# Patient Record
Sex: MALE | Race: WHITE | NOT HISPANIC OR LATINO | Employment: FULL TIME | URBAN - METROPOLITAN AREA
[De-identification: names, ages, dates, MRNs, and addresses within clinical notes are randomized per-mention and may not be internally consistent; named-entity substitution may affect disease eponyms.]

---

## 2018-07-19 ENCOUNTER — TRANSCRIBE ORDERS (OUTPATIENT)
Dept: PHYSICAL THERAPY | Facility: HOSPITAL | Age: 68
End: 2018-07-19

## 2018-07-19 DIAGNOSIS — M54.12 CERVICAL RADICULOPATHY: Primary | ICD-10-CM

## 2018-07-27 ENCOUNTER — HOSPITAL ENCOUNTER (OUTPATIENT)
Dept: PHYSICAL THERAPY | Facility: HOSPITAL | Age: 68
Discharge: HOME | End: 2018-07-27
Admitting: INTERNAL MEDICINE

## 2018-07-27 DIAGNOSIS — M54.2 CERVICALGIA: Primary | ICD-10-CM

## 2018-07-27 DIAGNOSIS — M54.12 CERVICAL RADICULOPATHY: ICD-10-CM

## 2018-07-27 PROCEDURE — 97110 THERAPEUTIC EXERCISES: CPT | Performed by: PHYSICAL THERAPIST

## 2018-07-27 PROCEDURE — G8985 CARRY GOAL STATUS: HCPCS | Performed by: PHYSICAL THERAPIST

## 2018-07-27 PROCEDURE — 97161 PT EVAL LOW COMPLEX 20 MIN: CPT | Performed by: PHYSICAL THERAPIST

## 2018-07-27 PROCEDURE — G8984 CARRY CURRENT STATUS: HCPCS | Performed by: PHYSICAL THERAPIST

## 2018-07-27 NOTE — THERAPY EVALUATION
Outpatient Physical Therapy Ortho Initial Evaluation  [facility]     Patient Name: Uriah Perez  : 1950  MRN: [id number]  Today's Date: 2018      Visit Date: 2018    There is no problem list on file for this patient.       No past medical history on file.     No past surgical history on file.    Visit Dx:     ICD-10-CM ICD-9-CM   1. Cervicalgia M54.2 723.1   2. Cervical radiculopathy M54.12 723.4             Patient History     Row Name 18 1100             History    Chief Complaint Difficulty with daily activities;Pain;Tinglings  -      Date Current Problem(s) Began 18  -      Brief Description of Current Complaint Pt has had some neck and R upper arm pain off and on for years. He came back from [place] and was moving boxes at the end of . He was having more constant arm pain. Went to [facility] and got some meds, that didn't help. Saw him again a couple of weeks ago. Given a steroid dose pack. It did help to relieve the pain.   -      Previous treatment for THIS PROBLEM Medication  -      Patient/Caregiver Goals Relieve pain;Return to prior level of function;Improve mobility;Improve strength  -      Patient's Rating of General Health Very good  -      Hand Dominance right-handed  -      Occupation/sports/leisure activities   -      How has patient tried to help current problem? heat, pred pack  -         Pain     Pain Location Arm;Neck  -LH      Pain at Present 4;5  -LH      Pain at Best 3;4  -LH      Pain at Worst 7  -      Pain Frequency Constant/continuous  -      What Performance Factors Make the Current Problem(s) WORSE? holding arm up to shave, sleeping, sometimes driving,   -      What Performance Factors Make the Current Problem(s) BETTER? Tylenol or advil, heating pain  -LH      Is your sleep disturbed? Yes  -      Difficulties at work? raising arms repetitvely  -         Fall Risk Assessment    Any falls in the past year: No  -LH          Services    Prior Rehab/Home Health Experiences Yes  -      When was the prior experience with Rehab/Home Health year ago  -         Daily Activities    Primary Language English  -      How does patient learn best? Reading  -      Teaching needs identified Home Exercise Program;Management of Condition  -      Patient is concerned about/has problems with Difficulty with self care (i.e. bathing, dressing, toileting:;Performing home management (household chores, shopping, care of dependents);Performing job responsibilities/community activities (work, school,;Reaching over head;Repetitive movements of the hand, arm, shoulder  -      Does patient have problems with the following? None  -      Barriers to learning None  -      Pt Participated in POC and Goals Yes  -         Safety    Are you being hurt, hit, or frightened by anyone at home or in your life? No  -LH      Are you being neglected by a caregiver No  -        User Key  (r) = Recorded By, (t) = Taken By, (c) = Cosigned By    Initials Name Provider Type     Felecia Lopez, PT Physical Therapist                PT Ortho     Row Name 07/27/18 1200       Posture/Observations    Forward Head Moderate  -LH    Thoracic Kyphosis Moderate;Increased  -LH    Rounded Shoulders Moderate  -LH       Myotomal Screen- Upper Quarter Clearing    Shoulder flexion (C5) 4+ (Good +)  -LH    Elbow flexion/wrist extension (C6) 4+ (Good +)  -LH    Elbow extension/wrist flexion (C7) 4+ (Good +)  -LH    Finger flexion/ (C8) 4+ (Good +)  -LH    Finger abduction (T1) 4+ (Good +)  -       Cervical Palpation    Suboccipital Right:;Tender;Guarded/taut  -    Levator Scapula Right:;Tender;Guarded/taut  -    Upper Traps Right:;Tender;Guarded/taut  -LH       Cervical Accessory Motions    Cervical Accessory Motions Tested? --   decreased mobility with R rot and lat flexion  -       Cervical/Thoracic Special Tests    Spurlings (Foraminal Compression) Right:;Positive   -    Cervical Compression (Forarminal Compression vs. Facet Pain) Right:;Positive  -    Cervical Distraction (Foraminal Compression vs. Facet Pain) Right:;Positive  -       Head/Neck/Trunk    Head/Neck/Trunk Comments limited about 25-50% with pain on R with R rotation and lateral flexion, mild with ext (but increased with overpressure)  -      User Key  (r) = Recorded By, (t) = Taken By, (c) = Cosigned By    Initials Name Provider Type     Felecia Lopez, PT Physical Therapist                      Therapy Education  Education Details: given info on dry needling, discussed cervical traction  Given: HEP, Symptoms/condition management, Pain management, Posture/body mechanics  Program: New  How Provided: Verbal, Written, Demonstration  Provided to: Patient  Level of Understanding: Teach back education performed, Verbalized, Demonstrated           PT OP Goals     Row Name 07/27/18 1200          PT Short Term Goals    STG 1 Patient will be independent with education for posture, symptom management, joint protection and strategies to minimize stress on affected tissues  -     STG 1 [facility]     STG 2 Pt to improve pain complaints to no more than 5/10 with ADLs  -     STG 2 [facility]        Long Term Goals    LTG 1 Pt to improve pain complaints to no more than 2-3/10 with ADLs  -     LTG 1 [facility]     LTG 2 Pt to reduce radicular pain to occasional reports  -     LTG 2 [facility]     LTG 3 Pt able to sleep for 5 hours without waking from pain  -     LTG 3 [facility]     LTG 4 Pt to improve NDI score from 28% to 12% for overall functional improvement  -     LTG 4 [facility]     LTG 5 Pt to be independent with HEP for symptom management and strengthening  -     LTG 5 [facility]  -LH        Time Calculation    PT Goal Re-Cert Due Date 10/27/18  -       User Key  (r) = Recorded By, (t) = Taken By, (c) = Cosigned By    Initials Name Provider Type      Felecia Lopez, PT Physical Therapist                PT Assessment/Plan     Row Name 07/27/18 2513          PT Assessment    Functional Limitations Limitations in community activities;Limitation in home management;Performance in leisure activities;Limitations in functional capacity and performance;Performance in self-care ADL  -     Impairments Muscle strength;Pain;Joint mobility;Poor body mechanics;Posture;Range of motion  -     Assessment Comments Pt is a 67 y.o male with complaints of neck pain with radiating pain into the R UE that began in June after moving boxes into his new residence. His pain is stable and has improved since he took a steroid dose pain. He has radiating pain into C6/7 dermatome of the RUE that is pretty constant. He has decreased cervical ROM and pain with R rotation and lateral flexion. He is (+) for R arm pain with cervical compression and Spurlings test. He scored a 28% on the NDI, with 0% being no disability. Pt would benefit from therapy to improve his posture and help improve function with ADLs such as shaving, sleeping, and lifting.   -     Please refer to paper survey for additional self-reported information Yes  -     Rehab Potential Good  -     Patient/caregiver participated in establishment of treatment plan and goals Yes  -     Patient would benefit from skilled therapy intervention Yes  -        PT Plan    PT Frequency 2x/week  -     Predicted Duration of Therapy Intervention (Therapy Eval) 4-6 wks  -     Planned CPT's? PT EVAL LOW COMPLEXITY: 92518;PT THER PROC EA 15 MIN: 13198;PT TRACTION CERVICAL: 59207;PT MANUAL THERAPY EA 15 MIN: 84876;PT ELECTRICAL STIM ATTD EA 15 MIN: 53591;PT HOT OR COLD PACK TREAT MCARE  -     Physical Therapy Interventions (Optional Details) joint mobilization;patient/family education;ROM (Range of Motion);modalities;manual therapy techniques;strengthening;stretching;dry needling;home exercise program;postural re-education  -      PT Plan Comments plan on see 2x week for improving posture and reduction of pain  -       User Key  (r) = Recorded By, (t) = Taken By, (c) = Cosigned By    Initials Name Provider Type     Felecia Lpoez PT Physical Therapist                  Exercises     Row Name 07/27/18 1200             Total Minutes    03732 - PT Therapeutic Exercise Minutes 15  -LH         Exercise 1    Exercise Name 1 supine cervical nodding  -LH      Sets 1 1  -LH      Reps 1 10  -LH      Time 1 5 sec  -LH         Exercise 2    Exercise Name 2 shoulder rolls backward  -LH      Sets 2 1  -LH      Reps 2 10  -LH         Exercise 3    Exercise Name 3 scap ret and shoulder ext  -LH      Sets 3 1  -LH      Reps 3 20  -LH      Additional Comments red  -LH         Exercise 4    Exercise Name 4 pect stretch in doorway  -LH      Sets 4 1  -LH      Reps 4 3  -LH      Time 4 20 sec  -LH        User Key  (r) = Recorded By, (t) = Taken By, (c) = Cosigned By    Initials Name Provider Type     Felecia Lopez PT Physical Therapist                        Outcome Measure Options: Neck Disability Index (NDI) (28%)         Time Calculation:     Therapy Suggested Charges     Code   Minutes Charges    60683 (CPT®)  Pt Neuromusc Re Education Ea 15 Min      66876 (CPT®)  Pt Ther Proc Ea 15 Min 15 1    09048 (CPT®) Hc Gait Training Ea 15 Min      40230 (CPT®) Hc Pt Therapeutic Act Ea 15 Min      67288 (CPT®) Hc Pt Manual Therapy Ea 15 Min      72400 (CPT®)  Pt Ther Massage- Per 15 Min      98033 (CPT®)  Pt Iontophoresis Ea 15 Min      38550 (CPT®)  Pt Elec Stim Ea-Per 15 Min      44397 (CPT®)  Pt Ultrasound Ea 15 Min      30625 (CPT®)  Pt Self Care/Mgmt/Train Ea 15 Min      Total  15 1          Start Time: 1100  Stop Time: 1145  Time Calculation (min): 45 min  Total Timed Code Minutes- PT: 43 minute(s)     Therapy Charges for Today     Code Description Service Date Service Provider Modifiers Qty    06753548451 HC PT CARRY MOV HAND OBJ  CURRENT 7/27/2018 Felecia Lopez, PT GP, CJ 1    94274823717 HC PT CARRY MOV HAND OBJ PROJECTED 7/27/2018 Felecia Lopez, PT GP, CI 1    78205835434 HC PT THER PROC EA 15 MIN 7/27/2018 Felecia Lopez, PT GP 1    28360032825 HC PT EVAL LOW COMPLEXITY 2 7/27/2018 Felecia Lopez, PT GP 1          PT G-Codes  Outcome Measure Options: Neck Disability Index (NDI) (28%)  Functional Limitation: Carrying, moving and handling objects  Carrying, Moving and Handling Objects Current Status (): At least 20 percent but less than 40 percent impaired, limited or restricted  Carrying, Moving and Handling Objects Goal Status (): At least 1 percent but less than 20 percent impaired, limited or restricted         Felecia Lopez, PT  7/27/2018

## 2018-08-02 ENCOUNTER — HOSPITAL ENCOUNTER (OUTPATIENT)
Dept: PHYSICAL THERAPY | Facility: HOSPITAL | Age: 68
Setting detail: THERAPIES SERIES
Discharge: HOME | End: 2018-08-02

## 2018-08-02 DIAGNOSIS — M54.12 CERVICAL RADICULOPATHY: ICD-10-CM

## 2018-08-02 DIAGNOSIS — M54.2 CERVICALGIA: Primary | ICD-10-CM

## 2018-08-02 PROCEDURE — 97110 THERAPEUTIC EXERCISES: CPT | Performed by: PHYSICAL THERAPIST

## 2018-08-02 PROCEDURE — 97140 MANUAL THERAPY 1/> REGIONS: CPT | Performed by: PHYSICAL THERAPIST

## 2018-08-02 NOTE — THERAPY TREATMENT NOTE
Outpatient Physical Therapy Ortho Treatment Note  [facility]     Patient Name: Uriah Perez  : 1950  MRN: [id number]  Today's Date: 2018      Visit Date: 2018    Visit Dx:    ICD-10-CM ICD-9-CM   1. Cervicalgia M54.2 723.1   2. Cervical radiculopathy M54.12 723.4       There is no problem list on file for this patient.       No past medical history on file.     No past surgical history on file.                          PT Assessment/Plan     Row Name 18 1017          PT Assessment    Assessment Comments Pt is still having tingling into the arm,but reports that his overall pain is better. he is working on improving his posture with work. He has trigger points in the R UT and levator muscles and consented to IMT after discussion of benefits and risks. Pt tolerated it well and had decreased pain post tx  -LH        PT Plan    PT Plan Comments assess pain/.symptoms following IMT  -       User Key  (r) = Recorded By, (t) = Taken By, (c) = Cosigned By    Initials Name Provider Type     Felecia Lopez, PT Physical Therapist                    Exercises     Row Name 18 0900             Subjective Comments    Subjective Comments Have some moments where the tingling  and pain is lower.   -         Subjective Pain    Able to rate subjective pain? yes  -      Pre-Treatment Pain Level 5  -LH         Total Minutes    18732 - PT Therapeutic Exercise Minutes 15  -LH      25105 - PT Manual Therapy Minutes 25  -LH         Exercise 1    Exercise Name 1 supine cervical nodding  -      Sets 1 1  -      Reps 1 10  -LH      Time 1 5 sec  -LH         Exercise 2    Exercise Name 2 shoulder rolls backward  -      Sets 2 1  -      Reps 2 10  -LH         Exercise 3    Exercise Name 3 scap ret and shoulder ext  -      Sets 3 1  -      Reps 3 20  -LH      Additional Comments red  -         Exercise 4    Exercise Name 4 pect stretch in doorway  -      Sets 4 1  -      Reps 4 3   -LH      Time 4 20 sec  -LH         Exercise 5    Exercise Name 5 supine horiz abd and D2 flex  -LH      Sets 5 1  -LH      Reps 5 20  -LH      Additional Comments red  -LH        User Key  (r) = Recorded By, (t) = Taken By, (c) = Cosigned By    Initials Name Provider Type    Felecia Garza PT Physical Therapist                        Manual Rx (last 36 hours)      Manual Treatments     Row Name 08/02/18 0900             Total Minutes    70275 - PT Manual Therapy Minutes 25  -LH         Manual Rx 1    Manual Rx 1 Location B UT, R levator  -LH      Manual Rx 1 Type intramuscular manual therapy using threading techniques. LTRs were achieved. Palpation was done before, during, and after tx. Obtained written and verbal consent to treat  -      Manual Rx 1 Duration 8  -LH         Manual Rx 2    Manual Rx 2 Location B UT, levator and cervical PVMs  -      Manual Rx 2 Type STM to above with manual traction and stretching to pects and R UT/levator  -LH      Manual Rx 2 Duration 17  -LH        User Key  (r) = Recorded By, (t) = Taken By, (c) = Cosigned By    Initials Name Provider Type     Felecia Lopez PT Physical Therapist              Therapy Education  Given: HEP, Symptoms/condition management, Pain management, Posture/body mechanics  Program: New  How Provided: Verbal, Written  Provided to: Patient  Level of Understanding: Teach back education performed              Time Calculation:   Start Time: 0930  Stop Time: 1013  Time Calculation (min): 43 min  Total Timed Code Minutes- PT: 40 minute(s)  Therapy Suggested Charges     Code   Minutes Charges    88325 (CPT®) Hc Pt Neuromusc Re Education Ea 15 Min      54320 (CPT®) Hc Pt Ther Proc Ea 15 Min 15 1    65071 (CPT®) Hc Gait Training Ea 15 Min      94240 (CPT®) Hc Pt Therapeutic Act Ea 15 Min      46859 (CPT®) Hc Pt Manual Therapy Ea 15 Min 25 2    63069 (CPT®) Hc Pt Ther Massage- Per 15 Min      34670 (CPT®) Hc Pt Iontophoresis Ea 15 Min      96812  (CPT®) Hc Pt Elec Stim Ea-Per 15 Min      08182 (CPT®) Hc Pt Ultrasound Ea 15 Min      17639 (CPT®) Hc Pt Self Care/Mgmt/Train Ea 15 Min      Total  40 3        Therapy Charges for Today     Code Description Service Date Service Provider Modifiers Qty    31341317078 HC PT THER PROC EA 15 MIN 8/2/2018 Felecia Lopez, PT GP 1    45270417229 HC PT MANUAL THERAPY EA 15 MIN 8/2/2018 Felecia Lopez, PT GP 2                    Felecia Lopez, PT  8/2/2018

## 2018-08-06 ENCOUNTER — HOSPITAL ENCOUNTER (OUTPATIENT)
Dept: PHYSICAL THERAPY | Facility: HOSPITAL | Age: 68
Setting detail: THERAPIES SERIES
Discharge: HOME | End: 2018-08-06

## 2018-08-06 DIAGNOSIS — M54.2 CERVICALGIA: Primary | ICD-10-CM

## 2018-08-06 DIAGNOSIS — M54.12 CERVICAL RADICULOPATHY: ICD-10-CM

## 2018-08-06 PROCEDURE — 97140 MANUAL THERAPY 1/> REGIONS: CPT | Performed by: PHYSICAL THERAPIST

## 2018-08-06 PROCEDURE — 97110 THERAPEUTIC EXERCISES: CPT | Performed by: PHYSICAL THERAPIST

## 2018-08-06 NOTE — THERAPY TREATMENT NOTE
Outpatient Physical Therapy Ortho Treatment Note  [facility]     Patient Name: Uriah Perez  : 1950  MRN: [id number]  Today's Date: 2018      Visit Date: 2018    Visit Dx:    ICD-10-CM ICD-9-CM   1. Cervicalgia M54.2 723.1   2. Cervical radiculopathy M54.12 723.4       There is no problem list on file for this patient.       No past medical history on file.     No past surgical history on file.                          PT Assessment/Plan     Row Name 18 1338          PT Assessment    Assessment Comments Pt pain levels are more in the am. He is still having tingling in the arm down to the hand that is constant. computer work or reading tend to increase the pain  -        PT Plan    PT Plan Comments cont  -       User Key  (r) = Recorded By, (t) = Taken By, (c) = Cosigned By    Initials Name Provider Type     Felecia Lopez, PT Physical Therapist                    Exercises     Row Name 18 1300 18 1200          Subjective Comments    Subjective Comments  -- The tingling is constant. Pain is worse in the am  -        Subjective Pain    Able to rate subjective pain?  -- yes  -     Pre-Treatment Pain Level  -- 5  -LH        Total Minutes    97639 - PT Therapeutic Exercise Minutes  -- 15  -LH     41400 - PT Manual Therapy Minutes 25  -LH  --        Exercise 1    Exercise Name 1  -- supine cervical nodding  -     Sets 1  -- 1  -LH     Reps 1  -- 10  -LH     Time 1  -- 5 sec  -LH        Exercise 2    Exercise Name 2  -- shoulder rolls backward  -     Sets 2  -- 1  -LH     Reps 2  -- 10  -LH        Exercise 3    Exercise Name 3  -- scap ret and shoulder ext  -     Sets 3  -- 1  -LH     Reps 3  -- 20  -LH     Additional Comments  -- green  -        Exercise 4    Exercise Name 4  -- pect stretch in doorway  -     Sets 4  -- 1  -LH     Reps 4  -- 3  -LH     Time 4  -- 20 sec  -LH        Exercise 5    Exercise Name 5  -- supine horiz abd and D2 flex  -LH      Sets 5  -- 1  -     Reps 5  -- 20  -     Additional Comments  -- green  -       User Key  (r) = Recorded By, (t) = Taken By, (c) = Cosigned By    Initials Name Provider Type     Felecia Lopez PT Physical Therapist                        Manual Rx (last 36 hours)      Manual Treatments     Row Name 08/06/18 1300             Total Minutes    85246 - PT Manual Therapy Minutes 25  -         Manual Rx 2    Manual Rx 2 Location B UT, levator and cervical PVMs  -      Manual Rx 2 Type STM to above with manual traction and stretching to pects and R UT/levator  -      Manual Rx 2 Duration 25  -        User Key  (r) = Recorded By, (t) = Taken By, (c) = Cosigned By    Initials Name Provider Type     Felecia Lopez PT Physical Therapist              Therapy Education  Given: HEP, Symptoms/condition management, Pain management, Posture/body mechanics  Program: Progressed  How Provided: Verbal  Provided to: Patient  Level of Understanding: Teach back education performed              Time Calculation:   Start Time: 1245  Stop Time: 1330  Time Calculation (min): 45 min  Total Timed Code Minutes- PT: 40 minute(s)  Therapy Suggested Charges     Code   Minutes Charges    00876 (CPT®) Hc Pt Neuromusc Re Education Ea 15 Min      06544 (CPT®) Hc Pt Ther Proc Ea 15 Min 15 1    17326 (CPT®) Hc Gait Training Ea 15 Min      18501 (CPT®) Hc Pt Therapeutic Act Ea 15 Min      78725 (CPT®) Hc Pt Manual Therapy Ea 15 Min 25 2    00887 (CPT®) Hc Pt Ther Massage- Per 15 Min      21605 (CPT®) Hc Pt Iontophoresis Ea 15 Min      70055 (CPT®) Hc Pt Elec Stim Ea-Per 15 Min      26122 (CPT®) Hc Pt Ultrasound Ea 15 Min      58460 (CPT®) Hc Pt Self Care/Mgmt/Train Ea 15 Min      Total  40 3        Therapy Charges for Today     Code Description Service Date Service Provider Modifiers Qty    52445669427 HC PT THER PROC EA 15 MIN 8/6/2018 Felecia Lopez, PT GP 1    14924443020 HC PT MANUAL THERAPY EA 15 MIN 8/6/2018 John  Felecia EUBANKS, PT GP 2                    Felecia Lopez, PT  8/6/2018

## 2018-08-10 ENCOUNTER — HOSPITAL ENCOUNTER (OUTPATIENT)
Dept: PHYSICAL THERAPY | Facility: HOSPITAL | Age: 68
Setting detail: THERAPIES SERIES
Discharge: HOME | End: 2018-08-10

## 2018-08-10 DIAGNOSIS — M54.12 CERVICAL RADICULOPATHY: ICD-10-CM

## 2018-08-10 DIAGNOSIS — M54.2 CERVICALGIA: Primary | ICD-10-CM

## 2018-08-10 PROCEDURE — 97140 MANUAL THERAPY 1/> REGIONS: CPT | Performed by: PHYSICAL THERAPIST

## 2018-08-10 PROCEDURE — 97110 THERAPEUTIC EXERCISES: CPT | Performed by: PHYSICAL THERAPIST

## 2018-08-10 NOTE — THERAPY TREATMENT NOTE
Outpatient Physical Therapy Ortho Treatment Note  [facility]     Patient Name: Uriah Perez  : 1950  MRN: [id number]  Today's Date: 8/10/2018      Visit Date: 08/10/2018    Visit Dx:    ICD-10-CM ICD-9-CM   1. Cervicalgia M54.2 723.1   2. Cervical radiculopathy M54.12 723.4       There is no problem list on file for this patient.       No past medical history on file.     No past surgical history on file.                          PT Assessment/Plan     Row Name 08/10/18 1021          PT Assessment    Assessment Comments pt pain levels have been a little improved this week. He still has tingling down to the R hand that is pretty constant, but reduced with manual distraction  -        PT Plan    PT Plan Comments cont to work on posture and pain reduction  -       User Key  (r) = Recorded By, (t) = Taken By, (c) = Cosigned By    Initials Name Provider Type     Felecia Lpoez, PT Physical Therapist                    Exercises     Row Name 08/10/18 0900             Subjective Comments    Subjective Comments Went to [place] 2 days ago, didn't drive, but felt great for most of the day.  -         Subjective Pain    Able to rate subjective pain? yes  -      Pre-Treatment Pain Level 3  -      Post-Treatment Pain Level 2  -         Total Minutes    53240 - PT Therapeutic Exercise Minutes 15  -LH      09161 - PT Manual Therapy Minutes 25  -LH         Exercise 1    Exercise Name 1 supine cervical nodding  -      Sets 1 1  -      Reps 1 10  -LH      Time 1 5 sec  -LH         Exercise 2    Exercise Name 2 shoulder rolls backward  -      Sets 2 1  -      Reps 2 10  -LH         Exercise 3    Exercise Name 3 scap ret and shoulder ext  -      Sets 3 1  -      Reps 3 20  -LH      Additional Comments green  -         Exercise 4    Exercise Name 4 pect stretch in doorway  -      Sets 4 1  -      Reps 4 3  -LH      Time 4 20 sec  -LH         Exercise 5    Exercise Name 5 supine horiz  abd and D2 flex  -      Sets 5 1  -LH      Reps 5 20  -LH      Additional Comments green  -        User Key  (r) = Recorded By, (t) = Taken By, (c) = Cosigned By    Initials Name Provider Type     Felecia Lopez, PT Physical Therapist                        Manual Rx (last 36 hours)      Manual Treatments     Row Name 08/10/18 0900             Total Minutes    21266 - PT Manual Therapy Minutes 25  -LH         Manual Rx 1    Manual Rx 1 Location B UT, R levator  -      Manual Rx 1 Type intramuscular manual therapy using threading techniques. LTRs were achieved. Palpation was done before, during, and after tx. Obtained verbal consent to treat  -      Manual Rx 1 Duration 8  -LH         Manual Rx 2    Manual Rx 2 Location B UT, levator and cervical PVMs  -      Manual Rx 2 Type STM to above with manual traction and stretching to pects and R UT/levator  -      Manual Rx 2 Duration 17  -LH        User Key  (r) = Recorded By, (t) = Taken By, (c) = Cosigned By    Initials Name Provider Type     Felecia Lopez PT Physical Therapist                PT OP Goals     Row Name 08/10/18 1000          PT Short Term Goals    STG 1 Patient will be independent with education for posture, symptom management, joint protection and strategies to minimize stress on affected tissues  -     STG 1 [facility]ing  -     STG 2 Pt to improve pain complaints to no more than 5/10 with ADLs  -     STG 2 Progress Partially Met  -        Long Term Goals    LTG 1 Pt to improve pain complaints to no more than 2-3/10 with ADLs  -     LTG 1 [facility] Ongoing  -     LTG 2 Pt to reduce radicular pain to occasional reports  -     LTG 2 [facility] Ongoing  -     LTG 3 Pt able to sleep for 5 hours without waking from pain  -     LTG 3 [facility] Ongoing;Progressing  -     LTG 4 Pt to improve NDI score from 28% to 12% for overall functional improvement  -     LTG 4 [facility] Ongoing  -     LTG 5 Pt to be independent  with HEP for symptom management and strengthening  -     LTG 5 Progress Ongoing  -       User Key  (r) = Recorded By, (t) = Taken By, (c) = Cosigned By    Initials Name Provider Type     Felecia Lopez, PT Physical Therapist          Therapy Education  Education Details: discussed his MRI              Time Calculation:   Start Time: 0930  Stop Time: 1015  Time Calculation (min): 45 min  Total Timed Code Minutes- PT: 40 minute(s)  Therapy Suggested Charges     Code   Minutes Charges    24556 (CPT®) Hc Pt Neuromusc Re Education Ea 15 Min      12208 (CPT®) Hc Pt Ther Proc Ea 15 Min 15 1    49960 (CPT®) Hc Gait Training Ea 15 Min      01495 (CPT®) Hc Pt Therapeutic Act Ea 15 Min      13441 (CPT®) Hc Pt Manual Therapy Ea 15 Min 25 2    29415 (CPT®) Hc Pt Ther Massage- Per 15 Min      01142 (CPT®) Hc Pt Iontophoresis Ea 15 Min      32251 (CPT®) Hc Pt Elec Stim Ea-Per 15 Min      18176 (CPT®) Hc Pt Ultrasound Ea 15 Min      83057 (CPT®) Hc Pt Self Care/Mgmt/Train Ea 15 Min      Total  40 3        Therapy Charges for Today     Code Description Service Date Service Provider Modifiers Qty    22022739188 HC PT THER PROC EA 15 MIN 8/10/2018 Felecia Lopez, PT GP 1    18974909362 HC PT MANUAL THERAPY EA 15 MIN 8/10/2018 Felecia Lopez, PT GP 2                    Felecia Lopez, PT  8/10/2018

## 2018-08-13 ENCOUNTER — HOSPITAL ENCOUNTER (OUTPATIENT)
Dept: PHYSICAL THERAPY | Facility: HOSPITAL | Age: 68
Setting detail: THERAPIES SERIES
Discharge: HOME | End: 2018-08-13

## 2018-08-13 DIAGNOSIS — M54.2 CERVICALGIA: Primary | ICD-10-CM

## 2018-08-13 DIAGNOSIS — M54.12 CERVICAL RADICULOPATHY: ICD-10-CM

## 2018-08-13 PROCEDURE — 97110 THERAPEUTIC EXERCISES: CPT | Performed by: PHYSICAL THERAPIST

## 2018-08-13 PROCEDURE — 97140 MANUAL THERAPY 1/> REGIONS: CPT | Performed by: PHYSICAL THERAPIST

## 2018-08-13 NOTE — THERAPY TREATMENT NOTE
Outpatient Physical Therapy Ortho Treatment Note  [facility]     Patient Name: Uriah Perez  : 1950  MRN: [id number]  Today's Date: 2018      Visit Date: 2018    Visit Dx:    ICD-10-CM ICD-9-CM   1. Cervicalgia M54.2 723.1   2. Cervical radiculopathy M54.12 723.4       There is no problem list on file for this patient.       No past medical history on file.     No past surgical history on file.                          PT Assessment/Plan     Row Name 18 1640          PT Assessment    Assessment Comments Pt with improved pain levels and decreased tingling in the arm after his last therapy session. he reports no more than 1/10 over the past few days. He was sitting more in meetings today and has some increase  -        PT Plan    PT Plan Comments cont  -       User Key  (r) = Recorded By, (t) = Taken By, (c) = Cosigned By    Initials Name Provider Type     Felecia Lopez, PT Physical Therapist                    Exercises     Row Name 18 1500             Subjective Comments    Subjective Comments Pt had little to no pain after the last session for several days.   -         Subjective Pain    Able to rate subjective pain? yes  -      Pre-Treatment Pain Level 3  -         Total Minutes    54040 - PT Therapeutic Exercise Minutes 15  -LH      55564 - PT Manual Therapy Minutes 25  -LH         Exercise 1    Exercise Name 1 supine cervical nodding  -      Sets 1 1  -      Reps 1 10  -LH      Time 1 5 sec  -LH         Exercise 2    Exercise Name 2 shoulder rolls backward  -      Sets 2 1  -      Reps 2 10  -LH         Exercise 3    Exercise Name 3 scap ret and shoulder ext  -      Sets 3 1  -      Reps 3 20  -LH      Additional Comments green  -         Exercise 4    Exercise Name 4 pect stretch in doorway  -      Sets 4 1  -      Reps 4 3  -LH      Time 4 20 sec  -LH         Exercise 5    Exercise Name 5 supine horiz abd and D2 flex  -      Sets 5 1   -      Reps 5 20  -LH      Additional Comments green  -         Exercise 6    Exercise Name 6 supine B ER  -      Sets 6 1  -LH      Reps 6 20  -LH      Additional Comments green  -        User Key  (r) = Recorded By, (t) = Taken By, (c) = Cosigned By    Initials Name Provider Type     Felecia Lopez PT Physical Therapist                        Manual Rx (last 36 hours)      Manual Treatments     Row Name 08/13/18 1500             Total Minutes    50265 - PT Manual Therapy Minutes 25  -LH         Manual Rx 2    Manual Rx 2 Location B UT, levator and cervical PVMs  -      Manual Rx 2 Type STM to above with manual traction and stretching to pects and R UT/levator, PA mobs to upper thoracic spine  -      Manual Rx 2 Grade 2-3  -      Manual Rx 2 Duration 25  -LH        User Key  (r) = Recorded By, (t) = Taken By, (c) = Cosigned By    Initials Name Provider Type     Felecia Lopez PT Physical Therapist              Therapy Education  Given: HEP, Symptoms/condition management, Pain management, Posture/body mechanics  Program: New  How Provided: Verbal  Provided to: Patient  Level of Understanding: Teach back education performed              Time Calculation:   Start Time: 1545  Stop Time: 1628  Time Calculation (min): 43 min  Total Timed Code Minutes- PT: 40 minute(s)  Therapy Suggested Charges     Code   Minutes Charges    32024 (CPT®) Hc Pt Neuromusc Re Education Ea 15 Min      67327 (CPT®) Hc Pt Ther Proc Ea 15 Min 15 1    92374 (CPT®) Hc Gait Training Ea 15 Min      52051 (CPT®) Hc Pt Therapeutic Act Ea 15 Min      53823 (CPT®) Hc Pt Manual Therapy Ea 15 Min 25 2    26582 (CPT®) Hc Pt Ther Massage- Per 15 Min      67596 (CPT®) Hc Pt Iontophoresis Ea 15 Min      44905 (CPT®) Hc Pt Elec Stim Ea-Per 15 Min      24470 (CPT®) Hc Pt Ultrasound Ea 15 Min      06851 (CPT®) Hc Pt Self Care/Mgmt/Train Ea 15 Min      Total  40 3        Therapy Charges for Today     Code Description Service Date Service  Provider Modifiers Qty    92673791503  PT THER PROC EA 15 MIN 8/13/2018 Felecia Lopez, PT GP 1    [id number]  PT MANUAL THERAPY EA 15 MIN 8/13/2018 Felecia oLpez, PT GP 2                    Felecia Lopez, PT  8/13/2018

## 2018-08-17 ENCOUNTER — HOSPITAL ENCOUNTER (OUTPATIENT)
Dept: PHYSICAL THERAPY | Facility: HOSPITAL | Age: 68
Setting detail: THERAPIES SERIES
Discharge: HOME | End: 2018-08-17

## 2018-08-17 DIAGNOSIS — M54.12 CERVICAL RADICULOPATHY: ICD-10-CM

## 2018-08-17 DIAGNOSIS — M54.2 CERVICALGIA: Primary | ICD-10-CM

## 2018-08-17 PROCEDURE — 97110 THERAPEUTIC EXERCISES: CPT | Performed by: PHYSICAL THERAPIST

## 2018-08-17 PROCEDURE — 97140 MANUAL THERAPY 1/> REGIONS: CPT | Performed by: PHYSICAL THERAPIST

## 2018-08-17 NOTE — THERAPY TREATMENT NOTE
Outpatient Physical Therapy Ortho Treatment Note  [facility]     Patient Name: Uriah Perez  : 1950  MRN: [id number]  Today's Date: 2018      Visit Date: 2018    Visit Dx:    ICD-10-CM ICD-9-CM   1. Cervicalgia M54.2 723.1   2. Cervical radiculopathy M54.12 723.4       There is no problem list on file for this patient.       No past medical history on file.     No past surgical history on file.                          PT Assessment/Plan     Row Name 18 1238          PT Assessment    Assessment Comments Pt is getting a few days of relief from the manual therapy and IMT. We discussed having him get an OTD traction unit for home, and how to use it  -        PT Plan    PT Plan Comments cont  -       User Key  (r) = Recorded By, (t) = Taken By, (c) = Cosigned By    Initials Name Provider Type     Felecia Lopez, PT Physical Therapist                    Exercises     Row Name 18 1100 18 0900          Subjective Comments    Subjective Comments  -- Feeling some pain in the middle of the upper back today, tingling constant down into the hand  -        Subjective Pain    Able to rate subjective pain?  -- yes  -     Pre-Treatment Pain Level  -- 3  -LH     Post-Treatment Pain Level  -- 2  -        Total Minutes    63884 - PT Therapeutic Exercise Minutes  -- 15  -LH     54914 - PT Manual Therapy Minutes 25  -LH  --        Exercise 1    Exercise Name 1  -- supine cervical nodding  -     Sets 1  -- 1  -     Reps 1  -- 10  -LH     Time 1  -- 5 sec  -LH        Exercise 2    Exercise Name 2  -- shoulder rolls backward  -     Sets 2  -- 1  -LH     Reps 2  -- 10  -LH        Exercise 3    Exercise Name 3  -- scap ret and shoulder ext  -     Sets 3  -- 1  -LH     Reps 3  -- 20  -LH     Additional Comments  -- blue  -        Exercise 4    Exercise Name 4  -- pect stretch in doorway  -     Sets 4  -- 1  -LH     Reps 4  -- 3  -LH     Time 4  -- 20 sec  -LH         Wife Peggy Shaferjaquelinevesta on hippa notified and verbalized understanding. Labs mailed as requested. Exercise 5    Exercise Name 5  -- supine horiz abd and D2 flex  -LH     Sets 5  -- 1  -LH     Reps 5  -- 20  -LH     Additional Comments  -- green  -LH        Exercise 6    Exercise Name 6  -- supine B ER  -LH     Sets 6  -- 1  -LH     Reps 6  -- 20  -LH     Additional Comments  -- green  -LH       User Key  (r) = Recorded By, (t) = Taken By, (c) = Cosigned By    Initials Name Provider Type     Felecia Lopez, PT Physical Therapist                        Manual Rx (last 36 hours)      Manual Treatments     Row Name 08/17/18 1100             Total Minutes    68613 - PT Manual Therapy Minutes 25  -LH         Manual Rx 1    Manual Rx 1 Location B UT, R levator, R rhomboid  -      Manual Rx 1 Type intramuscular manual therapy using threading techniques. LTRs were achieved. Palpation was done before, during, and after tx. Obtained verbal consent to treat  -      Manual Rx 1 Duration 8  -LH         Manual Rx 2    Manual Rx 2 Location B UT, levator and cervical PVMs  -      Manual Rx 2 Type STM to above with manual traction and stretching to pects and R UT/levator  -      Manual Rx 2 Duration 17  -LH        User Key  (r) = Recorded By, (t) = Taken By, (c) = Cosigned By    Initials Name Provider Type     Felecia Lopez, PT Physical Therapist                             Time Calculation:   Start Time: 0930  Stop Time: 1015  Time Calculation (min): 45 min  Total Timed Code Minutes- PT: 40 minute(s)  Therapy Suggested Charges     Code   Minutes Charges    88826 (CPT®) Hc Pt Neuromusc Re Education Ea 15 Min      00538 (CPT®) Hc Pt Ther Proc Ea 15 Min 15 1    52867 (CPT®) Hc Gait Training Ea 15 Min      08151 (CPT®) Hc Pt Therapeutic Act Ea 15 Min      37660 (CPT®) Hc Pt Manual Therapy Ea 15 Min 25 2    58819 (CPT®) Hc Pt Ther Massage- Per 15 Min      31973 (CPT®) Hc Pt Iontophoresis Ea 15 Min      26997 (CPT®) Hc Pt Elec Stim Ea-Per 15 Min      75669 (CPT®) Hc Pt Ultrasound Ea 15 Min      40486 (CPT®) Hc Pt  Self Care/Mgmt/Train Ea 15 Min      44620 (CPT®) Hc Pt Prosthetic (S) Train Initial Encounter, Each 15 Min      98662 (CPT®) Hc Orthotic(S) Mgmt/Train Initial Encounter, Each 15min      99669 (CPT®) Hc Pt Aquatic Therapy Ea 15 Min      65831 (CPT®) Hc Pt Orthotic(S)/Prosthetic(S) Encounter, Each 15 Min      Total  40 3        Therapy Charges for Today     Code Description Service Date Service Provider Modifiers Qty    42153516897 HC PT THER PROC EA 15 MIN 8/17/2018 Felecia Lopez, PT GP 1    64173544708 HC PT MANUAL THERAPY EA 15 MIN 8/17/2018 Felecia Lopez, PT GP 2                    Felecia Lopez, PT  8/17/2018

## 2018-08-20 ENCOUNTER — HOSPITAL ENCOUNTER (OUTPATIENT)
Dept: PHYSICAL THERAPY | Facility: HOSPITAL | Age: 68
Setting detail: THERAPIES SERIES
Discharge: HOME | End: 2018-08-20

## 2018-08-20 DIAGNOSIS — M54.12 CERVICAL RADICULOPATHY: ICD-10-CM

## 2018-08-20 DIAGNOSIS — M54.2 CERVICALGIA: Primary | ICD-10-CM

## 2018-08-20 PROCEDURE — 97110 THERAPEUTIC EXERCISES: CPT | Performed by: PHYSICAL THERAPIST

## 2018-08-20 PROCEDURE — 97140 MANUAL THERAPY 1/> REGIONS: CPT | Performed by: PHYSICAL THERAPIST

## 2018-08-20 NOTE — THERAPY PROGRESS REPORT/RE-CERT
Outpatient Physical Therapy Ortho Progress Note  [facility]     Patient Name: Uriah Perez  : 1950  MRN: [id number]  Today's Date: 2018      Visit Date: 2018    Visit Dx:    ICD-10-CM ICD-9-CM   1. Cervicalgia M54.2 723.1   2. Cervical radiculopathy M54.12 723.4       There is no problem list on file for this patient.       No past medical history on file.     No past surgical history on file.          PT Ortho     Row Name 18 0900       Subjective Comments    Subjective Comments pt got a home OTD traction unit this weekend and it has helped some, especially for a few hours after he uses it. He still has the constant pain into the fingers - electric in sensation  -       Subjective Pain    Able to rate subjective pain? yes  -    Pre-Treatment Pain Level 3  -      User Key  (r) = Recorded By, (t) = Taken By, (c) = Cosigned By    Initials Name Provider Type    Felecia Garza, PT Physical Therapist                            PT Assessment/Plan     Row Name 18 1013          PT Assessment    Assessment Comments Pt's pain levels over the past month are 2-3/10 on average. He is still having constant numbness and tingling into the R UE, but he is now able to sleep without waking from pain. He has been working on a HEP for UE, neck, and core strength to help promote better posture and recently obtained a home OTD traction unit.   -        PT Plan    PT Plan Comments cont  -       User Key  (r) = Recorded By, (t) = Taken By, (c) = Cosigned By    Initials Name Provider Type    Felecia Garza PT Physical Therapist                    Exercises     Row Name 18 0900             Subjective Comments    Subjective Comments pt got a home OTD traction unit this weekend and it has helped some, especially for a few hours after he uses it. He still has the constant pain into the fingers - electric in sensation  -         Subjective Pain    Able to rate subjective pain?  yes  -LH      Pre-Treatment Pain Level 3  -LH      Post-Treatment Pain Level 2  -LH         Total Minutes    75192 - PT Therapeutic Exercise Minutes 15  -LH      90346 - PT Manual Therapy Minutes 25  -LH         Exercise 1    Exercise Name 1 supine cervical nodding  -LH      Sets 1 1  -LH      Reps 1 10  -LH      Time 1 5 sec  -LH         Exercise 2    Exercise Name 2 shoulder rolls backward  -LH      Sets 2 1  -LH      Reps 2 10  -LH         Exercise 3    Exercise Name 3 scap ret and shoulder ext  -LH      Sets 3 1  -LH      Reps 3 20  -LH      Additional Comments blue  -LH         Exercise 4    Exercise Name 4 pect stretch in doorway  -LH      Sets 4 1  -LH      Reps 4 3  -LH      Time 4 20 sec  -LH         Exercise 5    Exercise Name 5 supine horiz abd and D2 flex  -LH      Sets 5 1  -LH      Reps 5 20  -LH         Exercise 6    Exercise Name 6 supine B ER  -LH      Sets 6 1  -LH      Reps 6 20  -LH        User Key  (r) = Recorded By, (t) = Taken By, (c) = Cosigned By    Initials Name Provider Type     Felecia Lopez, PT Physical Therapist                        Manual Rx (last 36 hours)      Manual Treatments     Row Name 08/20/18 0900             Total Minutes    50885 - PT Manual Therapy Minutes 25  -LH         Manual Rx 2    Manual Rx 2 Location B UT, levator and cervical PVMs  -LH      Manual Rx 2 Type STM to above with manual traction and stretching to pects and R UT/levator  -LH      Manual Rx 2 Grade 2-3  -LH      Manual Rx 2 Duration 25  -LH        User Key  (r) = Recorded By, (t) = Taken By, (c) = Cosigned By    Initials Name Provider Type     Felecia Lopez, PT Physical Therapist              Therapy Education  Given: HEP, Symptoms/condition management, Pain management, Posture/body mechanics  Program: Reinforced  How Provided: Verbal  Provided to: Patient  Level of Understanding: Teach back education performed              Time Calculation:   Start Time: 0930  Stop Time: 1010  Time  Calculation (min): 40 min  Total Timed Code Minutes- PT: 40 minute(s)  Therapy Suggested Charges     Code   Minutes Charges    17950 (CPT®) Hc Pt Neuromusc Re Education Ea 15 Min      22448 (CPT®) Hc Pt Ther Proc Ea 15 Min 15 1    78429 (CPT®) Hc Gait Training Ea 15 Min      03426 (CPT®) Hc Pt Therapeutic Act Ea 15 Min      23099 (CPT®) Hc Pt Manual Therapy Ea 15 Min 25 2    13899 (CPT®) Hc Pt Ther Massage- Per 15 Min      71221 (CPT®) Hc Pt Iontophoresis Ea 15 Min      33972 (CPT®) Hc Pt Elec Stim Ea-Per 15 Min      38526 (CPT®) Hc Pt Ultrasound Ea 15 Min      04377 (CPT®) Hc Pt Self Care/Mgmt/Train Ea 15 Min      51060 (CPT®) Hc Pt Prosthetic (S) Train Initial Encounter, Each 15 Min      70128 (CPT®) Hc Orthotic(S) Mgmt/Train Initial Encounter, Each 15min      25183 (CPT®) Hc Pt Aquatic Therapy Ea 15 Min      54321 (CPT®) Hc Pt Orthotic(S)/Prosthetic(S) Encounter, Each 15 Min      Total  40 3        Therapy Charges for Today     Code Description Service Date Service Provider Modifiers Qty    83485659309 HC PT THER PROC EA 15 MIN 8/20/2018 Felecia Lopez, PT GP 1    11515070043 HC PT MANUAL THERAPY EA 15 MIN 8/20/2018 Felecia Lopez, PT GP 2                    Felecia Lopez, PT  8/20/2018

## 2018-08-24 ENCOUNTER — HOSPITAL ENCOUNTER (OUTPATIENT)
Dept: PHYSICAL THERAPY | Facility: HOSPITAL | Age: 68
Setting detail: THERAPIES SERIES
Discharge: HOME | End: 2018-08-24

## 2018-08-24 DIAGNOSIS — M54.12 CERVICAL RADICULOPATHY: ICD-10-CM

## 2018-08-24 DIAGNOSIS — M54.2 CERVICALGIA: Primary | ICD-10-CM

## 2018-08-24 PROCEDURE — 97110 THERAPEUTIC EXERCISES: CPT | Performed by: PHYSICAL THERAPIST

## 2018-08-24 PROCEDURE — 97140 MANUAL THERAPY 1/> REGIONS: CPT | Performed by: PHYSICAL THERAPIST

## 2018-08-24 NOTE — THERAPY TREATMENT NOTE
Outpatient Physical Therapy Ortho Treatment Note  [facility]     Patient Name: Uriah Perez  : 1950  MRN: [id number]  Today's Date: 2018      Visit Date: 2018    Visit Dx:    ICD-10-CM ICD-9-CM   1. Cervicalgia M54.2 723.1   2. Cervical radiculopathy M54.12 723.4       There is no problem list on file for this patient.       No past medical history on file.     No past surgical history on file.                          PT Assessment/Plan     Row Name 18 1119          PT Assessment    Assessment Comments Pt pain levels are doing well and staying low for most of the day. he is sleeping better and was able to do yard work this week without increased pain  -        PT Plan    PT Plan Comments cont but dec to 1x week for progression of pain reduction and tolerance to activity  -       User Key  (r) = Recorded By, (t) = Taken By, (c) = Cosigned By    Initials Name Provider Type     Felecia Lopez, PT Physical Therapist                    Exercises     Row Name 18 0900             Subjective Pain    Able to rate subjective pain? yes  -      Pre-Treatment Pain Level 3  -LH         Total Minutes    76823 - PT Manual Therapy Minutes 25  -LH         Exercise 1    Exercise Name 1 supine cervical nodding  -      Sets 1 1  -LH      Reps 1 10  -LH      Time 1 5 sec  -LH         Exercise 2    Exercise Name 2 shoulder rolls backward  -LH      Sets 2 1  -LH      Reps 2 10  -LH         Exercise 3    Exercise Name 3 scap ret and shoulder ext  -LH      Sets 3 1  -LH      Reps 3 20  -LH      Additional Comments blue  -LH         Exercise 4    Exercise Name 4 pect stretch in doorway  -LH      Sets 4 1  -LH      Reps 4 3  -LH      Time 4 20 sec  -LH         Exercise 5    Exercise Name 5 supine horiz abd and D2 flex  -LH      Sets 5 1  -LH      Reps 5 20  -LH      Additional Comments blue  -LH         Exercise 6    Exercise Name 6 supine B ER  -LH      Sets 6 1  -LH      Reps 6 20  -LH       Additional Comments blue  -         Exercise 7    Exercise Name 7 lat pull down  -      Sets 7 1  -      Reps 7 20  -LH      Additional Comments blue  -         Exercise 8    Exercise Name 8 wall push ups  -LH      Sets 8 1  -LH      Reps 8 20  -LH        User Key  (r) = Recorded By, (t) = Taken By, (c) = Cosigned By    Initials Name Provider Type    Felecia Garza PT Physical Therapist                        Manual Rx (last 36 hours)      Manual Treatments     Row Name 08/24/18 0900             Total Minutes    02081 - PT Manual Therapy Minutes 25  -LH         Manual Rx 1    Manual Rx 1 Location B UT, R levator  -      Manual Rx 1 Type intramuscular manual therapy using threading techniques. LTRs were achieved. Palpation was done before, during, and after tx. Obtained verbal consent to treat  -      Manual Rx 1 Duration 8  -LH         Manual Rx 2    Manual Rx 2 Location B UT, levator and cervical PVMs  -      Manual Rx 2 Type STM to above with manual traction and stretching to pects and R UT/levator  -      Manual Rx 2 Duration 17  -LH        User Key  (r) = Recorded By, (t) = Taken By, (c) = Cosigned By    Initials Name Provider Type     Felecia Lopez, PT Physical Therapist              Therapy Education  Given: HEP, Symptoms/condition management, Pain management, Posture/body mechanics  Program: New  How Provided: Verbal, Written  Provided to: Patient  Level of Understanding: Teach back education performed, Demonstrated              Time Calculation:   Start Time: 0930  Stop Time: 1015  Time Calculation (min): 45 min  Total Timed Code Minutes- PT: 42 minute(s)  Therapy Suggested Charges     Code   Minutes Charges    66760 (CPT®)  Pt Neuromusc Re Education Ea 15 Min      27801 (CPT®) Hc Pt Ther Proc Ea 15 Min      91709 (CPT®) Hc Gait Training Ea 15 Min      45909 (CPT®) Hc Pt Therapeutic Act Ea 15 Min      78213 (CPT®) Hc Pt Manual Therapy Ea 15 Min 25 2    67908 (CPT®) Hc Pt  Ther Massage- Per 15 Min      38392 (CPT®) Hc Pt Iontophoresis Ea 15 Min      32993 (CPT®) Hc Pt Elec Stim Ea-Per 15 Min      61870 (CPT®) Hc Pt Ultrasound Ea 15 Min      40030 (CPT®) Hc Pt Self Care/Mgmt/Train Ea 15 Min      24546 (CPT®) Hc Pt Prosthetic (S) Train Initial Encounter, Each 15 Min      95001 (CPT®) Hc Orthotic(S) Mgmt/Train Initial Encounter, Each 15min      53213 (CPT®) Hc Pt Aquatic Therapy Ea 15 Min      66752 (CPT®) Hc Pt Orthotic(S)/Prosthetic(S) Encounter, Each 15 Min      Total  25 2        Therapy Charges for Today     Code Description Service Date Service Provider Modifiers Qty    17005890687 HC PT MANUAL THERAPY EA 15 MIN 8/24/2018 Felecia Lopez, PT GP 2    44838846429 HC PT THER PROC EA 15 MIN 8/24/2018 Felecia Lopez, PT GP 1                    Felecia Lopez, PT  8/24/2018

## 2018-08-31 ENCOUNTER — HOSPITAL ENCOUNTER (OUTPATIENT)
Dept: PHYSICAL THERAPY | Facility: HOSPITAL | Age: 68
Setting detail: THERAPIES SERIES
Discharge: HOME | End: 2018-08-31

## 2018-08-31 DIAGNOSIS — M54.12 CERVICAL RADICULOPATHY: ICD-10-CM

## 2018-08-31 DIAGNOSIS — M54.2 CERVICALGIA: Primary | ICD-10-CM

## 2018-08-31 PROCEDURE — 97140 MANUAL THERAPY 1/> REGIONS: CPT | Performed by: PHYSICAL THERAPIST

## 2018-08-31 PROCEDURE — 97110 THERAPEUTIC EXERCISES: CPT | Performed by: PHYSICAL THERAPIST

## 2018-09-07 ENCOUNTER — HOSPITAL ENCOUNTER (OUTPATIENT)
Dept: PHYSICAL THERAPY | Facility: HOSPITAL | Age: 68
Setting detail: THERAPIES SERIES
Discharge: HOME | End: 2018-09-07

## 2018-09-07 DIAGNOSIS — M54.12 CERVICAL RADICULOPATHY: ICD-10-CM

## 2018-09-07 DIAGNOSIS — M54.2 CERVICALGIA: Primary | ICD-10-CM

## 2018-09-07 PROCEDURE — 97140 MANUAL THERAPY 1/> REGIONS: CPT | Performed by: PHYSICAL THERAPIST

## 2018-09-07 PROCEDURE — 97110 THERAPEUTIC EXERCISES: CPT | Performed by: PHYSICAL THERAPIST

## 2018-09-07 NOTE — THERAPY TREATMENT NOTE
Outpatient Physical Therapy Ortho Treatment Note  [facility]     Patient Name: Uriah Perez  : 1950  MRN: [id number]  Today's Date: 2018      Visit Date: 2018    Visit Dx:    ICD-10-CM ICD-9-CM   1. Cervicalgia M54.2 723.1   2. Cervical radiculopathy M54.12 723.4       There is no problem list on file for this patient.       No past medical history on file.     No past surgical history on file.                          PT Assessment/Plan     Row Name 18 1104          PT Assessment    Assessment Comments Pt is now having little to no pain. He still has tingling into the R hand, which is pretty constant but not as intense. He has been using an OTD traction unit daily to EOD and it is helping as well. He can sleep without waking from pain.   -        PT Plan    PT Plan Comments cont. Sees neuro next week  -       User Key  (r) = Recorded By, (t) = Taken By, (c) = Cosigned By    Initials Name Provider Type     Felecia Lopez, PT Physical Therapist                    Exercises     Row Name 18 1100 18 1000          Subjective Comments    Subjective Comments  -- tingling still in the hand but no pain  -        Subjective Pain    Able to rate subjective pain?  -- yes  -     Pre-Treatment Pain Level  -- 0  -     Post-Treatment Pain Level  -- 0  -        Total Minutes    92819 - PT Therapeutic Exercise Minutes  -- 18  -LH     28931 - PT Manual Therapy Minutes 25  -LH  --        Exercise 1    Exercise Name 1  -- supine cervical nodding  -     Sets 1  -- 1  -LH     Reps 1  -- 10  -LH     Time 1  -- 5 sec  -        Exercise 2    Exercise Name 2  -- shoulder rolls backward  -     Sets 2  -- 1  -LH     Reps 2  -- 10  -LH        Exercise 3    Exercise Name 3  -- scap ret and shoulder ext  -     Sets 3  -- 1  -LH     Reps 3  -- 20  -LH     Additional Comments  -- blue  -        Exercise 4    Exercise Name 4  -- pect stretch in doorway  -     Sets 4  -- 1  -LH      Reps 4  -- 3  -LH     Time 4  -- 20 sec  -LH        Exercise 5    Exercise Name 5  -- supine horiz abd and D2 flex  -LH     Sets 5  -- 1  -LH     Reps 5  -- 20  -LH     Additional Comments  -- blue  -LH        Exercise 6    Exercise Name 6  -- supine B ER  -LH     Sets 6  -- 1  -LH     Reps 6  -- 20  -LH     Additional Comments  -- blue  -LH        Exercise 7    Exercise Name 7  -- lat pull down  -LH     Sets 7  -- 1  -LH     Reps 7  -- 20  -LH     Additional Comments  -- blue  -LH        Exercise 8    Exercise Name 8  -- wall push ups  -LH     Sets 8  -- 1  -LH     Reps 8  -- 20  -LH       User Key  (r) = Recorded By, (t) = Taken By, (c) = Cosigned By    Initials Name Provider Type    Felecia Garza, PT Physical Therapist                        Manual Rx (last 36 hours)      Manual Treatments     Row Name 09/07/18 1100             Total Minutes    47292 - PT Manual Therapy Minutes 25  -LH         Manual Rx 1    Manual Rx 1 Location B UT, R levator  -      Manual Rx 1 Type intramuscular manual therapy using threading techniques. LTRs were achieved. Palpation was done before, during, and after tx. Obtained verbal consent to treat  -      Manual Rx 1 Duration 8  -LH         Manual Rx 2    Manual Rx 2 Location B UT, levator and cervical PVMs  -      Manual Rx 2 Type STM to above with manual traction and stretching to pects and R UT/levator, PA mobs to upper thoracic  -      Manual Rx 2 Duration 17  -LH        User Key  (r) = Recorded By, (t) = Taken By, (c) = Cosigned By    Initials Name Provider Type     Felecia Lopez, PT Physical Therapist              Therapy Education  Given: HEP, Symptoms/condition management, Pain management, Posture/body mechanics  How Provided: Verbal  Provided to: Patient  Level of Understanding: Teach back education performed, Demonstrated              Time Calculation:   Start Time: 1015  Stop Time: 1100  Time Calculation (min): 45 min  Total Timed Code Minutes- PT:  43 minute(s)  Therapy Suggested Charges     Code   Minutes Charges    38695 (CPT®) Hc Pt Neuromusc Re Education Ea 15 Min      99290 (CPT®) Hc Pt Ther Proc Ea 15 Min 18 1    16718 (CPT®) Hc Gait Training Ea 15 Min      38161 (CPT®) Hc Pt Therapeutic Act Ea 15 Min      77888 (CPT®) Hc Pt Manual Therapy Ea 15 Min 25 2    90172 (CPT®) Hc Pt Ther Massage- Per 15 Min      24047 (CPT®) Hc Pt Iontophoresis Ea 15 Min      66883 (CPT®) Hc Pt Elec Stim Ea-Per 15 Min      46748 (CPT®) Hc Pt Ultrasound Ea 15 Min      49190 (CPT®) Hc Pt Self Care/Mgmt/Train Ea 15 Min      86305 (CPT®) Hc Pt Prosthetic (S) Train Initial Encounter, Each 15 Min      49958 (CPT®) Hc Orthotic(S) Mgmt/Train Initial Encounter, Each 15min      23631 (CPT®) Hc Pt Aquatic Therapy Ea 15 Min      28575 (CPT®) Hc Pt Orthotic(S)/Prosthetic(S) Encounter, Each 15 Min      Total  43 3        Therapy Charges for Today     Code Description Service Date Service Provider Modifiers Qty    26193478475 HC PT THER PROC EA 15 MIN 9/7/2018 Felecia Lopez, PT GP 1    72262552007 HC PT MANUAL THERAPY EA 15 MIN 9/7/2018 Felecia Lopez, PT GP 2                    Felecia Lopez, PT  9/7/2018

## 2018-09-14 ENCOUNTER — HOSPITAL ENCOUNTER (OUTPATIENT)
Dept: PHYSICAL THERAPY | Facility: HOSPITAL | Age: 68
Setting detail: THERAPIES SERIES
Discharge: HOME | End: 2018-09-14

## 2018-09-14 DIAGNOSIS — M54.12 CERVICAL RADICULOPATHY: ICD-10-CM

## 2018-09-14 DIAGNOSIS — M54.2 CERVICALGIA: Primary | ICD-10-CM

## 2018-09-14 PROCEDURE — G8986 CARRY D/C STATUS: HCPCS | Performed by: PHYSICAL THERAPIST

## 2018-09-14 PROCEDURE — G8985 CARRY GOAL STATUS: HCPCS | Performed by: PHYSICAL THERAPIST

## 2018-09-14 PROCEDURE — 97140 MANUAL THERAPY 1/> REGIONS: CPT | Performed by: PHYSICAL THERAPIST

## 2018-09-14 PROCEDURE — 97110 THERAPEUTIC EXERCISES: CPT | Performed by: PHYSICAL THERAPIST

## 2018-09-14 NOTE — THERAPY DISCHARGE NOTE
Outpatient Physical Therapy Ortho Progress Note/Discharge Summary  [facility]     Patient Name: Uriah Perez  : 1950  MRN: [id number]  Today's Date: 2018      Visit Date: 2018    Visit Dx:    ICD-10-CM ICD-9-CM   1. Cervicalgia M54.2 723.1   2. Cervical radiculopathy M54.12 723.4       There is no problem list on file for this patient.       No past medical history on file.     No past surgical history on file.                          PT Assessment/Plan     Row Name 18 1119          PT Assessment    Assessment Comments pt is having little to no pain in the neck or arm. He does still have mild pain with shaving, but no longer with driving. He has tingling still in the R finger tips, but it is not as intense and is starting to become intermittent. he hs a home traction unit that he is using daily to EOD. He improved his score on the Neck Disability index from 28% to 4%, with 0% being no disability.   -        PT Plan    PT Plan Comments Pt sees neuro in 10 days.   -       User Key  (r) = Recorded By, (t) = Taken By, (c) = Cosigned By    Initials Name Provider Type     Felecia Lopez, PT Physical Therapist                    Exercises     Row Name 18 1000 18 0900          Total Minutes    62463 - PT Therapeutic Exercise Minutes 15  -LH  --     85356 - PT Manual Therapy Minutes  -- 25  -LH        Exercise 1    Exercise Name 1 supine cervical nodding  -  --     Sets 1 1  -LH  --     Reps 1 10  -LH  --     Time 1 5 sec  -LH  --        Exercise 2    Exercise Name 2 shoulder rolls backward  -LH  --     Sets 2 1  -LH  --     Reps 2 10  -LH  --        Exercise 3    Exercise Name 3 scap ret and shoulder ext  -LH  --     Sets 3 1  -LH  --     Reps 3 20  -LH  --     Additional Comments blue  -LH  --        Exercise 4    Exercise Name 4 pect stretch in doorway  -LH  --     Sets 4 1  -LH  --     Reps 4 3  -LH  --     Time 4 20 sec  -LH  --        Exercise 5    Exercise  Name 5 supine horiz abd and D2 flex  -LH  --     Sets 5 1  -LH  --     Reps 5 20  -LH  --     Additional Comments blue  -LH  --        Exercise 6    Exercise Name 6 supine B ER  -LH  --     Sets 6 1  -LH  --     Reps 6 20  -LH  --     Additional Comments blue  -LH  --        Exercise 7    Exercise Name 7 lat pull down  -LH  --     Sets 7 1  -LH  --     Reps 7 20  -LH  --     Additional Comments blue  -LH  --        Exercise 8    Exercise Name 8 wall push ups  -LH  --     Sets 8 1  -LH  --     Reps 8 20  -LH  --       User Key  (r) = Recorded By, (t) = Taken By, (c) = Cosigned By    Initials Name Provider Type     Felecia Lopez, PT Physical Therapist                        Manual Rx (last 36 hours)      Manual Treatments     Row Name 09/14/18 0900             Total Minutes    98737 - PT Manual Therapy Minutes 25  -LH         Manual Rx 1    Manual Rx 1 Location B UT, R levator  -      Manual Rx 1 Type intramuscular manual therapy using threading techniques. LTRs were achieved. Palpation was done before, during, and after tx. Obtained verbal consent to treat  -      Manual Rx 1 Duration 8  -LH         Manual Rx 2    Manual Rx 2 Location B UT, levator and cervical PVMs  -      Manual Rx 2 Type STM to above with manual traction and stretching to pects and R UT/levator, PA mobs to upper thoracic  -      Manual Rx 2 Duration 17  -LH        User Key  (r) = Recorded By, (t) = Taken By, (c) = Cosigned By    Initials Name Provider Type     Felecia Lopez, PT Physical Therapist                PT OP Goals     Row Name 09/14/18 1000          PT Short Term Goals    STG 1 Patient will be independent with education for posture, symptom management, joint protection and strategies to minimize stress on affected tissues  -     STG 1 Progress Met  -     STG 2 Pt to improve pain complaints to no more than 5/10 with ADLs  -     STG 2 Progress Met  -        Long Term Goals    LTG 1 Pt to improve pain complaints  to no more than 2-3/10 with ADLs  -     LTG 1 Progress Met  -     LTG 2 Pt to reduce radicular pain to occasional reports  -     LTG 2 Progress Met  -     LTG 3 Pt able to sleep for 5 hours without waking from pain  -     LTG 3 Progress Met  -     LTG 4 Pt to improve NDI score from 28% to 12% for overall functional improvement  -     LTG 4 Progress Met  -     LTG 4 Progress Comments 4%  -     LTG 5 Pt to be independent with HEP for symptom management and strengthening  -     LT 5 Progress Met  -       User Key  (r) = Recorded By, (t) = Taken By, (c) = Cosigned By    Initials Name Provider Type     Felecia Lopez, PT Physical Therapist               Outcome Measure Options: Neck Disability Index (NDI) (4%)         Time Calculation:   Start Time: 1015  Stop Time: 1100  Time Calculation (min): 45 min  Total Timed Code Minutes- PT: 42 minute(s)  Therapy Suggested Charges     Code   Minutes Charges    92071 (CPT®) Hc Pt Neuromusc Re Education Ea 15 Min      43554 (CPT®) Hc Pt Ther Proc Ea 15 Min 15 1    35858 (CPT®) Hc Gait Training Ea 15 Min      21781 (CPT®) Hc Pt Therapeutic Act Ea 15 Min      10766 (CPT®) Hc Pt Manual Therapy Ea 15 Min 25 2    89242 (CPT®) Hc Pt Ther Massage- Per 15 Min      24680 (CPT®) Hc Pt Iontophoresis Ea 15 Min      51612 (CPT®) Hc Pt Elec Stim Ea-Per 15 Min      47411 (CPT®) Hc Pt Ultrasound Ea 15 Min      19833 (CPT®) Hc Pt Self Care/Mgmt/Train Ea 15 Min      59825 (CPT®) Hc Pt Prosthetic (S) Train Initial Encounter, Each 15 Min      15790 (CPT®) Hc Orthotic(S) Mgmt/Train Initial Encounter, Each 15min      02396 (CPT®) Hc Pt Aquatic Therapy Ea 15 Min      22499 (CPT®) Hc Pt Orthotic(S)/Prosthetic(S) Encounter, Each 15 Min      Total  40 3        Therapy Charges for Today     Code Description Service Date Service Provider Modifiers Qty    37170130376 HC PT CARRY MOV HAND OBJ PROJECTED 9/14/2018 Felecia Lopez, PT GP, CI 1    67806728290 HC PT CARRY MOV HAND OBJ  DISCHARGE 9/14/2018 Felecia Lopez, PT GP, CI 1    33967147175 HC PT THER PROC EA 15 MIN 9/14/2018 Felecia Lopez, PT GP 1    20634860530 HC PT MANUAL THERAPY EA 15 MIN 9/14/2018 Felecia Lopez, PT GP 2          PT G-Codes  Outcome Measure Options: Neck Disability Index (NDI) (4%)  Functional Limitation: Carrying, moving and handling objects  Carrying, Moving and Handling Objects Goal Status (): At least 1 percent but less than 20 percent impaired, limited or restricted  Carrying, Moving and Handling Objects Discharge Status (): At least 1 percent but less than 20 percent impaired, limited or restricted     OP PT Discharge Summary  Date of Discharge: 09/14/18  Reason for Discharge: All goals achieved  Outcomes Achieved: Able to achieve all goals within established timeline  Discharge Destination: Home with home program  Discharge Instructions/Additional Comments: HEP EOD, traction PRN      Felecia Lopez, PT  9/14/2018

## 2018-09-25 ENCOUNTER — OFFICE VISIT (OUTPATIENT)
Dept: NEUROSURGERY | Facility: CLINIC | Age: 68
End: 2018-09-25

## 2018-09-25 VITALS
WEIGHT: 210 LBS | DIASTOLIC BLOOD PRESSURE: 92 MMHG | SYSTOLIC BLOOD PRESSURE: 140 MMHG | HEIGHT: 68 IN | HEART RATE: 64 BPM | BODY MASS INDEX: 31.83 KG/M2

## 2018-09-25 DIAGNOSIS — M54.12 CERVICAL RADICULOPATHY AT C6: Primary | ICD-10-CM

## 2018-09-25 PROCEDURE — 99243 OFF/OP CNSLTJ NEW/EST LOW 30: CPT | Performed by: NEUROLOGICAL SURGERY

## 2018-09-25 RX ORDER — QUINAPRIL 10 MG/1
20 TABLET ORAL DAILY
COMMUNITY
End: 2022-05-10

## 2018-09-25 RX ORDER — ROSUVASTATIN CALCIUM 10 MG/1
10 TABLET, COATED ORAL NIGHTLY
COMMUNITY
End: 2021-11-22

## 2018-09-25 RX ORDER — ASPIRIN 81 MG/1
81 TABLET ORAL DAILY
COMMUNITY
End: 2021-01-29 | Stop reason: HOSPADM

## 2018-09-25 RX ORDER — GABAPENTIN 100 MG/1
100 CAPSULE ORAL 3 TIMES DAILY
COMMUNITY
End: 2021-01-25

## 2018-09-25 NOTE — PROGRESS NOTES
Subjective   Patient ID: Uriah Perez is a 68 y.o. male who is being seen for consultation today at the request of Lorenzo Brand, * for cervical radiculopathy. He had a cervical MRI at [facility] on 7/13/18. He presents unaccompanied.    History of Present Illness 69 yo  with history of 3 months of interscapular pain after a pilgrimage in [place].  He performed some lifting tasks around this time related to a move.  He was evaluated by his PCP and started on some analgesics and an MRI.  He began PT and continued this from mid July to mid September. He repoirts the PT has helped him significantly.  He has a littlle tingling in his right hand which is daily and intermittent.  The tingling does not split the ring finger.  No weakness.  No arm pain is residual.  His neck is not sore either at this point.  He is taking gabapentin 100mg bid and he started this end of Aug.  He has a little sleepiness.  No gait disturbance. No fine motor task issues.  He denies truncal symptoms but does has some great toe numbness.  He rates his pain as <1/10 today.  He denies BRIJESH's.  He is a prior smoker (<20 cigs this year).      The following portions of the patient's history were reviewed and updated as appropriate: allergies, current medications, past family history, past medical history, past social history, past surgical history and problem list.    Review of Systems   Constitutional: Negative for chills and fever.   Respiratory: Negative for cough and shortness of breath.    Cardiovascular: Negative for chest pain, palpitations and leg swelling.   Gastrointestinal: Negative for abdominal pain and constipation.   Genitourinary: Negative for difficulty urinating and enuresis.   Musculoskeletal: Negative for back pain, gait problem and neck pain. Arthralgias:  RUE resolved  Neck stiffness:  Resolved    Skin: Negative for rash.   Neurological: Positive for numbness (or tingling RUE (fingers) ). Negative for weakness.    Hematological: Does not bruise/bleed easily.   Psychiatric/Behavioral: Negative for sleep disturbance.       Objective   Physical Exam   Constitutional: He is oriented to person, place, and time.   Neurological: He is oriented to person, place, and time. Gait normal.   Reflex Scores:       Tricep reflexes are 1+ on the right side and 1+ on the left side.       Bicep reflexes are 1+ on the right side and 1+ on the left side.       Brachioradialis reflexes are 1+ on the right side and 1+ on the left side.       Patellar reflexes are 1+ on the right side and 1+ on the left side.       Achilles reflexes are 1+ on the right side and 1+ on the left side.    Neurologic Exam     Mental Status   Oriented to person, place, and time.     Motor Exam   Muscle bulk: normal  Overall muscle tone: normal    Strength   Right deltoid: 5/5  Left deltoid: 5/5  Right biceps: 5/5  Left biceps: 5/5  Right triceps: 5/5  Left triceps: 5/5  Right interossei: 5/5  Left interossei: 5/5  Right iliopsoas: 5/5  Left iliopsoas: 5/5  Right quadriceps: 5/5  Left quadriceps: 5/5  Right hamstrin/5  Left hamstrin/5  Right anterior tibial: 5/5  Left anterior tibial: 5/5  Right gastroc: 5/5  Left gastroc: 5/5    Sensory Exam   Right arm light touch: decreased from fingers  Left arm light touch: normal  Right leg light touch: normal  Left leg light touch: normal  Right arm pinprick: decreased from fingers  Left arm pinprick: normal  Right leg pinprick: normal  Left leg pinprick: normal    Gait, Coordination, and Reflexes     Gait  Gait: normal    Reflexes   Right brachioradialis: 1+  Left brachioradialis: 1+  Right biceps: 1+  Left biceps: 1+  Right triceps: 1+  Left triceps: 1+  Right patellar: 1+  Left patellar: 1+  Right achilles: 1+  Left achilles: 1+  Right Caldwell: absent  Left Caldwell: absent  Right ankle clonus: absent  Left ankle clonus: absent           Assessment/Plan   Independent Review of Radiographic Studies:  Mild overall ddd from   with various mild foraminal encroachments.      Medical Decision Making:  Patient has made good progress with non operative management.  He has minimal residual symptoms which would be difficult to mitigate further with surgery.  At this juncture I suggested he titrate the gabapentin up to tid.  We discussed BRIJESH's as well as another adjunct.  I think we should hold on BRIJESH's.  I will be glad to see him if he suffers another flare up.  I provided my card and he will call for any issues.        Uriah was seen today for neck pain, arm pain and tingling.    Diagnoses and all orders for this visit:    Cervical radiculopathy at C6      No Follow-up on file.

## 2019-02-13 ENCOUNTER — HOSPITAL ENCOUNTER (OUTPATIENT)
Dept: LAB | Facility: HOSPITAL | Age: 69
Discharge: HOME | End: 2019-02-13
Attending: INTERNAL MEDICINE

## 2019-02-13 LAB
ALBUMIN SERPL-MCNC: 4 G/DL (ref 3.5–5)
ALBUMIN/GLOB SERPL: 1.7 {RATIO} (ref 1.4–2.6)
ALP SERPL-CCNC: 56 U/L (ref 56–155)
ALT SERPL-CCNC: 25 U/L (ref 10–40)
ANION GAP SERPL CALC-SCNC: 16 MMOL/L (ref 8–19)
AST SERPL-CCNC: 21 U/L (ref 15–50)
BASOPHILS # BLD AUTO: 0.06 10*3/UL (ref 0–0.2)
BASOPHILS NFR BLD AUTO: 0.67 % (ref 0–3)
BILIRUB SERPL-MCNC: 1.89 MG/DL (ref 0.2–1.3)
BUN SERPL-MCNC: 14 MG/DL (ref 5–25)
BUN/CREAT SERPL: 14 {RATIO} (ref 6–20)
CALCIUM SERPL-MCNC: 9.5 MG/DL (ref 8.7–10.4)
CHLORIDE SERPL-SCNC: 105 MMOL/L (ref 99–111)
CHOLEST SERPL-MCNC: 213 MG/DL (ref 107–200)
CHOLEST/HDLC SERPL: 3.8 {RATIO} (ref 3–6)
CONV CO2: 27 MMOL/L (ref 22–32)
CONV TOTAL PROTEIN: 6.3 G/DL (ref 6.3–8.2)
CREAT UR-MCNC: 0.97 MG/DL (ref 0.7–1.2)
EOSINOPHIL # BLD AUTO: 0.33 10*3/UL (ref 0–0.7)
EOSINOPHIL # BLD AUTO: 3.49 % (ref 0–7)
ERYTHROCYTE [DISTWIDTH] IN BLOOD BY AUTOMATED COUNT: 12 % (ref 11.5–14.5)
EST. AVERAGE GLUCOSE BLD GHB EST-MCNC: 117 MG/DL
GFR SERPLBLD BASED ON 1.73 SQ M-ARVRAT: >60 ML/MIN/{1.73_M2}
GLOBULIN UR ELPH-MCNC: 2.3 G/DL (ref 2–3.5)
GLUCOSE SERPL-MCNC: 107 MG/DL (ref 70–99)
HBA1C MFR BLD: 15 G/DL (ref 14–18)
HBA1C MFR BLD: 5.7 % (ref 3.5–5.7)
HCT VFR BLD AUTO: 43.7 % (ref 42–52)
HDLC SERPL-MCNC: 56 MG/DL (ref 40–60)
LDLC SERPL CALC-MCNC: 122 MG/DL (ref 70–100)
LYMPHOCYTES # BLD AUTO: 3.67 10*3/UL (ref 1–5)
MCH RBC QN AUTO: 31 PG (ref 27–31)
MCHC RBC AUTO-ENTMCNC: 34.3 G/DL (ref 33–37)
MCV RBC AUTO: 90.3 FL (ref 80–96)
MONOCYTES # BLD AUTO: 0.81 10*3/UL (ref 0.2–1.2)
MONOCYTES NFR BLD AUTO: 8.45 % (ref 3–10)
NEUTROPHILS # BLD AUTO: 4.71 10*3/UL (ref 2–8)
NEUTROPHILS NFR BLD AUTO: 49.1 % (ref 30–85)
NRBC BLD AUTO-RTO: 0 % (ref 0–0.01)
OSMOLALITY SERPL CALC.SUM OF ELEC: 299 MOSM/KG (ref 273–304)
PLATELET # BLD AUTO: 243 10*3/UL (ref 130–400)
PMV BLD AUTO: 8.9 FL (ref 7.4–10.4)
POTASSIUM SERPL-SCNC: 4.4 MMOL/L (ref 3.5–5.3)
RBC # BLD AUTO: 4.84 10*6/UL (ref 4.7–6.1)
SODIUM SERPL-SCNC: 144 MMOL/L (ref 135–147)
TRIGL SERPL-MCNC: 173 MG/DL (ref 40–150)
VARIANT LYMPHS NFR BLD MANUAL: 38.2 % (ref 20–45)
VLDLC SERPL-MCNC: 35 MG/DL (ref 5–37)
WBC # BLD AUTO: 9.58 10*3/UL (ref 4.8–10.8)

## 2019-06-26 ENCOUNTER — HOSPITAL ENCOUNTER (OUTPATIENT)
Dept: LAB | Facility: HOSPITAL | Age: 69
Discharge: HOME | End: 2019-06-26
Attending: INTERNAL MEDICINE

## 2019-06-26 LAB
ALBUMIN SERPL-MCNC: 4.2 G/DL (ref 3.5–5)
ALP SERPL-CCNC: 55 U/L (ref 56–155)
ALT SERPL-CCNC: 20 U/L (ref 10–40)
AST SERPL-CCNC: 17 U/L (ref 15–50)
BILIRUB SERPL-MCNC: 1.13 MG/DL (ref 0.2–1.3)
CONV BILI, CONJUGATED: <0.2 MG/DL (ref 0–0.6)
CONV TOTAL PROTEIN: 6.5 G/DL (ref 6.3–8.2)
CONV UNCONJUGATED BILIRUBIN: 0.9 MG/DL (ref 0–1.1)

## 2019-10-23 ENCOUNTER — HOSPITAL ENCOUNTER (OUTPATIENT)
Dept: LAB | Facility: HOSPITAL | Age: 69
Discharge: HOME | End: 2019-10-23
Attending: INTERNAL MEDICINE

## 2019-10-23 LAB
ALBUMIN SERPL-MCNC: 4.2 G/DL (ref 3.5–5)
ALBUMIN/GLOB SERPL: 2.1 {RATIO} (ref 1.4–2.6)
ALP SERPL-CCNC: 58 U/L (ref 56–155)
ALT SERPL-CCNC: 21 U/L (ref 10–40)
ANION GAP SERPL CALC-SCNC: 18 MMOL/L (ref 8–19)
AST SERPL-CCNC: 18 U/L (ref 15–50)
BASOPHILS # BLD AUTO: 0.12 10*3/UL (ref 0–0.2)
BASOPHILS NFR BLD AUTO: 0.7 % (ref 0–3)
BILIRUB SERPL-MCNC: 1.23 MG/DL (ref 0.2–1.3)
BUN SERPL-MCNC: 18 MG/DL (ref 5–25)
BUN/CREAT SERPL: 18 {RATIO} (ref 6–20)
CALCIUM SERPL-MCNC: 9.7 MG/DL (ref 8.7–10.4)
CHLORIDE SERPL-SCNC: 104 MMOL/L (ref 99–111)
CHOLEST SERPL-MCNC: 188 MG/DL (ref 107–200)
CHOLEST/HDLC SERPL: 3.4 {RATIO} (ref 3–6)
CONV ABS IMM GRAN: 0.08 10*3/UL (ref 0–0.2)
CONV CO2: 25 MMOL/L (ref 22–32)
CONV IMMATURE GRAN: 0.5 % (ref 0–1.8)
CONV TOTAL PROTEIN: 6.2 G/DL (ref 6.3–8.2)
CREAT UR-MCNC: 1.01 MG/DL (ref 0.7–1.2)
DEPRECATED RDW RBC AUTO: 42.5 FL (ref 35.1–43.9)
EOSINOPHIL # BLD AUTO: 0.6 10*3/UL (ref 0–0.7)
EOSINOPHIL # BLD AUTO: 3.6 % (ref 0–7)
ERYTHROCYTE [DISTWIDTH] IN BLOOD BY AUTOMATED COUNT: 12.6 % (ref 11.6–14.4)
EST. AVERAGE GLUCOSE BLD GHB EST-MCNC: 114 MG/DL
GFR SERPLBLD BASED ON 1.73 SQ M-ARVRAT: >60 ML/MIN/{1.73_M2}
GLOBULIN UR ELPH-MCNC: 2 G/DL (ref 2–3.5)
GLUCOSE SERPL-MCNC: 106 MG/DL (ref 70–99)
HBA1C MFR BLD: 5.6 % (ref 3.5–5.7)
HCT VFR BLD AUTO: 45.6 % (ref 42–52)
HDLC SERPL-MCNC: 56 MG/DL (ref 40–60)
HGB BLD-MCNC: 15 G/DL (ref 14–18)
LDLC SERPL CALC-MCNC: 97 MG/DL (ref 70–100)
LYMPHOCYTES # BLD AUTO: 3.99 10*3/UL (ref 1–5)
LYMPHOCYTES NFR BLD AUTO: 24.1 % (ref 20–45)
MCH RBC QN AUTO: 30.1 PG (ref 27–31)
MCHC RBC AUTO-ENTMCNC: 32.9 G/DL (ref 33–37)
MCV RBC AUTO: 91.6 FL (ref 80–96)
MONOCYTES # BLD AUTO: 1.22 10*3/UL (ref 0.2–1.2)
MONOCYTES NFR BLD AUTO: 7.4 % (ref 3–10)
NEUTROPHILS # BLD AUTO: 10.53 10*3/UL (ref 2–8)
NEUTROPHILS NFR BLD AUTO: 63.7 % (ref 30–85)
NRBC CBCN: 0 % (ref 0–0.7)
OSMOLALITY SERPL CALC.SUM OF ELEC: 296 MOSM/KG (ref 273–304)
PLATELET # BLD AUTO: 249 10*3/UL (ref 130–400)
PMV BLD AUTO: 11.5 FL (ref 9.4–12.4)
POTASSIUM SERPL-SCNC: 4.6 MMOL/L (ref 3.5–5.3)
RBC # BLD AUTO: 4.98 10*6/UL (ref 4.7–6.1)
SODIUM SERPL-SCNC: 142 MMOL/L (ref 135–147)
TRIGL SERPL-MCNC: 176 MG/DL (ref 40–150)
VLDLC SERPL-MCNC: 35 MG/DL (ref 5–37)
WBC # BLD AUTO: 16.54 10*3/UL (ref 4.8–10.8)

## 2020-04-01 ENCOUNTER — HOSPITAL ENCOUNTER (OUTPATIENT)
Dept: LAB | Facility: HOSPITAL | Age: 70
Discharge: HOME | End: 2020-04-01
Attending: INTERNAL MEDICINE

## 2020-04-01 LAB
ALBUMIN SERPL-MCNC: 4.3 G/DL (ref 3.5–5)
ALBUMIN/GLOB SERPL: 1.8 {RATIO} (ref 1.4–2.6)
ALP SERPL-CCNC: 57 U/L (ref 56–155)
ALT SERPL-CCNC: 39 U/L (ref 10–40)
ANION GAP SERPL CALC-SCNC: 20 MMOL/L (ref 8–19)
AST SERPL-CCNC: 31 U/L (ref 15–50)
BASOPHILS # BLD AUTO: 0.12 10*3/UL (ref 0–0.2)
BASOPHILS NFR BLD AUTO: 1 % (ref 0–3)
BILIRUB SERPL-MCNC: 0.77 MG/DL (ref 0.2–1.3)
BUN SERPL-MCNC: 24 MG/DL (ref 5–25)
BUN/CREAT SERPL: 22 {RATIO} (ref 6–20)
CALCIUM SERPL-MCNC: 9.6 MG/DL (ref 8.7–10.4)
CHLORIDE SERPL-SCNC: 107 MMOL/L (ref 99–111)
CONV ABS IMM GRAN: 0.04 10*3/UL (ref 0–0.2)
CONV CO2: 23 MMOL/L (ref 22–32)
CONV IMMATURE GRAN: 0.3 % (ref 0–1.8)
CONV TOTAL PROTEIN: 6.7 G/DL (ref 6.3–8.2)
CREAT UR-MCNC: 1.09 MG/DL (ref 0.7–1.2)
DEPRECATED RDW RBC AUTO: 43.4 FL (ref 35.1–43.9)
EOSINOPHIL # BLD AUTO: 0.43 10*3/UL (ref 0–0.7)
EOSINOPHIL # BLD AUTO: 3.4 % (ref 0–7)
ERYTHROCYTE [DISTWIDTH] IN BLOOD BY AUTOMATED COUNT: 12.8 % (ref 11.6–14.4)
EST. AVERAGE GLUCOSE BLD GHB EST-MCNC: 120 MG/DL
GFR SERPLBLD BASED ON 1.73 SQ M-ARVRAT: >60 ML/MIN/{1.73_M2}
GLOBULIN UR ELPH-MCNC: 2.4 G/DL (ref 2–3.5)
GLUCOSE SERPL-MCNC: 121 MG/DL (ref 70–99)
HBA1C MFR BLD: 5.8 % (ref 3.5–5.7)
HCT VFR BLD AUTO: 46.7 % (ref 42–52)
HGB BLD-MCNC: 15.2 G/DL (ref 14–18)
LYMPHOCYTES # BLD AUTO: 4.52 10*3/UL (ref 1–5)
LYMPHOCYTES NFR BLD AUTO: 36.1 % (ref 20–45)
MCH RBC QN AUTO: 30 PG (ref 27–31)
MCHC RBC AUTO-ENTMCNC: 32.5 G/DL (ref 33–37)
MCV RBC AUTO: 92.3 FL (ref 80–96)
MONOCYTES # BLD AUTO: 0.94 10*3/UL (ref 0.2–1.2)
MONOCYTES NFR BLD AUTO: 7.5 % (ref 3–10)
NEUTROPHILS # BLD AUTO: 6.46 10*3/UL (ref 2–8)
NEUTROPHILS NFR BLD AUTO: 51.7 % (ref 30–85)
NRBC CBCN: 0 % (ref 0–0.7)
OSMOLALITY SERPL CALC.SUM OF ELEC: 305 MOSM/KG (ref 273–304)
PLATELET # BLD AUTO: 235 10*3/UL (ref 130–400)
PMV BLD AUTO: 11.7 FL (ref 9.4–12.4)
POTASSIUM SERPL-SCNC: 4.7 MMOL/L (ref 3.5–5.3)
RBC # BLD AUTO: 5.06 10*6/UL (ref 4.7–6.1)
SODIUM SERPL-SCNC: 145 MMOL/L (ref 135–147)
WBC # BLD AUTO: 12.51 10*3/UL (ref 4.8–10.8)

## 2020-08-19 ENCOUNTER — TRANSCRIBE ORDERS (OUTPATIENT)
Dept: ADMINISTRATIVE | Facility: HOSPITAL | Age: 70
End: 2020-08-19

## 2020-08-19 DIAGNOSIS — R97.20 ELEVATED PSA: Primary | ICD-10-CM

## 2020-09-03 ENCOUNTER — HOSPITAL ENCOUNTER (OUTPATIENT)
Dept: MRI IMAGING | Facility: HOSPITAL | Age: 70
Discharge: HOME | End: 2020-09-03
Admitting: UROLOGY

## 2020-09-03 DIAGNOSIS — R97.20 ELEVATED PSA: ICD-10-CM

## 2020-09-03 LAB — CREAT BLDA-MCNC: 1 MG/DL (ref 0.6–1.3)

## 2020-09-03 PROCEDURE — A9575 INJ GADOTERATE MEGLUMI 0.1ML: HCPCS | Performed by: UROLOGY

## 2020-09-03 PROCEDURE — 82565 ASSAY OF CREATININE: CPT

## 2020-09-03 PROCEDURE — 25010000002 GADOTERATE MEGLUMINE 10 MMOL/20ML SOLUTION: Performed by: UROLOGY

## 2020-09-03 PROCEDURE — 72197 MRI PELVIS W/O & W/DYE: CPT

## 2020-09-03 RX ORDER — GADOTERATE MEGLUMINE 376.9 MG/ML
20 INJECTION INTRAVENOUS
Status: COMPLETED | OUTPATIENT
Start: 2020-09-03 | End: 2020-09-03

## 2020-09-03 RX ADMIN — GADOTERATE MEGLUMINE 20 ML: 376.9 INJECTION, SOLUTION INTRAVENOUS at 11:19

## 2020-09-24 ENCOUNTER — TRANSCRIBE ORDERS (OUTPATIENT)
Dept: ADMINISTRATIVE | Facility: HOSPITAL | Age: 70
End: 2020-09-24

## 2020-09-24 DIAGNOSIS — C61 MALIGNANT NEOPLASM OF PROSTATE (HCC): Primary | ICD-10-CM

## 2020-10-12 ENCOUNTER — APPOINTMENT (OUTPATIENT)
Dept: OTHER | Facility: HOSPITAL | Age: 70
End: 2020-10-12

## 2020-10-12 ENCOUNTER — HOSPITAL ENCOUNTER (OUTPATIENT)
Dept: NUCLEAR MEDICINE | Facility: HOSPITAL | Age: 70
Discharge: HOME | End: 2020-10-12

## 2020-10-12 ENCOUNTER — HOSPITAL ENCOUNTER (OUTPATIENT)
Dept: GENERAL RADIOLOGY | Facility: HOSPITAL | Age: 70
Discharge: HOME | End: 2020-10-12

## 2020-10-12 DIAGNOSIS — R52 PAIN: ICD-10-CM

## 2020-10-12 DIAGNOSIS — C61 MALIGNANT NEOPLASM OF PROSTATE (HCC): ICD-10-CM

## 2020-10-12 DIAGNOSIS — Z09 FOLLOW UP: ICD-10-CM

## 2020-10-12 PROCEDURE — 0 TECHNETIUM MEDRONATE KIT: Performed by: UROLOGY

## 2020-10-12 PROCEDURE — 78306 BONE IMAGING WHOLE BODY: CPT

## 2020-10-12 PROCEDURE — 71101 X-RAY EXAM UNILAT RIBS/CHEST: CPT

## 2020-10-12 PROCEDURE — A9503 TC99M MEDRONATE: HCPCS | Performed by: UROLOGY

## 2020-10-12 RX ORDER — TC 99M MEDRONATE 20 MG/10ML
21.8 INJECTION, POWDER, LYOPHILIZED, FOR SOLUTION INTRAVENOUS
Status: COMPLETED | OUTPATIENT
Start: 2020-10-12 | End: 2020-10-12

## 2020-10-12 RX ADMIN — Medication 21.8 MILLICURIE: at 12:09

## 2020-11-16 ENCOUNTER — HOSPITAL ENCOUNTER (OUTPATIENT)
Dept: LAB | Facility: HOSPITAL | Age: 70
Discharge: HOME | End: 2020-11-16
Attending: INTERNAL MEDICINE

## 2020-11-16 LAB
ALBUMIN SERPL-MCNC: 4.1 G/DL (ref 3.5–5)
ALBUMIN/GLOB SERPL: 1.5 {RATIO} (ref 1.4–2.6)
ALP SERPL-CCNC: 62 U/L (ref 56–155)
ALT SERPL-CCNC: 35 U/L (ref 10–40)
ANION GAP SERPL CALC-SCNC: 16 MMOL/L (ref 8–19)
AST SERPL-CCNC: 24 U/L (ref 15–50)
BASOPHILS # BLD AUTO: 0.05 10*3/UL (ref 0–0.2)
BASOPHILS NFR BLD AUTO: 0.6 % (ref 0–3)
BILIRUB SERPL-MCNC: 0.66 MG/DL (ref 0.2–1.3)
BUN SERPL-MCNC: 16 MG/DL (ref 5–25)
BUN/CREAT SERPL: 15 {RATIO} (ref 6–20)
CALCIUM SERPL-MCNC: 9.9 MG/DL (ref 8.7–10.4)
CHLORIDE SERPL-SCNC: 108 MMOL/L (ref 99–111)
CHOLEST SERPL-MCNC: 126 MG/DL (ref 107–200)
CHOLEST/HDLC SERPL: 3.3 {RATIO} (ref 3–6)
CONV ABS IMM GRAN: 0.06 10*3/UL (ref 0–0.2)
CONV CO2: 24 MMOL/L (ref 22–32)
CONV IMMATURE GRAN: 0.7 % (ref 0–1.8)
CONV TOTAL PROTEIN: 6.8 G/DL (ref 6.3–8.2)
CREAT UR-MCNC: 1.05 MG/DL (ref 0.7–1.2)
DEPRECATED RDW RBC AUTO: 42.2 FL (ref 35.1–43.9)
EOSINOPHIL # BLD AUTO: 0.16 10*3/UL (ref 0–0.7)
EOSINOPHIL # BLD AUTO: 1.8 % (ref 0–7)
ERYTHROCYTE [DISTWIDTH] IN BLOOD BY AUTOMATED COUNT: 12.4 % (ref 11.6–14.4)
EST. AVERAGE GLUCOSE BLD GHB EST-MCNC: 146 MG/DL
GFR SERPLBLD BASED ON 1.73 SQ M-ARVRAT: >60 ML/MIN/{1.73_M2}
GLOBULIN UR ELPH-MCNC: 2.7 G/DL (ref 2–3.5)
GLUCOSE SERPL-MCNC: 91 MG/DL (ref 70–99)
HBA1C MFR BLD: 6.7 % (ref 3.5–5.7)
HCT VFR BLD AUTO: 45.4 % (ref 42–52)
HDLC SERPL-MCNC: 38 MG/DL (ref 40–60)
HGB BLD-MCNC: 14.5 G/DL (ref 14–18)
LDLC SERPL CALC-MCNC: 67 MG/DL (ref 70–100)
LYMPHOCYTES # BLD AUTO: 2.73 10*3/UL (ref 1–5)
LYMPHOCYTES NFR BLD AUTO: 30 % (ref 20–45)
MCH RBC QN AUTO: 29.8 PG (ref 27–31)
MCHC RBC AUTO-ENTMCNC: 31.9 G/DL (ref 33–37)
MCV RBC AUTO: 93.2 FL (ref 80–96)
MONOCYTES # BLD AUTO: 0.5 10*3/UL (ref 0.2–1.2)
MONOCYTES NFR BLD AUTO: 5.5 % (ref 3–10)
NEUTROPHILS # BLD AUTO: 5.59 10*3/UL (ref 2–8)
NEUTROPHILS NFR BLD AUTO: 61.4 % (ref 30–85)
NRBC CBCN: 0 % (ref 0–0.7)
OSMOLALITY SERPL CALC.SUM OF ELEC: 299 MOSM/KG (ref 273–304)
PLATELET # BLD AUTO: 290 10*3/UL (ref 130–400)
PMV BLD AUTO: 11.6 FL (ref 9.4–12.4)
POTASSIUM SERPL-SCNC: 4.4 MMOL/L (ref 3.5–5.3)
RBC # BLD AUTO: 4.87 10*6/UL (ref 4.7–6.1)
SODIUM SERPL-SCNC: 144 MMOL/L (ref 135–147)
TRIGL SERPL-MCNC: 104 MG/DL (ref 40–150)
TSH SERPL-ACNC: 2.02 M[IU]/L (ref 0.27–4.2)
VLDLC SERPL-MCNC: 21 MG/DL (ref 5–37)
WBC # BLD AUTO: 9.09 10*3/UL (ref 4.8–10.8)

## 2021-01-25 ENCOUNTER — HOSPITAL ENCOUNTER (OUTPATIENT)
Dept: GENERAL RADIOLOGY | Facility: HOSPITAL | Age: 71
Discharge: HOME | End: 2021-01-25

## 2021-01-25 ENCOUNTER — APPOINTMENT (OUTPATIENT)
Dept: PREADMISSION TESTING | Facility: HOSPITAL | Age: 71
End: 2021-01-25

## 2021-01-25 VITALS
DIASTOLIC BLOOD PRESSURE: 79 MMHG | HEART RATE: 70 BPM | RESPIRATION RATE: 20 BRPM | WEIGHT: 240.4 LBS | HEIGHT: 68 IN | OXYGEN SATURATION: 99 % | SYSTOLIC BLOOD PRESSURE: 161 MMHG | BODY MASS INDEX: 36.43 KG/M2 | TEMPERATURE: 97.7 F

## 2021-01-25 LAB
ABO GROUP BLD: NORMAL
ANION GAP SERPL CALCULATED.3IONS-SCNC: 10.5 MMOL/L (ref 5–15)
BLD GP AB SCN SERPL QL: NEGATIVE
BUN SERPL-MCNC: 23 MG/DL (ref 8–23)
BUN/CREAT SERPL: 29.5 (ref 7–25)
CALCIUM SPEC-SCNC: 9.3 MG/DL (ref 8.6–10.5)
CHLORIDE SERPL-SCNC: 108 MMOL/L (ref 98–107)
CO2 SERPL-SCNC: 22.5 MMOL/L (ref 22–29)
CREAT SERPL-MCNC: 0.78 MG/DL (ref 0.76–1.27)
DEPRECATED RDW RBC AUTO: 45.1 FL (ref 37–54)
ERYTHROCYTE [DISTWIDTH] IN BLOOD BY AUTOMATED COUNT: 13.3 % (ref 12.3–15.4)
GFR SERPL CREATININE-BSD FRML MDRD: 99 ML/MIN/1.73
GLUCOSE SERPL-MCNC: 96 MG/DL (ref 65–99)
HCT VFR BLD AUTO: 44.5 % (ref 37.5–51)
HGB BLD-MCNC: 15 G/DL (ref 13–17.7)
MCH RBC QN AUTO: 30.9 PG (ref 26.6–33)
MCHC RBC AUTO-ENTMCNC: 33.7 G/DL (ref 31.5–35.7)
MCV RBC AUTO: 91.8 FL (ref 79–97)
PLATELET # BLD AUTO: 238 10*3/MM3 (ref 140–450)
PMV BLD AUTO: 10.7 FL (ref 6–12)
POTASSIUM SERPL-SCNC: 4.4 MMOL/L (ref 3.5–5.2)
QT INTERVAL: 400 MS
RBC # BLD AUTO: 4.85 10*6/MM3 (ref 4.14–5.8)
RH BLD: POSITIVE
SODIUM SERPL-SCNC: 141 MMOL/L (ref 136–145)
T&S EXPIRATION DATE: NORMAL
WBC # BLD AUTO: 10.93 10*3/MM3 (ref 3.4–10.8)

## 2021-01-25 PROCEDURE — 80048 BASIC METABOLIC PNL TOTAL CA: CPT

## 2021-01-25 PROCEDURE — U0004 COV-19 TEST NON-CDC HGH THRU: HCPCS | Performed by: NURSE PRACTITIONER

## 2021-01-25 PROCEDURE — 71046 X-RAY EXAM CHEST 2 VIEWS: CPT

## 2021-01-25 PROCEDURE — 93010 ELECTROCARDIOGRAM REPORT: CPT | Performed by: INTERNAL MEDICINE

## 2021-01-25 PROCEDURE — C9803 HOPD COVID-19 SPEC COLLECT: HCPCS | Performed by: NURSE PRACTITIONER

## 2021-01-25 PROCEDURE — 86850 RBC ANTIBODY SCREEN: CPT

## 2021-01-25 PROCEDURE — 36415 COLL VENOUS BLD VENIPUNCTURE: CPT

## 2021-01-25 PROCEDURE — 86901 BLOOD TYPING SEROLOGIC RH(D): CPT

## 2021-01-25 PROCEDURE — 85027 COMPLETE CBC AUTOMATED: CPT

## 2021-01-25 PROCEDURE — 93005 ELECTROCARDIOGRAM TRACING: CPT

## 2021-01-25 PROCEDURE — 86900 BLOOD TYPING SEROLOGIC ABO: CPT

## 2021-01-25 NOTE — DISCHARGE INSTRUCTIONS
Take the following medications the morning of surgery with a small sip of water:  NONE    ARRIVE TO [facility] OR [facility] 1- AT 5:30AM      If you are on prescription narcotic pain medication to control your pain you may also take that medication the morning of surgery.    General Instructions:  • Do not eat or drink anything after midnight the night before surgery.  • Infants may have breast milk up to four hours before surgery.  • Infants drinking formula may drink formula up to six hours before surgery.   • Patients who avoid smoking, chewing tobacco and alcohol for 4 weeks prior to surgery have a reduced risk of post-operative complications.  Quit smoking as many days before surgery as you can.  • Do not smoke, use chewing tobacco or drink alcohol the day of surgery.   • If applicable bring your C-PAP/ BI-PAP machine.  • Bring any papers given to you in the doctor’s office.  • Wear clean comfortable clothes.  • Do not wear contact lenses, false eyelashes or make-up.  Bring a case for your glasses.   • Bring crutches or walker if applicable.  • Remove all piercings.  Leave jewelry and any other valuables at home.  • Hair extensions with metal clips must be removed prior to surgery.  • The Pre-Admission Testing nurse will instruct you to bring medications if unable to obtain an accurate list in Pre-Admission Testing.        If you were given a blood bank ID arm band remember to bring it with you the day of surgery.    Preventing a Surgical Site Infection:  • For 2 to 3 days before surgery, avoid shaving with a razor because the razor can irritate skin and make it easier to develop an infection.    • Any areas of open skin can increase the risk of a post-operative wound infection by allowing bacteria to enter and travel throughout the body.  Notify your surgeon if you have any skin wounds / rashes even if it is not near the expected surgical site.  The area will need assessed to determine if surgery should be delayed  until it is healed.  • The night prior to surgery shower using a fresh bar of anti-bacterial soap (such as Dial) and clean washcloth.  Sleep in a clean bed with clean clothing.  Do not allow pets to sleep with you.  • Shower on the morning of surgery using a fresh bar of anti-bacterial soap (such as Dial) and clean washcloth.  Dry with a clean towel and dress in clean clothing.  • Ask your surgeon if you will be receiving antibiotics prior to surgery.  • Make sure you, your family, and all healthcare providers clean their hands with soap and water or an alcohol based hand  before caring for you or your wound.    Day of surgery:  Your arrival time is approximately two hours before your scheduled surgery time.  Upon arrival, a Pre-op nurse and Anesthesiologist will review your health history, obtain vital signs, and answer questions you may have.  The only belongings needed at this time will be your home medications and if applicable your C-PAP/BI-PAP machine.  A Pre-op nurse will start an IV and you may receive medication in preparation for surgery, including something to help you relax.      Please be aware that surgery does come with discomfort.  We want to make every effort to control your discomfort so please discuss any uncontrolled symptoms with your nurse.   Your doctor will most likely have prescribed pain medications.      If you are going home after surgery you will receive individualized written care instructions before being discharged.  A responsible adult must drive you to and from the hospital on the day of your surgery and stay with you for 24 hours.  Discharge prescriptions can be filled by the hospital pharmacy during regular pharmacy hours.  If you are having surgery late in the day/evening your prescription may be e-prescribed to your pharmacy.  Please verify your pharmacy hours or chose a 24 hour pharmacy to avoid not having access to your prescription because your pharmacy has closed  for the day.    If you are staying overnight following surgery, you will be transported to your hospital room following the recovery period.  [facility] has all private rooms.    If you have any questions please call Pre-Admission Testing at [phone].  Deductibles and co-payments are collected on the day of service. Please be prepared to pay the required co-pay, deductible or deposit on the day of service as defined by your plan.    Patient Education for Self-Quarantine Process    Following your COVID testing, we strongly recommend that you do not leave your home after you have been tested for COVID except to get medical care. This includes not going to work, school or to public areas.  If this is not possible for you to do please limit your activities to only required outings.  Be sure to wear a mask when you are with other people, practice social distancing and wash your hands frequently.      The following items provide additional details to keep you safe.  • Wash your hands with soap and water frequently for at least 20 seconds.   • Avoid touching your eyes, nose and mouth with unwashed hands.  • Do not share anything - utensils, towels, food from the same bowl.   • Have your own utensils, drinking glass, dishes, towels and bedding.   • Do not have visitors.   • Do use FaceTime to stay in touch with family and friends.  • You should stay in a specific room away from others if possible.   • Stay at least 6 feet away from others in the home if you cannot have a dedicated room to yourself.   • Do not snuggle with your pet. While the CDC says there is no evidence that pets can spread COVID-19 or be infected from humans, it is probably best to avoid “petting, snuggling, being kissed or licked and sharing food (during self-quarantine)”, according to the CDC.   • Sanitize household surfaces daily. Include all high touch areas (door handles, light switches, phones, countertops, etc.)  • Do not share  a bathroom with others, if possible.   • Wear a mask around others in your home if you are unable to stay in a separate room or 6 feet apart. If  you are unable to wear a mask, have your family member wear a mask if they must be within 6 feet of you.   Call your surgeon immediately if you experience any of the following symptoms:  • Sore Throat  • Shortness of Breath or difficulty breathing  • Cough  • Chills  • Body soreness or muscle pain  • Headache  • Fever  • New loss of taste or smell  • Do not arrive for your surgery ill.  Your procedure will need to be rescheduled to another time.  You will need to call your physician before the day of surgery to avoid any unnecessary exposure to hospital staff as well as other patients.

## 2021-01-26 LAB — SARS-COV-2 RNA RESP QL NAA+PROBE: DETECTED

## 2021-01-27 ENCOUNTER — ANESTHESIA EVENT (OUTPATIENT)
Dept: PERIOP | Facility: HOSPITAL | Age: 71
End: 2021-01-27

## 2021-01-28 ENCOUNTER — HOSPITAL ENCOUNTER (OUTPATIENT)
Facility: HOSPITAL | Age: 71
Discharge: HOME | End: 2021-01-29
Attending: UROLOGY | Admitting: UROLOGY

## 2021-01-28 ENCOUNTER — ANESTHESIA (OUTPATIENT)
Dept: PERIOP | Facility: HOSPITAL | Age: 71
End: 2021-01-28

## 2021-01-28 DIAGNOSIS — C61 PROSTATE CANCER (HCC): Primary | ICD-10-CM

## 2021-01-28 LAB
ANION GAP SERPL CALCULATED.3IONS-SCNC: 10.5 MMOL/L (ref 5–15)
BUN SERPL-MCNC: 20 MG/DL (ref 8–23)
BUN/CREAT SERPL: 19 (ref 7–25)
CALCIUM SPEC-SCNC: 8.9 MG/DL (ref 8.6–10.5)
CHLORIDE SERPL-SCNC: 104 MMOL/L (ref 98–107)
CO2 SERPL-SCNC: 25.5 MMOL/L (ref 22–29)
CREAT SERPL-MCNC: 1.05 MG/DL (ref 0.76–1.27)
DEPRECATED RDW RBC AUTO: 42.2 FL (ref 37–54)
ERYTHROCYTE [DISTWIDTH] IN BLOOD BY AUTOMATED COUNT: 13.1 % (ref 12.3–15.4)
GFR SERPL CREATININE-BSD FRML MDRD: 70 ML/MIN/1.73
GLUCOSE SERPL-MCNC: 167 MG/DL (ref 65–99)
HCT VFR BLD AUTO: 41.6 % (ref 37.5–51)
HGB BLD-MCNC: 14.2 G/DL (ref 13–17.7)
MCH RBC QN AUTO: 30.3 PG (ref 26.6–33)
MCHC RBC AUTO-ENTMCNC: 34.1 G/DL (ref 31.5–35.7)
MCV RBC AUTO: 88.9 FL (ref 79–97)
PLATELET # BLD AUTO: 245 10*3/MM3 (ref 140–450)
PMV BLD AUTO: 10.5 FL (ref 6–12)
POTASSIUM SERPL-SCNC: 5 MMOL/L (ref 3.5–5.2)
RBC # BLD AUTO: 4.68 10*6/MM3 (ref 4.14–5.8)
SODIUM SERPL-SCNC: 140 MMOL/L (ref 136–145)
WBC # BLD AUTO: 18.38 10*3/MM3 (ref 3.4–10.8)

## 2021-01-28 PROCEDURE — 25010000002 FENTANYL CITRATE (PF) 100 MCG/2ML SOLUTION: Performed by: ANESTHESIOLOGY

## 2021-01-28 PROCEDURE — 88341 IMHCHEM/IMCYTCHM EA ADD ANTB: CPT | Performed by: UROLOGY

## 2021-01-28 PROCEDURE — 25010000003 CEFAZOLIN IN DEXTROSE 2-4 GM/100ML-% SOLUTION: Performed by: UROLOGY

## 2021-01-28 PROCEDURE — 25010000002 ALBUMIN HUMAN 5% PER 50 ML: Performed by: NURSE ANESTHETIST, CERTIFIED REGISTERED

## 2021-01-28 PROCEDURE — 25010000002 PROPOFOL 10 MG/ML EMULSION: Performed by: NURSE ANESTHETIST, CERTIFIED REGISTERED

## 2021-01-28 PROCEDURE — 25010000002 DEXAMETHASONE PER 1 MG: Performed by: NURSE ANESTHETIST, CERTIFIED REGISTERED

## 2021-01-28 PROCEDURE — 88307 TISSUE EXAM BY PATHOLOGIST: CPT | Performed by: UROLOGY

## 2021-01-28 PROCEDURE — 25010000002 ONDANSETRON PER 1 MG: Performed by: NURSE ANESTHETIST, CERTIFIED REGISTERED

## 2021-01-28 PROCEDURE — 80048 BASIC METABOLIC PNL TOTAL CA: CPT | Performed by: UROLOGY

## 2021-01-28 PROCEDURE — 85027 COMPLETE CBC AUTOMATED: CPT | Performed by: UROLOGY

## 2021-01-28 PROCEDURE — 25010000002 PHENYLEPHRINE PER 1 ML: Performed by: NURSE ANESTHETIST, CERTIFIED REGISTERED

## 2021-01-28 PROCEDURE — 25010000002 FENTANYL CITRATE (PF) 100 MCG/2ML SOLUTION: Performed by: NURSE ANESTHETIST, CERTIFIED REGISTERED

## 2021-01-28 PROCEDURE — 88342 IMHCHEM/IMCYTCHM 1ST ANTB: CPT | Performed by: UROLOGY

## 2021-01-28 PROCEDURE — 25010000002 KETOROLAC TROMETHAMINE PER 15 MG: Performed by: UROLOGY

## 2021-01-28 PROCEDURE — 25010000002 HYDROMORPHONE PER 4 MG: Performed by: NURSE ANESTHETIST, CERTIFIED REGISTERED

## 2021-01-28 PROCEDURE — P9041 ALBUMIN (HUMAN),5%, 50ML: HCPCS | Performed by: NURSE ANESTHETIST, CERTIFIED REGISTERED

## 2021-01-28 PROCEDURE — 88309 TISSUE EXAM BY PATHOLOGIST: CPT | Performed by: UROLOGY

## 2021-01-28 PROCEDURE — 25010000002 NEOSTIGMINE PER 0.5 MG: Performed by: NURSE ANESTHETIST, CERTIFIED REGISTERED

## 2021-01-28 DEVICE — HEMOLOK L 6 CLIPS/CART
Type: IMPLANTABLE DEVICE | Site: ABDOMEN | Status: FUNCTIONAL
Brand: WECK

## 2021-01-28 DEVICE — ABSORBABLE WOUND CLOSURE DEVICE
Type: IMPLANTABLE DEVICE | Site: ABDOMEN | Status: FUNCTIONAL
Brand: V-LOC 90

## 2021-01-28 RX ORDER — OXYCODONE AND ACETAMINOPHEN 7.5; 325 MG/1; MG/1
1 TABLET ORAL ONCE AS NEEDED
Status: DISCONTINUED | OUTPATIENT
Start: 2021-01-28 | End: 2021-01-28

## 2021-01-28 RX ORDER — HYDROMORPHONE HCL 110MG/55ML
PATIENT CONTROLLED ANALGESIA SYRINGE INTRAVENOUS AS NEEDED
Status: DISCONTINUED | OUTPATIENT
Start: 2021-01-28 | End: 2021-01-28 | Stop reason: SURG

## 2021-01-28 RX ORDER — SODIUM CHLORIDE 0.9 % (FLUSH) 0.9 %
3 SYRINGE (ML) INJECTION EVERY 12 HOURS SCHEDULED
Status: DISCONTINUED | OUTPATIENT
Start: 2021-01-28 | End: 2021-01-28 | Stop reason: HOSPADM

## 2021-01-28 RX ORDER — LIDOCAINE HYDROCHLORIDE 10 MG/ML
INJECTION, SOLUTION EPIDURAL; INFILTRATION; INTRACAUDAL; PERINEURAL AS NEEDED
Status: DISCONTINUED | OUTPATIENT
Start: 2021-01-28 | End: 2021-01-28 | Stop reason: HOSPADM

## 2021-01-28 RX ORDER — ONDANSETRON 2 MG/ML
4 INJECTION INTRAMUSCULAR; INTRAVENOUS ONCE AS NEEDED
Status: DISCONTINUED | OUTPATIENT
Start: 2021-01-28 | End: 2021-01-28

## 2021-01-28 RX ORDER — ONDANSETRON 2 MG/ML
4 INJECTION INTRAMUSCULAR; INTRAVENOUS EVERY 6 HOURS PRN
Status: DISCONTINUED | OUTPATIENT
Start: 2021-01-28 | End: 2021-01-29 | Stop reason: HOSPADM

## 2021-01-28 RX ORDER — MIDAZOLAM HYDROCHLORIDE 1 MG/ML
1 INJECTION INTRAMUSCULAR; INTRAVENOUS
Status: DISCONTINUED | OUTPATIENT
Start: 2021-01-28 | End: 2021-01-28 | Stop reason: HOSPADM

## 2021-01-28 RX ORDER — ROSUVASTATIN CALCIUM 10 MG/1
10 TABLET, COATED ORAL NIGHTLY
Status: DISCONTINUED | OUTPATIENT
Start: 2021-01-28 | End: 2021-01-29 | Stop reason: HOSPADM

## 2021-01-28 RX ORDER — NALOXONE HCL 0.4 MG/ML
0.2 VIAL (ML) INJECTION AS NEEDED
Status: DISCONTINUED | OUTPATIENT
Start: 2021-01-28 | End: 2021-01-28

## 2021-01-28 RX ORDER — PROPOFOL 10 MG/ML
VIAL (ML) INTRAVENOUS AS NEEDED
Status: DISCONTINUED | OUTPATIENT
Start: 2021-01-28 | End: 2021-01-28 | Stop reason: SURG

## 2021-01-28 RX ORDER — CEFAZOLIN SODIUM 2 G/100ML
2 INJECTION, SOLUTION INTRAVENOUS ONCE
Status: COMPLETED | OUTPATIENT
Start: 2021-01-28 | End: 2021-01-28

## 2021-01-28 RX ORDER — ALBUMIN, HUMAN INJ 5% 5 %
SOLUTION INTRAVENOUS CONTINUOUS PRN
Status: DISCONTINUED | OUTPATIENT
Start: 2021-01-28 | End: 2021-01-28 | Stop reason: SURG

## 2021-01-28 RX ORDER — SODIUM CHLORIDE 0.9 % (FLUSH) 0.9 %
3-10 SYRINGE (ML) INJECTION AS NEEDED
Status: DISCONTINUED | OUTPATIENT
Start: 2021-01-28 | End: 2021-01-28 | Stop reason: HOSPADM

## 2021-01-28 RX ORDER — SODIUM CHLORIDE 9 MG/ML
100 INJECTION, SOLUTION INTRAVENOUS CONTINUOUS
Status: DISCONTINUED | OUTPATIENT
Start: 2021-01-28 | End: 2021-01-29 | Stop reason: HOSPADM

## 2021-01-28 RX ORDER — HYDROMORPHONE HYDROCHLORIDE 1 MG/ML
0.5 INJECTION, SOLUTION INTRAMUSCULAR; INTRAVENOUS; SUBCUTANEOUS
Status: DISCONTINUED | OUTPATIENT
Start: 2021-01-28 | End: 2021-01-28

## 2021-01-28 RX ORDER — LIDOCAINE HYDROCHLORIDE 20 MG/ML
INJECTION, SOLUTION INFILTRATION; PERINEURAL AS NEEDED
Status: DISCONTINUED | OUTPATIENT
Start: 2021-01-28 | End: 2021-01-28 | Stop reason: SURG

## 2021-01-28 RX ORDER — FLUMAZENIL 0.1 MG/ML
0.2 INJECTION INTRAVENOUS AS NEEDED
Status: DISCONTINUED | OUTPATIENT
Start: 2021-01-28 | End: 2021-01-28

## 2021-01-28 RX ORDER — OXYCODONE HYDROCHLORIDE AND ACETAMINOPHEN 5; 325 MG/1; MG/1
1 TABLET ORAL EVERY 4 HOURS PRN
Status: DISCONTINUED | OUTPATIENT
Start: 2021-01-28 | End: 2021-01-29 | Stop reason: HOSPADM

## 2021-01-28 RX ORDER — ONDANSETRON 4 MG/1
4 TABLET, FILM COATED ORAL EVERY 6 HOURS PRN
Status: DISCONTINUED | OUTPATIENT
Start: 2021-01-28 | End: 2021-01-29 | Stop reason: HOSPADM

## 2021-01-28 RX ORDER — FENTANYL CITRATE 50 UG/ML
50 INJECTION, SOLUTION INTRAMUSCULAR; INTRAVENOUS
Status: DISCONTINUED | OUTPATIENT
Start: 2021-01-28 | End: 2021-01-28 | Stop reason: HOSPADM

## 2021-01-28 RX ORDER — GLYCOPYRROLATE 0.2 MG/ML
INJECTION INTRAMUSCULAR; INTRAVENOUS AS NEEDED
Status: DISCONTINUED | OUTPATIENT
Start: 2021-01-28 | End: 2021-01-28 | Stop reason: SURG

## 2021-01-28 RX ORDER — ROCURONIUM BROMIDE 10 MG/ML
INJECTION, SOLUTION INTRAVENOUS AS NEEDED
Status: DISCONTINUED | OUTPATIENT
Start: 2021-01-28 | End: 2021-01-28 | Stop reason: SURG

## 2021-01-28 RX ORDER — MAGNESIUM HYDROXIDE 1200 MG/15ML
LIQUID ORAL AS NEEDED
Status: DISCONTINUED | OUTPATIENT
Start: 2021-01-28 | End: 2021-01-28 | Stop reason: HOSPADM

## 2021-01-28 RX ORDER — AMOXICILLIN 250 MG
2 CAPSULE ORAL 2 TIMES DAILY
Status: DISCONTINUED | OUTPATIENT
Start: 2021-01-28 | End: 2021-01-29 | Stop reason: HOSPADM

## 2021-01-28 RX ORDER — BISACODYL 10 MG
10 SUPPOSITORY, RECTAL RECTAL DAILY
Status: DISCONTINUED | OUTPATIENT
Start: 2021-01-28 | End: 2021-01-29 | Stop reason: HOSPADM

## 2021-01-28 RX ORDER — PROMETHAZINE HYDROCHLORIDE 25 MG/1
25 SUPPOSITORY RECTAL ONCE AS NEEDED
Status: DISCONTINUED | OUTPATIENT
Start: 2021-01-28 | End: 2021-01-28

## 2021-01-28 RX ORDER — KETOROLAC TROMETHAMINE 30 MG/ML
15 INJECTION, SOLUTION INTRAMUSCULAR; INTRAVENOUS EVERY 6 HOURS
Status: COMPLETED | OUTPATIENT
Start: 2021-01-28 | End: 2021-01-29

## 2021-01-28 RX ORDER — MIDAZOLAM HYDROCHLORIDE 1 MG/ML
0.5 INJECTION INTRAMUSCULAR; INTRAVENOUS
Status: DISCONTINUED | OUTPATIENT
Start: 2021-01-28 | End: 2021-01-28 | Stop reason: HOSPADM

## 2021-01-28 RX ORDER — SODIUM CHLORIDE, SODIUM LACTATE, POTASSIUM CHLORIDE, CALCIUM CHLORIDE 600; 310; 30; 20 MG/100ML; MG/100ML; MG/100ML; MG/100ML
9 INJECTION, SOLUTION INTRAVENOUS CONTINUOUS
Status: DISCONTINUED | OUTPATIENT
Start: 2021-01-28 | End: 2021-01-28 | Stop reason: HOSPADM

## 2021-01-28 RX ORDER — LISINOPRIL 10 MG/1
10 TABLET ORAL
Status: DISCONTINUED | OUTPATIENT
Start: 2021-01-29 | End: 2021-01-29 | Stop reason: HOSPADM

## 2021-01-28 RX ORDER — LABETALOL HYDROCHLORIDE 5 MG/ML
5 INJECTION, SOLUTION INTRAVENOUS
Status: DISCONTINUED | OUTPATIENT
Start: 2021-01-28 | End: 2021-01-28

## 2021-01-28 RX ORDER — LIDOCAINE HYDROCHLORIDE 10 MG/ML
0.5 INJECTION, SOLUTION EPIDURAL; INFILTRATION; INTRACAUDAL; PERINEURAL ONCE AS NEEDED
Status: DISCONTINUED | OUTPATIENT
Start: 2021-01-28 | End: 2021-01-28 | Stop reason: HOSPADM

## 2021-01-28 RX ORDER — DIPHENHYDRAMINE HCL 25 MG
25 CAPSULE ORAL
Status: DISCONTINUED | OUTPATIENT
Start: 2021-01-28 | End: 2021-01-28

## 2021-01-28 RX ORDER — EPHEDRINE SULFATE 50 MG/ML
5 INJECTION, SOLUTION INTRAVENOUS ONCE AS NEEDED
Status: DISCONTINUED | OUTPATIENT
Start: 2021-01-28 | End: 2021-01-28

## 2021-01-28 RX ORDER — LIDOCAINE HYDROCHLORIDE 40 MG/ML
SOLUTION TOPICAL AS NEEDED
Status: DISCONTINUED | OUTPATIENT
Start: 2021-01-28 | End: 2021-01-28 | Stop reason: SURG

## 2021-01-28 RX ORDER — EPHEDRINE SULFATE 50 MG/ML
INJECTION, SOLUTION INTRAVENOUS AS NEEDED
Status: DISCONTINUED | OUTPATIENT
Start: 2021-01-28 | End: 2021-01-28 | Stop reason: SURG

## 2021-01-28 RX ORDER — PROMETHAZINE HYDROCHLORIDE 25 MG/1
25 TABLET ORAL ONCE AS NEEDED
Status: DISCONTINUED | OUTPATIENT
Start: 2021-01-28 | End: 2021-01-28

## 2021-01-28 RX ORDER — HYDRALAZINE HYDROCHLORIDE 20 MG/ML
5 INJECTION INTRAMUSCULAR; INTRAVENOUS
Status: DISCONTINUED | OUTPATIENT
Start: 2021-01-28 | End: 2021-01-28

## 2021-01-28 RX ORDER — DEXAMETHASONE SODIUM PHOSPHATE 10 MG/ML
INJECTION INTRAMUSCULAR; INTRAVENOUS AS NEEDED
Status: DISCONTINUED | OUTPATIENT
Start: 2021-01-28 | End: 2021-01-28 | Stop reason: SURG

## 2021-01-28 RX ORDER — DIPHENHYDRAMINE HYDROCHLORIDE 50 MG/ML
12.5 INJECTION INTRAMUSCULAR; INTRAVENOUS
Status: DISCONTINUED | OUTPATIENT
Start: 2021-01-28 | End: 2021-01-28

## 2021-01-28 RX ORDER — HYDROMORPHONE HYDROCHLORIDE 1 MG/ML
0.5 INJECTION, SOLUTION INTRAMUSCULAR; INTRAVENOUS; SUBCUTANEOUS
Status: DISCONTINUED | OUTPATIENT
Start: 2021-01-28 | End: 2021-01-29 | Stop reason: HOSPADM

## 2021-01-28 RX ORDER — ONDANSETRON 2 MG/ML
INJECTION INTRAMUSCULAR; INTRAVENOUS AS NEEDED
Status: DISCONTINUED | OUTPATIENT
Start: 2021-01-28 | End: 2021-01-28 | Stop reason: SURG

## 2021-01-28 RX ORDER — HYDROCODONE BITARTRATE AND ACETAMINOPHEN 7.5; 325 MG/1; MG/1
1 TABLET ORAL ONCE AS NEEDED
Status: DISCONTINUED | OUTPATIENT
Start: 2021-01-28 | End: 2021-01-28

## 2021-01-28 RX ORDER — FAMOTIDINE 10 MG/ML
20 INJECTION, SOLUTION INTRAVENOUS ONCE
Status: COMPLETED | OUTPATIENT
Start: 2021-01-28 | End: 2021-01-28

## 2021-01-28 RX ORDER — NALOXONE HCL 0.4 MG/ML
0.1 VIAL (ML) INJECTION
Status: DISCONTINUED | OUTPATIENT
Start: 2021-01-28 | End: 2021-01-29 | Stop reason: HOSPADM

## 2021-01-28 RX ORDER — FENTANYL CITRATE 50 UG/ML
50 INJECTION, SOLUTION INTRAMUSCULAR; INTRAVENOUS
Status: DISCONTINUED | OUTPATIENT
Start: 2021-01-28 | End: 2021-01-28

## 2021-01-28 RX ADMIN — KETOROLAC TROMETHAMINE 15 MG: 30 INJECTION, SOLUTION INTRAMUSCULAR at 20:59

## 2021-01-28 RX ADMIN — DOCUSATE SODIUM 50MG AND SENNOSIDES 8.6MG 2 TABLET: 8.6; 5 TABLET, FILM COATED ORAL at 21:00

## 2021-01-28 RX ADMIN — ROCURONIUM BROMIDE 10 MG: 10 INJECTION INTRAVENOUS at 11:00

## 2021-01-28 RX ADMIN — EPHEDRINE SULFATE 5 MG: 50 INJECTION INTRAVENOUS at 09:29

## 2021-01-28 RX ADMIN — FENTANYL CITRATE 50 MCG: 50 INJECTION, SOLUTION INTRAMUSCULAR; INTRAVENOUS at 13:43

## 2021-01-28 RX ADMIN — ROSUVASTATIN CALCIUM 10 MG: 10 TABLET, FILM COATED ORAL at 21:02

## 2021-01-28 RX ADMIN — CEFAZOLIN SODIUM 2 G: 2 INJECTION, SOLUTION INTRAVENOUS at 07:40

## 2021-01-28 RX ADMIN — LIDOCAINE HYDROCHLORIDE 100 MG: 20 INJECTION, SOLUTION INFILTRATION; PERINEURAL at 07:34

## 2021-01-28 RX ADMIN — EPHEDRINE SULFATE 5 MG: 50 INJECTION INTRAVENOUS at 09:02

## 2021-01-28 RX ADMIN — LIDOCAINE HYDROCHLORIDE 1 EACH: 40 SOLUTION TOPICAL at 07:38

## 2021-01-28 RX ADMIN — PROPOFOL 150 MG: 10 INJECTION, EMULSION INTRAVENOUS at 07:34

## 2021-01-28 RX ADMIN — LABETALOL HYDROCHLORIDE 5 MG: 5 INJECTION, SOLUTION INTRAVENOUS at 13:59

## 2021-01-28 RX ADMIN — FENTANYL CITRATE 50 MCG: 50 INJECTION INTRAMUSCULAR; INTRAVENOUS at 10:34

## 2021-01-28 RX ADMIN — ALBUMIN HUMAN: 0.05 INJECTION, SOLUTION INTRAVENOUS at 10:29

## 2021-01-28 RX ADMIN — HYDROMORPHONE HYDROCHLORIDE 0.5 MG: 2 INJECTION, SOLUTION INTRAMUSCULAR; INTRAVENOUS; SUBCUTANEOUS at 12:09

## 2021-01-28 RX ADMIN — NEOSTIGMINE METHYLSULFATE 3 MG: 1 INJECTION INTRAMUSCULAR; INTRAVENOUS; SUBCUTANEOUS at 11:45

## 2021-01-28 RX ADMIN — ROCURONIUM BROMIDE 10 MG: 10 INJECTION INTRAVENOUS at 10:32

## 2021-01-28 RX ADMIN — FAMOTIDINE 20 MG: 10 INJECTION INTRAVENOUS at 06:33

## 2021-01-28 RX ADMIN — PHENYLEPHRINE HYDROCHLORIDE 100 MCG: 10 INJECTION INTRAVENOUS at 10:20

## 2021-01-28 RX ADMIN — BISACODYL 10 MG: 10 SUPPOSITORY RECTAL at 17:59

## 2021-01-28 RX ADMIN — FENTANYL CITRATE 50 MCG: 50 INJECTION, SOLUTION INTRAMUSCULAR; INTRAVENOUS at 12:30

## 2021-01-28 RX ADMIN — EPHEDRINE SULFATE 10 MG: 50 INJECTION INTRAVENOUS at 08:08

## 2021-01-28 RX ADMIN — HYDROMORPHONE HYDROCHLORIDE 0.5 MG: 1 INJECTION, SOLUTION INTRAMUSCULAR; INTRAVENOUS; SUBCUTANEOUS at 13:48

## 2021-01-28 RX ADMIN — HYDROMORPHONE HYDROCHLORIDE 0.5 MG: 1 INJECTION, SOLUTION INTRAMUSCULAR; INTRAVENOUS; SUBCUTANEOUS at 12:40

## 2021-01-28 RX ADMIN — ROCURONIUM BROMIDE 20 MG: 10 INJECTION INTRAVENOUS at 08:21

## 2021-01-28 RX ADMIN — FENTANYL CITRATE 50 MCG: 50 INJECTION INTRAMUSCULAR; INTRAVENOUS at 07:58

## 2021-01-28 RX ADMIN — CEFAZOLIN SODIUM 2 G: 2 INJECTION, SOLUTION INTRAVENOUS at 11:36

## 2021-01-28 RX ADMIN — ROCURONIUM BROMIDE 10 MG: 10 INJECTION INTRAVENOUS at 09:31

## 2021-01-28 RX ADMIN — FENTANYL CITRATE 50 MCG: 50 INJECTION INTRAMUSCULAR; INTRAVENOUS at 07:34

## 2021-01-28 RX ADMIN — HYDROMORPHONE HYDROCHLORIDE 0.5 MG: 2 INJECTION, SOLUTION INTRAMUSCULAR; INTRAVENOUS; SUBCUTANEOUS at 11:45

## 2021-01-28 RX ADMIN — SODIUM CHLORIDE, POTASSIUM CHLORIDE, SODIUM LACTATE AND CALCIUM CHLORIDE: 600; 310; 30; 20 INJECTION, SOLUTION INTRAVENOUS at 10:00

## 2021-01-28 RX ADMIN — ROCURONIUM BROMIDE 10 MG: 10 INJECTION INTRAVENOUS at 09:04

## 2021-01-28 RX ADMIN — ROCURONIUM BROMIDE 50 MG: 10 INJECTION INTRAVENOUS at 07:34

## 2021-01-28 RX ADMIN — DEXAMETHASONE SODIUM PHOSPHATE 8 MG: 10 INJECTION INTRAMUSCULAR; INTRAVENOUS at 07:44

## 2021-01-28 RX ADMIN — HYDROMORPHONE HYDROCHLORIDE 0.5 MG: 1 INJECTION, SOLUTION INTRAMUSCULAR; INTRAVENOUS; SUBCUTANEOUS at 13:32

## 2021-01-28 RX ADMIN — ONDANSETRON HYDROCHLORIDE 4 MG: 2 SOLUTION INTRAMUSCULAR; INTRAVENOUS at 11:42

## 2021-01-28 RX ADMIN — FENTANYL CITRATE 50 MCG: 50 INJECTION INTRAMUSCULAR; INTRAVENOUS at 08:34

## 2021-01-28 RX ADMIN — GLYCOPYRROLATE 0.6 MG: 0.2 INJECTION INTRAMUSCULAR; INTRAVENOUS at 11:45

## 2021-01-28 RX ADMIN — PROPOFOL 50 MG: 10 INJECTION, EMULSION INTRAVENOUS at 11:46

## 2021-01-28 RX ADMIN — SODIUM CHLORIDE, POTASSIUM CHLORIDE, SODIUM LACTATE AND CALCIUM CHLORIDE 9 ML/HR: 600; 310; 30; 20 INJECTION, SOLUTION INTRAVENOUS at 06:33

## 2021-01-28 RX ADMIN — FENTANYL CITRATE 50 MCG: 50 INJECTION, SOLUTION INTRAMUSCULAR; INTRAVENOUS at 13:16

## 2021-01-28 RX ADMIN — EPHEDRINE SULFATE 10 MG: 50 INJECTION INTRAVENOUS at 09:47

## 2021-01-28 RX ADMIN — KETOROLAC TROMETHAMINE 15 MG: 30 INJECTION, SOLUTION INTRAMUSCULAR at 13:37

## 2021-01-28 RX ADMIN — SODIUM CHLORIDE 100 ML/HR: 9 INJECTION, SOLUTION INTRAVENOUS at 16:00

## 2021-01-28 RX ADMIN — HYDROMORPHONE HYDROCHLORIDE 0.5 MG: 1 INJECTION, SOLUTION INTRAMUSCULAR; INTRAVENOUS; SUBCUTANEOUS at 13:02

## 2021-01-28 RX ADMIN — ROCURONIUM BROMIDE 10 MG: 10 INJECTION INTRAVENOUS at 08:02

## 2021-01-28 RX ADMIN — ROCURONIUM BROMIDE 10 MG: 10 INJECTION INTRAVENOUS at 10:02

## 2021-01-28 RX ADMIN — FENTANYL CITRATE 50 MCG: 50 INJECTION, SOLUTION INTRAMUSCULAR; INTRAVENOUS at 12:55

## 2021-01-28 NOTE — ANESTHESIA PROCEDURE NOTES
Airway  Urgency: elective    Date/Time: 1/28/2021 7:38 AM  Airway not difficult    General Information and Staff    Patient location during procedure: OR  Anesthesiologist: Home Mauricio MD  CRNA: Marge Pablo CRNA    Indications and Patient Condition  Indications for airway management: airway protection    Preoxygenated: yes  Mask difficulty assessment: 2 - vent by mask + OA or adjuvant +/- NMBA    Final Airway Details  Final airway type: endotracheal airway      Successful airway: ETT  Cuffed: yes   Successful intubation technique: direct laryngoscopy  Facilitating devices/methods: intubating stylet  Endotracheal tube insertion site: oral  Blade: Jemima  Blade size: 4  ETT size (mm): 8.0  Cormack-Lehane Classification: grade IIb - view of arytenoids or posterior of glottis only  Placement verified by: chest auscultation and capnometry   Cuff volume (mL): 7  Measured from: lips  ETT/EBT  to lips (cm): 23  Number of attempts at approach: 1  Assessment: lips, teeth, and gum same as pre-op and atraumatic intubation

## 2021-01-28 NOTE — ANESTHESIA PREPROCEDURE EVALUATION
Anesthesia Evaluation     Patient summary reviewed   no history of anesthetic complications:  NPO Solid Status: > 8 hours  NPO Liquid Status: > 2 hours           Airway   Mallampati: II  TM distance: >3 FB  Neck ROM: full  Dental          Pulmonary     breath sounds clear to auscultation  (+) a smoker Former,   (-) shortness of breath  Cardiovascular   Exercise tolerance: good (4-7 METS)    ECG reviewed  Rhythm: regular  Rate: normal    (+) hypertension, hyperlipidemia,   (-) angina, BURGER      Neuro/Psych  (+) numbness,     GI/Hepatic/Renal/Endo    (+) obesity,       Musculoskeletal     Abdominal    Substance History   (+) alcohol use,      OB/GYN          Other   arthritis,    history of cancer                    Anesthesia Plan    ASA 3     general     intravenous induction     Anesthetic plan, all risks, benefits, and alternatives have been provided, discussed and informed consent has been obtained with: patient.

## 2021-01-28 NOTE — ANESTHESIA POSTPROCEDURE EVALUATION
"Patient: Uriah Perez    Procedure Summary     Date: 01/28/21 Room / Location: [facility] OR  / [facility] MAIN OR    Anesthesia Start: 0724 Anesthesia Stop: 1217    Procedure: DAVINCI PROSTATECTOMY LAPAROSCOPIC WITH DILATERAL PELVIC LYMPH NODE DISSECTION (N/A Abdomen) Diagnosis:     Surgeon: Alberto Duarn Jr., MD Provider: Home Mauricio MD    Anesthesia Type: general ASA Status: 3          Anesthesia Type: general    Vitals  Vitals Value Taken Time   /99 01/28/21 1340   Temp 37.5 °C (99.5 °F) 01/28/21 1213   Pulse 85 01/28/21 1351   Resp 16 01/28/21 1340   SpO2 94 % 01/28/21 1351   Vitals shown include unvalidated device data.        Post Anesthesia Care and Evaluation    Patient location during evaluation: bedside  Patient participation: complete - patient participated  Level of consciousness: awake and alert  Pain management: adequate  Airway patency: patent  Anesthetic complications: No anesthetic complications    Cardiovascular status: acceptable  Respiratory status: acceptable  Hydration status: acceptable    Comments: /99 (BP Location: Right arm, Patient Position: Lying)   Pulse 100   Temp 37.5 °C (99.5 °F) (Oral)   Resp 16   Ht 172.7 cm (68\")   Wt 106 kg (233 lb 11 oz)   SpO2 92%   BMI 35.53 kg/m²       "

## 2021-01-29 VITALS
RESPIRATION RATE: 18 BRPM | DIASTOLIC BLOOD PRESSURE: 81 MMHG | OXYGEN SATURATION: 93 % | HEIGHT: 68 IN | BODY MASS INDEX: 35.42 KG/M2 | TEMPERATURE: 99.1 F | WEIGHT: 233.69 LBS | HEART RATE: 85 BPM | SYSTOLIC BLOOD PRESSURE: 134 MMHG

## 2021-01-29 LAB
ANION GAP SERPL CALCULATED.3IONS-SCNC: 6.6 MMOL/L (ref 5–15)
BUN SERPL-MCNC: 16 MG/DL (ref 8–23)
BUN/CREAT SERPL: 15.5 (ref 7–25)
CALCIUM SPEC-SCNC: 8.3 MG/DL (ref 8.6–10.5)
CHLORIDE SERPL-SCNC: 102 MMOL/L (ref 98–107)
CO2 SERPL-SCNC: 27.4 MMOL/L (ref 22–29)
CREAT SERPL-MCNC: 1.03 MG/DL (ref 0.76–1.27)
DEPRECATED RDW RBC AUTO: 41.6 FL (ref 37–54)
ERYTHROCYTE [DISTWIDTH] IN BLOOD BY AUTOMATED COUNT: 12.9 % (ref 12.3–15.4)
GFR SERPL CREATININE-BSD FRML MDRD: 72 ML/MIN/1.73
GLUCOSE SERPL-MCNC: 118 MG/DL (ref 65–99)
HCT VFR BLD AUTO: 34.4 % (ref 37.5–51)
HGB BLD-MCNC: 11.9 G/DL (ref 13–17.7)
MCH RBC QN AUTO: 30.9 PG (ref 26.6–33)
MCHC RBC AUTO-ENTMCNC: 34.6 G/DL (ref 31.5–35.7)
MCV RBC AUTO: 89.4 FL (ref 79–97)
PLATELET # BLD AUTO: 201 10*3/MM3 (ref 140–450)
PMV BLD AUTO: 10.8 FL (ref 6–12)
POTASSIUM SERPL-SCNC: 4.4 MMOL/L (ref 3.5–5.2)
RBC # BLD AUTO: 3.85 10*6/MM3 (ref 4.14–5.8)
SODIUM SERPL-SCNC: 136 MMOL/L (ref 136–145)
WBC # BLD AUTO: 14.09 10*3/MM3 (ref 3.4–10.8)

## 2021-01-29 PROCEDURE — 85027 COMPLETE CBC AUTOMATED: CPT | Performed by: UROLOGY

## 2021-01-29 PROCEDURE — 80048 BASIC METABOLIC PNL TOTAL CA: CPT | Performed by: UROLOGY

## 2021-01-29 PROCEDURE — 25010000002 KETOROLAC TROMETHAMINE PER 15 MG: Performed by: UROLOGY

## 2021-01-29 RX ORDER — OXYCODONE HYDROCHLORIDE AND ACETAMINOPHEN 5; 325 MG/1; MG/1
1-2 TABLET ORAL EVERY 6 HOURS PRN
Qty: 8 TABLET | Refills: 0 | Status: SHIPPED | OUTPATIENT
Start: 2021-01-29 | End: 2021-08-31

## 2021-01-29 RX ORDER — DOCUSATE SODIUM 250 MG
250 CAPSULE ORAL 2 TIMES DAILY PRN
Qty: 30 CAPSULE | Refills: 1 | Status: SHIPPED | OUTPATIENT
Start: 2021-01-29 | End: 2021-08-31

## 2021-01-29 RX ADMIN — KETOROLAC TROMETHAMINE 15 MG: 30 INJECTION, SOLUTION INTRAMUSCULAR at 09:15

## 2021-01-29 RX ADMIN — KETOROLAC TROMETHAMINE 15 MG: 30 INJECTION, SOLUTION INTRAMUSCULAR at 01:26

## 2021-01-29 RX ADMIN — SODIUM CHLORIDE 100 ML/HR: 9 INJECTION, SOLUTION INTRAVENOUS at 01:31

## 2021-02-18 LAB
LAB AP CASE REPORT: NORMAL
LAB AP DIAGNOSIS COMMENT: NORMAL
LAB AP SYNOPTIC CHECKLIST: NORMAL
PATH REPORT.ADDENDUM SPEC: NORMAL
PATH REPORT.FINAL DX SPEC: NORMAL
PATH REPORT.GROSS SPEC: NORMAL

## 2021-03-16 ENCOUNTER — BULK ORDERING (OUTPATIENT)
Dept: CASE MANAGEMENT | Facility: OTHER | Age: 71
End: 2021-03-16

## 2021-03-16 DIAGNOSIS — Z23 IMMUNIZATION DUE: ICD-10-CM

## 2021-05-13 ENCOUNTER — HOSPITAL ENCOUNTER (OUTPATIENT)
Dept: LAB | Facility: HOSPITAL | Age: 71
Discharge: HOME | End: 2021-05-13
Attending: INTERNAL MEDICINE

## 2021-05-13 LAB
ALBUMIN SERPL-MCNC: 4.1 G/DL (ref 3.5–5)
ALBUMIN/GLOB SERPL: 1.4 {RATIO} (ref 1.4–2.6)
ALP SERPL-CCNC: 71 U/L (ref 56–155)
ALT SERPL-CCNC: 30 U/L (ref 10–40)
ANION GAP SERPL CALC-SCNC: 16 MMOL/L (ref 8–19)
AST SERPL-CCNC: 22 U/L (ref 15–50)
BASOPHILS # BLD AUTO: 0.09 10*3/UL (ref 0–0.2)
BASOPHILS NFR BLD AUTO: 0.8 % (ref 0–3)
BILIRUB SERPL-MCNC: 0.97 MG/DL (ref 0.2–1.3)
BUN SERPL-MCNC: 19 MG/DL (ref 5–25)
BUN/CREAT SERPL: 18 {RATIO} (ref 6–20)
CALCIUM SERPL-MCNC: 9.2 MG/DL (ref 8.7–10.4)
CHLORIDE SERPL-SCNC: 105 MMOL/L (ref 99–111)
CHOLEST SERPL-MCNC: 165 MG/DL (ref 107–200)
CHOLEST/HDLC SERPL: 3.2 {RATIO} (ref 3–6)
CONV ABS IMM GRAN: 0.04 10*3/UL (ref 0–0.2)
CONV CO2: 26 MMOL/L (ref 22–32)
CONV IMMATURE GRAN: 0.4 % (ref 0–1.8)
CONV TOTAL PROTEIN: 7 G/DL (ref 6.3–8.2)
CREAT UR-MCNC: 1.07 MG/DL (ref 0.7–1.2)
DEPRECATED RDW RBC AUTO: 47.1 FL (ref 35.1–43.9)
EOSINOPHIL # BLD AUTO: 0.33 10*3/UL (ref 0–0.7)
EOSINOPHIL # BLD AUTO: 2.9 % (ref 0–7)
ERYTHROCYTE [DISTWIDTH] IN BLOOD BY AUTOMATED COUNT: 14.4 % (ref 11.6–14.4)
EST. AVERAGE GLUCOSE BLD GHB EST-MCNC: 128 MG/DL
GFR SERPLBLD BASED ON 1.73 SQ M-ARVRAT: >60 ML/MIN/{1.73_M2}
GLOBULIN UR ELPH-MCNC: 2.9 G/DL (ref 2–3.5)
GLUCOSE SERPL-MCNC: 121 MG/DL (ref 70–99)
HBA1C MFR BLD: 6.1 % (ref 3.5–5.7)
HCT VFR BLD AUTO: 43.8 % (ref 42–52)
HDLC SERPL-MCNC: 51 MG/DL (ref 40–60)
HGB BLD-MCNC: 13.6 G/DL (ref 14–18)
LDLC SERPL CALC-MCNC: 98 MG/DL (ref 70–100)
LYMPHOCYTES # BLD AUTO: 3.23 10*3/UL (ref 1–5)
LYMPHOCYTES NFR BLD AUTO: 28.6 % (ref 20–45)
MCH RBC QN AUTO: 27.8 PG (ref 27–31)
MCHC RBC AUTO-ENTMCNC: 31.1 G/DL (ref 33–37)
MCV RBC AUTO: 89.6 FL (ref 80–96)
MONOCYTES # BLD AUTO: 0.71 10*3/UL (ref 0.2–1.2)
MONOCYTES NFR BLD AUTO: 6.3 % (ref 3–10)
NEUTROPHILS # BLD AUTO: 6.91 10*3/UL (ref 2–8)
NEUTROPHILS NFR BLD AUTO: 61 % (ref 30–85)
NRBC CBCN: 0 % (ref 0–0.7)
OSMOLALITY SERPL CALC.SUM OF ELEC: 298 MOSM/KG (ref 273–304)
PLATELET # BLD AUTO: 290 10*3/UL (ref 130–400)
PMV BLD AUTO: 11.7 FL (ref 9.4–12.4)
POTASSIUM SERPL-SCNC: 4.6 MMOL/L (ref 3.5–5.3)
RBC # BLD AUTO: 4.89 10*6/UL (ref 4.7–6.1)
SODIUM SERPL-SCNC: 142 MMOL/L (ref 135–147)
TRIGL SERPL-MCNC: 80 MG/DL (ref 40–150)
VLDLC SERPL-MCNC: 16 MG/DL (ref 5–37)
WBC # BLD AUTO: 11.31 10*3/UL (ref 4.8–10.8)

## 2021-08-31 ENCOUNTER — PRE-ADMISSION TESTING (OUTPATIENT)
Dept: PREADMISSION TESTING | Facility: HOSPITAL | Age: 71
End: 2021-08-31

## 2021-08-31 VITALS
SYSTOLIC BLOOD PRESSURE: 139 MMHG | WEIGHT: 244 LBS | BODY MASS INDEX: 36.98 KG/M2 | RESPIRATION RATE: 16 BRPM | HEART RATE: 67 BPM | OXYGEN SATURATION: 97 % | DIASTOLIC BLOOD PRESSURE: 80 MMHG | TEMPERATURE: 98.7 F | HEIGHT: 68 IN

## 2021-08-31 LAB
ANION GAP SERPL CALCULATED.3IONS-SCNC: 11.1 MMOL/L (ref 5–15)
BUN SERPL-MCNC: 17 MG/DL (ref 8–23)
BUN/CREAT SERPL: 17.3 (ref 7–25)
CALCIUM SPEC-SCNC: 9.3 MG/DL (ref 8.6–10.5)
CHLORIDE SERPL-SCNC: 107 MMOL/L (ref 98–107)
CO2 SERPL-SCNC: 24.9 MMOL/L (ref 22–29)
CREAT SERPL-MCNC: 0.98 MG/DL (ref 0.76–1.27)
DEPRECATED RDW RBC AUTO: 46 FL (ref 37–54)
ERYTHROCYTE [DISTWIDTH] IN BLOOD BY AUTOMATED COUNT: 13.5 % (ref 12.3–15.4)
GFR SERPL CREATININE-BSD FRML MDRD: 76 ML/MIN/1.73
GLUCOSE SERPL-MCNC: 100 MG/DL (ref 65–99)
HCT VFR BLD AUTO: 45.4 % (ref 37.5–51)
HGB BLD-MCNC: 14.4 G/DL (ref 13–17.7)
MCH RBC QN AUTO: 29.1 PG (ref 26.6–33)
MCHC RBC AUTO-ENTMCNC: 31.7 G/DL (ref 31.5–35.7)
MCV RBC AUTO: 91.9 FL (ref 79–97)
PLATELET # BLD AUTO: 220 10*3/MM3 (ref 140–450)
PMV BLD AUTO: 11.1 FL (ref 6–12)
POTASSIUM SERPL-SCNC: 4.7 MMOL/L (ref 3.5–5.2)
QT INTERVAL: 404 MS
RBC # BLD AUTO: 4.94 10*6/MM3 (ref 4.14–5.8)
SARS-COV-2 ORF1AB RESP QL NAA+PROBE: NOT DETECTED
SODIUM SERPL-SCNC: 143 MMOL/L (ref 136–145)
WBC # BLD AUTO: 9.33 10*3/MM3 (ref 3.4–10.8)

## 2021-08-31 PROCEDURE — C9803 HOPD COVID-19 SPEC COLLECT: HCPCS

## 2021-08-31 PROCEDURE — 93010 ELECTROCARDIOGRAM REPORT: CPT | Performed by: INTERNAL MEDICINE

## 2021-08-31 PROCEDURE — U0004 COV-19 TEST NON-CDC HGH THRU: HCPCS

## 2021-08-31 PROCEDURE — 36415 COLL VENOUS BLD VENIPUNCTURE: CPT

## 2021-08-31 PROCEDURE — 80048 BASIC METABOLIC PNL TOTAL CA: CPT

## 2021-08-31 PROCEDURE — 93005 ELECTROCARDIOGRAM TRACING: CPT

## 2021-08-31 PROCEDURE — 85027 COMPLETE CBC AUTOMATED: CPT

## 2021-08-31 RX ORDER — CHLORAL HYDRATE 500 MG
1000 CAPSULE ORAL
COMMUNITY
End: 2021-09-02 | Stop reason: HOSPADM

## 2021-09-02 ENCOUNTER — ANESTHESIA (OUTPATIENT)
Dept: PERIOP | Facility: HOSPITAL | Age: 71
End: 2021-09-02

## 2021-09-02 ENCOUNTER — ANESTHESIA EVENT (OUTPATIENT)
Dept: PERIOP | Facility: HOSPITAL | Age: 71
End: 2021-09-02

## 2021-09-02 ENCOUNTER — HOSPITAL ENCOUNTER (OUTPATIENT)
Facility: HOSPITAL | Age: 71
Setting detail: HOSPITAL OUTPATIENT SURGERY
Discharge: HOME | End: 2021-09-02
Attending: UROLOGY | Admitting: UROLOGY

## 2021-09-02 VITALS
OXYGEN SATURATION: 95 % | TEMPERATURE: 97.8 F | HEIGHT: 68 IN | WEIGHT: 242.73 LBS | BODY MASS INDEX: 36.79 KG/M2 | HEART RATE: 59 BPM | DIASTOLIC BLOOD PRESSURE: 88 MMHG | RESPIRATION RATE: 18 BRPM | SYSTOLIC BLOOD PRESSURE: 157 MMHG

## 2021-09-02 DIAGNOSIS — N32.0 BLADDER NECK CONTRACTURE: Primary | ICD-10-CM

## 2021-09-02 LAB — GLUCOSE BLDC GLUCOMTR-MCNC: 89 MG/DL (ref 70–130)

## 2021-09-02 PROCEDURE — 25010000002 PROPOFOL 10 MG/ML EMULSION: Performed by: NURSE ANESTHETIST, CERTIFIED REGISTERED

## 2021-09-02 PROCEDURE — 25010000002 ONDANSETRON PER 1 MG: Performed by: NURSE ANESTHETIST, CERTIFIED REGISTERED

## 2021-09-02 PROCEDURE — 25010000002 DEXAMETHASONE PER 1 MG: Performed by: NURSE ANESTHETIST, CERTIFIED REGISTERED

## 2021-09-02 PROCEDURE — 25010000003 CEFAZOLIN IN DEXTROSE 2-4 GM/100ML-% SOLUTION: Performed by: UROLOGY

## 2021-09-02 PROCEDURE — 82962 GLUCOSE BLOOD TEST: CPT

## 2021-09-02 PROCEDURE — 25010000002 FENTANYL CITRATE (PF) 50 MCG/ML SOLUTION: Performed by: STUDENT IN AN ORGANIZED HEALTH CARE EDUCATION/TRAINING PROGRAM

## 2021-09-02 RX ORDER — PROPOFOL 10 MG/ML
VIAL (ML) INTRAVENOUS AS NEEDED
Status: DISCONTINUED | OUTPATIENT
Start: 2021-09-02 | End: 2021-09-02 | Stop reason: SURG

## 2021-09-02 RX ORDER — FLUMAZENIL 0.1 MG/ML
0.2 INJECTION INTRAVENOUS AS NEEDED
Status: DISCONTINUED | OUTPATIENT
Start: 2021-09-02 | End: 2021-09-02 | Stop reason: HOSPADM

## 2021-09-02 RX ORDER — MULTIPLE VITAMINS W/ MINERALS TAB 9MG-400MCG
1 TAB ORAL DAILY
COMMUNITY
End: 2021-09-02 | Stop reason: HOSPADM

## 2021-09-02 RX ORDER — EPHEDRINE SULFATE 50 MG/ML
5 INJECTION, SOLUTION INTRAVENOUS ONCE AS NEEDED
Status: DISCONTINUED | OUTPATIENT
Start: 2021-09-02 | End: 2021-09-02 | Stop reason: HOSPADM

## 2021-09-02 RX ORDER — DIPHENHYDRAMINE HCL 25 MG
25 CAPSULE ORAL
Status: DISCONTINUED | OUTPATIENT
Start: 2021-09-02 | End: 2021-09-02 | Stop reason: HOSPADM

## 2021-09-02 RX ORDER — NALOXONE HCL 0.4 MG/ML
0.2 VIAL (ML) INJECTION AS NEEDED
Status: DISCONTINUED | OUTPATIENT
Start: 2021-09-02 | End: 2021-09-02 | Stop reason: HOSPADM

## 2021-09-02 RX ORDER — PROMETHAZINE HYDROCHLORIDE 25 MG/1
25 SUPPOSITORY RECTAL ONCE AS NEEDED
Status: DISCONTINUED | OUTPATIENT
Start: 2021-09-02 | End: 2021-09-02 | Stop reason: HOSPADM

## 2021-09-02 RX ORDER — DIPHENHYDRAMINE HYDROCHLORIDE 50 MG/ML
12.5 INJECTION INTRAMUSCULAR; INTRAVENOUS
Status: DISCONTINUED | OUTPATIENT
Start: 2021-09-02 | End: 2021-09-02 | Stop reason: HOSPADM

## 2021-09-02 RX ORDER — ONDANSETRON 2 MG/ML
4 INJECTION INTRAMUSCULAR; INTRAVENOUS ONCE AS NEEDED
Status: DISCONTINUED | OUTPATIENT
Start: 2021-09-02 | End: 2021-09-02 | Stop reason: HOSPADM

## 2021-09-02 RX ORDER — FENTANYL CITRATE 50 UG/ML
50 INJECTION, SOLUTION INTRAMUSCULAR; INTRAVENOUS
Status: DISCONTINUED | OUTPATIENT
Start: 2021-09-02 | End: 2021-09-02 | Stop reason: HOSPADM

## 2021-09-02 RX ORDER — ONDANSETRON 2 MG/ML
INJECTION INTRAMUSCULAR; INTRAVENOUS AS NEEDED
Status: DISCONTINUED | OUTPATIENT
Start: 2021-09-02 | End: 2021-09-02 | Stop reason: SURG

## 2021-09-02 RX ORDER — CEFAZOLIN SODIUM 2 G/100ML
2 INJECTION, SOLUTION INTRAVENOUS ONCE
Status: COMPLETED | OUTPATIENT
Start: 2021-09-02 | End: 2021-09-02

## 2021-09-02 RX ORDER — SULFAMETHOXAZOLE AND TRIMETHOPRIM 800; 160 MG/1; MG/1
1 TABLET ORAL 2 TIMES DAILY
Qty: 10 TABLET | Refills: 0 | Status: SHIPPED | OUTPATIENT
Start: 2021-09-02 | End: 2022-05-10

## 2021-09-02 RX ORDER — SODIUM CHLORIDE 0.9 % (FLUSH) 0.9 %
3 SYRINGE (ML) INJECTION EVERY 12 HOURS SCHEDULED
Status: DISCONTINUED | OUTPATIENT
Start: 2021-09-02 | End: 2021-09-02 | Stop reason: HOSPADM

## 2021-09-02 RX ORDER — LIDOCAINE HYDROCHLORIDE 20 MG/ML
INJECTION, SOLUTION INFILTRATION; PERINEURAL AS NEEDED
Status: DISCONTINUED | OUTPATIENT
Start: 2021-09-02 | End: 2021-09-02 | Stop reason: SURG

## 2021-09-02 RX ORDER — MIDAZOLAM HYDROCHLORIDE 1 MG/ML
0.5 INJECTION INTRAMUSCULAR; INTRAVENOUS
Status: DISCONTINUED | OUTPATIENT
Start: 2021-09-02 | End: 2021-09-02 | Stop reason: HOSPADM

## 2021-09-02 RX ORDER — OXYCODONE AND ACETAMINOPHEN 10; 325 MG/1; MG/1
1 TABLET ORAL EVERY 4 HOURS PRN
Status: DISCONTINUED | OUTPATIENT
Start: 2021-09-02 | End: 2021-09-02 | Stop reason: HOSPADM

## 2021-09-02 RX ORDER — LABETALOL HYDROCHLORIDE 5 MG/ML
5 INJECTION, SOLUTION INTRAVENOUS
Status: DISCONTINUED | OUTPATIENT
Start: 2021-09-02 | End: 2021-09-02 | Stop reason: HOSPADM

## 2021-09-02 RX ORDER — FAMOTIDINE 10 MG/ML
20 INJECTION, SOLUTION INTRAVENOUS ONCE
Status: COMPLETED | OUTPATIENT
Start: 2021-09-02 | End: 2021-09-02

## 2021-09-02 RX ORDER — MAGNESIUM HYDROXIDE 1200 MG/15ML
LIQUID ORAL AS NEEDED
Status: DISCONTINUED | OUTPATIENT
Start: 2021-09-02 | End: 2021-09-02 | Stop reason: HOSPADM

## 2021-09-02 RX ORDER — LIDOCAINE HYDROCHLORIDE 10 MG/ML
0.5 INJECTION, SOLUTION EPIDURAL; INFILTRATION; INTRACAUDAL; PERINEURAL ONCE AS NEEDED
Status: DISCONTINUED | OUTPATIENT
Start: 2021-09-02 | End: 2021-09-02 | Stop reason: HOSPADM

## 2021-09-02 RX ORDER — IBUPROFEN 600 MG/1
600 TABLET ORAL ONCE AS NEEDED
Status: DISCONTINUED | OUTPATIENT
Start: 2021-09-02 | End: 2021-09-02 | Stop reason: HOSPADM

## 2021-09-02 RX ORDER — HYDROCODONE BITARTRATE AND ACETAMINOPHEN 7.5; 325 MG/1; MG/1
1 TABLET ORAL ONCE AS NEEDED
Status: COMPLETED | OUTPATIENT
Start: 2021-09-02 | End: 2021-09-02

## 2021-09-02 RX ORDER — DEXAMETHASONE SODIUM PHOSPHATE 10 MG/ML
INJECTION INTRAMUSCULAR; INTRAVENOUS AS NEEDED
Status: DISCONTINUED | OUTPATIENT
Start: 2021-09-02 | End: 2021-09-02 | Stop reason: SURG

## 2021-09-02 RX ORDER — SODIUM CHLORIDE 0.9 % (FLUSH) 0.9 %
3-10 SYRINGE (ML) INJECTION AS NEEDED
Status: DISCONTINUED | OUTPATIENT
Start: 2021-09-02 | End: 2021-09-02 | Stop reason: HOSPADM

## 2021-09-02 RX ORDER — HYDRALAZINE HYDROCHLORIDE 20 MG/ML
5 INJECTION INTRAMUSCULAR; INTRAVENOUS
Status: DISCONTINUED | OUTPATIENT
Start: 2021-09-02 | End: 2021-09-02 | Stop reason: HOSPADM

## 2021-09-02 RX ORDER — SODIUM CHLORIDE, SODIUM LACTATE, POTASSIUM CHLORIDE, CALCIUM CHLORIDE 600; 310; 30; 20 MG/100ML; MG/100ML; MG/100ML; MG/100ML
9 INJECTION, SOLUTION INTRAVENOUS CONTINUOUS
Status: DISCONTINUED | OUTPATIENT
Start: 2021-09-02 | End: 2021-09-02 | Stop reason: HOSPADM

## 2021-09-02 RX ORDER — FENTANYL CITRATE 50 UG/ML
50 INJECTION, SOLUTION INTRAMUSCULAR; INTRAVENOUS
Status: COMPLETED | OUTPATIENT
Start: 2021-09-02 | End: 2021-09-02

## 2021-09-02 RX ORDER — HYDROMORPHONE HYDROCHLORIDE 1 MG/ML
0.5 INJECTION, SOLUTION INTRAMUSCULAR; INTRAVENOUS; SUBCUTANEOUS
Status: DISCONTINUED | OUTPATIENT
Start: 2021-09-02 | End: 2021-09-02 | Stop reason: HOSPADM

## 2021-09-02 RX ORDER — HYDROCODONE BITARTRATE AND ACETAMINOPHEN 5; 325 MG/1; MG/1
1-2 TABLET ORAL EVERY 4 HOURS PRN
Qty: 5 TABLET | Refills: 0 | Status: SHIPPED | OUTPATIENT
Start: 2021-09-02 | End: 2022-05-10

## 2021-09-02 RX ORDER — PROMETHAZINE HYDROCHLORIDE 25 MG/1
25 TABLET ORAL ONCE AS NEEDED
Status: DISCONTINUED | OUTPATIENT
Start: 2021-09-02 | End: 2021-09-02 | Stop reason: HOSPADM

## 2021-09-02 RX ADMIN — SODIUM CHLORIDE, POTASSIUM CHLORIDE, SODIUM LACTATE AND CALCIUM CHLORIDE 9 ML/HR: 600; 310; 30; 20 INJECTION, SOLUTION INTRAVENOUS at 13:57

## 2021-09-02 RX ADMIN — DEXAMETHASONE SODIUM PHOSPHATE 10 MG: 10 INJECTION INTRAMUSCULAR; INTRAVENOUS at 18:13

## 2021-09-02 RX ADMIN — FAMOTIDINE 20 MG: 10 INJECTION INTRAVENOUS at 13:57

## 2021-09-02 RX ADMIN — FENTANYL CITRATE 50 MCG: 50 INJECTION INTRAMUSCULAR; INTRAVENOUS at 18:22

## 2021-09-02 RX ADMIN — HYDROCODONE BITARTRATE AND ACETAMINOPHEN 1 TABLET: 7.5; 325 TABLET ORAL at 18:54

## 2021-09-02 RX ADMIN — ONDANSETRON 4 MG: 2 INJECTION INTRAMUSCULAR; INTRAVENOUS at 18:16

## 2021-09-02 RX ADMIN — CEFAZOLIN SODIUM 2 G: 2 INJECTION, SOLUTION INTRAVENOUS at 17:59

## 2021-09-02 RX ADMIN — FENTANYL CITRATE 50 MCG: 50 INJECTION INTRAMUSCULAR; INTRAVENOUS at 18:13

## 2021-09-02 RX ADMIN — CEFAZOLIN SODIUM 2 G: 2 INJECTION, SOLUTION INTRAVENOUS at 18:18

## 2021-09-02 RX ADMIN — PROPOFOL 200 MG: 10 INJECTION, EMULSION INTRAVENOUS at 18:10

## 2021-09-02 RX ADMIN — LIDOCAINE HYDROCHLORIDE 80 MG: 20 INJECTION, SOLUTION INFILTRATION; PERINEURAL at 18:10

## 2021-09-02 NOTE — ANESTHESIA PROCEDURE NOTES
Airway  Urgency: elective    Date/Time: 9/2/2021 6:11 PM    General Information and Staff    Patient location during procedure: OR  Anesthesiologist: Tez Brandt MD  CRNA: Marcellus Haynes CRNA    Indications and Patient Condition  Indications for airway management: airway protection    Preoxygenated: yes  MILS maintained throughout  Mask difficulty assessment: 1 - vent by mask    Final Airway Details  Final airway type: supraglottic airway      Successful airway: LMA  Size 4    Number of attempts at approach: 1  Assessment: lips, teeth, and gum same as pre-op

## 2021-09-02 NOTE — H&P
FIRST UROLOGY CONSULT      Patient Identification:  NAME:  Uriah Perez  Age:  70 y.o.   Sex:  male   :  1950   MRN:  [id number]       Chief complaint: Weak urinary stream    History of present illness:  This is a 70 year old man with a history of radical prostatectomy with a weak urinary stream and multiple UTI's. He presents for cystoscopy and possible internal urethrotomy. We discussed the risks, benefits, and alternatives, and he wishes to proceed.      Past medical history:  Past Medical History:   Diagnosis Date   • Arthritis     GENERALIZED   • At risk for sleep apnea    • Eczema     SCALP AREA   • Elevated cholesterol    • History of 2019 novel coronavirus disease (COVID-19)     2020 ([facility])   • Hypertension    • Lymphoma (CMS/HCC)    • Pre-diabetes    • Prostate cancer (CMS/HCC)    • Urgency of urination        Past surgical history:  Past Surgical History:   Procedure Laterality Date   • BICEPS TENDON REPAIR Left 2018   • COLONOSCOPY     • INGUINAL HERNIA REPAIR Left    • PROSTATECTOMY N/A 2021    Procedure: DAVINCI PROSTATECTOMY LAPAROSCOPIC WITH DILATERAL PELVIC LYMPH NODE DISSECTION;  Surgeon: Alberto Duran Jr., MD;  Location: [facility];  Service: [facility];  Laterality: N/A;   • TONSILLECTOMY     • UMBILICAL HERNIA REPAIR     • WRIST SURGERY Left        Allergies:  Patient has no known allergies.    Home medications:  Medications Prior to Admission   Medication Sig Dispense Refill Last Dose   • multivitamin with minerals (MULTIVITAMIN ADULT PO) Take 1 tablet by mouth Daily.   2021   • quinapril (ACCUPRIL) 10 MG tablet Take 20 mg by mouth Daily.   2021 at 0800   • rosuvastatin (CRESTOR) 10 MG tablet Take 10 mg by mouth Every Night.   2021 at 2000   • Aspirin (ASPIR-LOW PO) Take 81 mg by mouth Daily. PT HOLDING FOR SURGERY   2021   • Omega-3 Fatty Acids (fish oil) 1000 MG capsule capsule Take 1,000 mg by mouth Daily With  Breakfast. PT HOLDING FOR SURGERY   2021        Hospital medications:  ceFAZolin, 2 g, Intravenous, Once  sodium chloride, 3 mL, Intravenous, Q12H      lactated ringers, 9 mL/hr, Last Rate: 9 mL/hr (21 1357)      fentanyl  •  lidocaine PF 1%  •  midazolam  •  sodium chloride    Family history:  Family History   Problem Relation Age of Onset   • Cancer Mother    • Diabetes Father    • Cancer Father    • Malig Hyperthermia Neg Hx        Social history:  Social History     Tobacco Use   • Smoking status: Current Some Day Smoker     Types: Cigarettes   • Smokeless tobacco: Former User     Types: Snuff, Chew     Quit date:    • Tobacco comment: once a month   Vaping Use   • Vaping Use: Never used   Substance Use Topics   • Alcohol use: Yes     Comment: WEEKLY   • Drug use: Not Currently       Review of systems:      Positive for:  As per HPI  Negative for:  As per HPI    Objective:  TMax 24 hours:   Temp (24hrs), Av.6 °F (37 °C), Min:98.6 °F (37 °C), Max:98.6 °F (37 °C)      Vitals Ranges:   Temp:  [98.6 °F (37 °C)] 98.6 °F (37 °C)  Heart Rate:  [65] 65  Resp:  [18] 18  BP: (157)/(93) 157/93    Intake/Output Last 3 shifts:  No intake/output data recorded.     Physical Exam:    General Appearance:    Alert, cooperative, NAD                                       Neuro/Psych:   Orientation intact, mood/affect pleasant, no focal findings       Results review:   I reviewed the patient's new clinical results.    Data review:  Lab Results (last 24 hours)     Procedure Component Value Units Date/Time    POC Glucose Once [[id number]]  (Normal) Collected: 21 1316    Specimen: Blood Updated: 21 1317     Glucose 89 mg/dL      Comment: Meter: [id number] : [id number] Darrell GARCIA              Imaging:  Imaging Results (Last 24 Hours)     ** No results found for the last 24 hours. **             Assessment:       * No active hospital problems. *    Prostate cancer    Plan:     DVIU    Alberto  Chalo Duran Jr., MD  09/02/21  16:54 EDT

## 2021-09-02 NOTE — OP NOTE
Operative Report    [facility]    Patient: Uriah Perez  Age:      70 y.o.  :     1950  Sex:      male    Medical Record:  [id number]    Date of Operation/Procedure:  2021    Pre-op Diagnosis:   Urethral stricture    Post-Op Diagnosis Codes:   Same    Pre-operative Diagnosis Free Text:  * No pre-op diagnosis entered *     Name of Operation/Procedure:  Procedure(s) and Anesthesia Type:     * CYSTOSCOPY WITH INTERNAL URETHROTOMY - General    Findings/Complications:  No complications.    Dense urethral stricture at the bladder neck consistent with bladder neck contracture.     Description of procedure: After informed consent was obtained, the patient was taken to the OR and general anesthesia was induced. He was placed in lithotomy position, prepped and draped in the standard fashion. A timeout was performed. The urethrotome was passed through the urethra. At the urethrovesical anastomosis, there was a dense urethral stricture. A Sensor wire was passed through it. Using the cold knife, the stricture was incised in a Mercedes fashion. The urethrotome was passed into the bladder. No tumors, stones, or foreign bodies were observed. A 18 Fr "[employer] tip catheter was passed over the wire. The patient was awakened from anesthesia and taken to the recovery room.    Estimated Blood Loss: minimal    Specimens: * No orders in the log *    Fluids/Drains: 18 Fr [place] tip catheter    Alberto Duran Jr., MD  2021  18:34 EDT

## 2021-09-02 NOTE — SIGNIFICANT NOTE
Called Diego, pt's brother, to let him know that pt will be going to surgery later than expected and we will call Diego to let him know when pt goes to surgery.

## 2021-09-02 NOTE — ANESTHESIA PREPROCEDURE EVALUATION
Anesthesia Evaluation     Patient summary reviewed   no history of anesthetic complications:  NPO Solid Status: > 8 hours  NPO Liquid Status: > 2 hours           Airway   Mallampati: II  TM distance: >3 FB  Neck ROM: full  Large neck circumference  Comment: Blade: Jemima  Blade size: 4  ETT size (mm): 8.0  Cormack-Lehane Classification: grade IIb - view of arytenoids or posterior of glottis only  Dental - normal exam         Pulmonary    (+) a smoker Former,   (-) shortness of breath  Cardiovascular   Exercise tolerance: good (4-7 METS)    ECG reviewed    (+) hypertension, hyperlipidemia,   (-) angina, BURGER      Neuro/Psych  (+) numbness,     GI/Hepatic/Renal/Endo    (+) obesity,       Musculoskeletal     Abdominal    Substance History   (+) alcohol use,      OB/GYN          Other   arthritis,    history of cancer                      Anesthesia Plan    ASA 3     general   (I have reviewed the patient's history with the patient and the chart, including all pertinent laboratory results and imaging. I have explained the risks of anesthesia including but not limited to dental damage, corneal abrasion, nerve injury, MI, stroke, and death. Questions asked and answered. Anesthetic plan discussed with patient and team as indicated. Patient expressed understanding of the above.  )  intravenous induction     Anesthetic plan, all risks, benefits, and alternatives have been provided, discussed and informed consent has been obtained with: patient.

## 2021-09-03 NOTE — ANESTHESIA POSTPROCEDURE EVALUATION
"Patient: Uriah Perez    Procedure Summary     Date: 09/02/21 Room / Location: [facility] OR 01 / [facility] MAIN OR    Anesthesia Start: 1802 Anesthesia Stop: 1836    Procedure: CYSTOSCOPY WITH INTERNAL URETHROTOMY (N/A ) Diagnosis:     Surgeons: Alberto Duran Jr., MD Provider: Tez Brandt MD    Anesthesia Type: general ASA Status: 3          Anesthesia Type: general    Vitals  Vitals Value Taken Time   /85 09/02/21 1911   Temp 36.6 °C (97.8 °F) 09/02/21 1910   Pulse 58 09/02/21 1914   Resp 18 09/02/21 1910   SpO2 97 % 09/02/21 1914   Vitals shown include unvalidated device data.        Post Anesthesia Care and Evaluation    Pain management: adequate  Airway patency: patent  Anesthetic complications: No anesthetic complications    Cardiovascular status: acceptable  Respiratory status: acceptable  Hydration status: acceptable    Comments: /88   Pulse 59   Temp 36.6 °C (97.8 °F) (Oral)   Resp 18   Ht 172.7 cm (68\")   Wt 110 kg (242 lb 11.6 oz)   SpO2 95%   BMI 36.91 kg/m²         "

## 2021-11-03 ENCOUNTER — TRANSCRIBE ORDERS (OUTPATIENT)
Dept: LAB | Facility: HOSPITAL | Age: 71
End: 2021-11-03

## 2021-11-03 ENCOUNTER — LAB (OUTPATIENT)
Dept: LAB | Facility: HOSPITAL | Age: 71
End: 2021-11-03

## 2021-11-03 DIAGNOSIS — R73.01 IMPAIRED FASTING GLUCOSE: ICD-10-CM

## 2021-11-03 DIAGNOSIS — I10 ESSENTIAL HYPERTENSION, MALIGNANT: ICD-10-CM

## 2021-11-03 DIAGNOSIS — D72.9 WHITE BLOOD CELL DISORDER: ICD-10-CM

## 2021-11-03 DIAGNOSIS — E55.9 VITAMIN D DEFICIENCY: ICD-10-CM

## 2021-11-03 DIAGNOSIS — Z12.5 SPECIAL SCREENING FOR MALIGNANT NEOPLASM OF PROSTATE: ICD-10-CM

## 2021-11-03 DIAGNOSIS — E78.00 PURE HYPERCHOLESTEROLEMIA: ICD-10-CM

## 2021-11-03 DIAGNOSIS — R73.01 IMPAIRED FASTING GLUCOSE: Primary | ICD-10-CM

## 2021-11-03 LAB
25(OH)D3 SERPL-MCNC: 39.2 NG/ML
ALBUMIN SERPL-MCNC: 4.3 G/DL (ref 3.5–5.2)
ALBUMIN/GLOB SERPL: 2 G/DL
ALP SERPL-CCNC: 61 U/L (ref 39–117)
ALT SERPL W P-5'-P-CCNC: 27 U/L (ref 1–41)
ANION GAP SERPL CALCULATED.3IONS-SCNC: 5.5 MMOL/L (ref 5–15)
AST SERPL-CCNC: 19 U/L (ref 1–40)
BASOPHILS # BLD AUTO: 0.12 10*3/MM3 (ref 0–0.2)
BASOPHILS NFR BLD AUTO: 1.3 % (ref 0–1.5)
BILIRUB SERPL-MCNC: 1.3 MG/DL (ref 0–1.2)
BUN SERPL-MCNC: 18 MG/DL (ref 8–23)
BUN/CREAT SERPL: 20.2 (ref 7–25)
CALCIUM SPEC-SCNC: 9.4 MG/DL (ref 8.6–10.5)
CHLORIDE SERPL-SCNC: 105 MMOL/L (ref 98–107)
CHOLEST SERPL-MCNC: 191 MG/DL (ref 0–200)
CO2 SERPL-SCNC: 29.5 MMOL/L (ref 22–29)
CREAT SERPL-MCNC: 0.89 MG/DL (ref 0.76–1.27)
DEPRECATED RDW RBC AUTO: 41.5 FL (ref 37–54)
EOSINOPHIL # BLD AUTO: 0.38 10*3/MM3 (ref 0–0.4)
EOSINOPHIL NFR BLD AUTO: 4.2 % (ref 0.3–6.2)
ERYTHROCYTE [DISTWIDTH] IN BLOOD BY AUTOMATED COUNT: 12.6 % (ref 12.3–15.4)
GFR SERPL CREATININE-BSD FRML MDRD: 85 ML/MIN/1.73
GLOBULIN UR ELPH-MCNC: 2.1 GM/DL
GLUCOSE SERPL-MCNC: 121 MG/DL (ref 65–99)
HBA1C MFR BLD: 5.9 % (ref 4.8–5.6)
HCT VFR BLD AUTO: 45.9 % (ref 37.5–51)
HDLC SERPL-MCNC: 56 MG/DL (ref 40–60)
HGB BLD-MCNC: 14.9 G/DL (ref 13–17.7)
IMM GRANULOCYTES # BLD AUTO: 0.03 10*3/MM3 (ref 0–0.05)
IMM GRANULOCYTES NFR BLD AUTO: 0.3 % (ref 0–0.5)
LDLC SERPL CALC-MCNC: 113 MG/DL (ref 0–100)
LDLC/HDLC SERPL: 1.96 {RATIO}
LYMPHOCYTES # BLD AUTO: 3.84 10*3/MM3 (ref 0.7–3.1)
LYMPHOCYTES NFR BLD AUTO: 42.2 % (ref 19.6–45.3)
MCH RBC QN AUTO: 29.6 PG (ref 26.6–33)
MCHC RBC AUTO-ENTMCNC: 32.5 G/DL (ref 31.5–35.7)
MCV RBC AUTO: 91.1 FL (ref 79–97)
MONOCYTES # BLD AUTO: 0.72 10*3/MM3 (ref 0.1–0.9)
MONOCYTES NFR BLD AUTO: 7.9 % (ref 5–12)
NEUTROPHILS NFR BLD AUTO: 4.01 10*3/MM3 (ref 1.7–7)
NEUTROPHILS NFR BLD AUTO: 44.1 % (ref 42.7–76)
NRBC BLD AUTO-RTO: 0 /100 WBC (ref 0–0.2)
PLATELET # BLD AUTO: 229 10*3/MM3 (ref 140–450)
PMV BLD AUTO: 12 FL (ref 6–12)
POTASSIUM SERPL-SCNC: 4.5 MMOL/L (ref 3.5–5.2)
PROT SERPL-MCNC: 6.4 G/DL (ref 6–8.5)
PSA SERPL-MCNC: <0.014 NG/ML (ref 0–4)
RBC # BLD AUTO: 5.04 10*6/MM3 (ref 4.14–5.8)
SODIUM SERPL-SCNC: 140 MMOL/L (ref 136–145)
TRIGL SERPL-MCNC: 127 MG/DL (ref 0–150)
TSH SERPL DL<=0.05 MIU/L-ACNC: 2.61 UIU/ML (ref 0.27–4.2)
VLDLC SERPL-MCNC: 22 MG/DL (ref 5–40)
WBC # BLD AUTO: 9.1 10*3/MM3 (ref 3.4–10.8)

## 2021-11-03 PROCEDURE — 80053 COMPREHEN METABOLIC PANEL: CPT

## 2021-11-03 PROCEDURE — 84443 ASSAY THYROID STIM HORMONE: CPT

## 2021-11-03 PROCEDURE — 83036 HEMOGLOBIN GLYCOSYLATED A1C: CPT

## 2021-11-03 PROCEDURE — 85025 COMPLETE CBC W/AUTO DIFF WBC: CPT

## 2021-11-03 PROCEDURE — 80061 LIPID PANEL: CPT

## 2021-11-03 PROCEDURE — G0103 PSA SCREENING: HCPCS

## 2021-11-03 PROCEDURE — 82306 VITAMIN D 25 HYDROXY: CPT

## 2021-11-03 PROCEDURE — 36415 COLL VENOUS BLD VENIPUNCTURE: CPT

## 2021-11-04 NOTE — PROGRESS NOTES
"Chief Complaint/ HPI: F/U WITH PROSTATE CA ISSUES AND REPAIR OF URETHRAL TISSUE ,  STILL WITH INCONTINENCE   WGT UP   No chief complaint on file.      Objective   Vital Signs  Vitals:    11/10/21 1239   BP: 146/84   Pulse: 78   Temp: 96.8 °F (36 °C)   SpO2: 94%   Weight: 112 kg (246 lb)   Height: 172.7 cm (67.99\")      Body mass index is 37.41 kg/m².  Review of Systems   Constitutional: Negative.    HENT: Negative.    Eyes: Negative.    Respiratory: Negative.    Cardiovascular: Negative.    Gastrointestinal: Negative.    Endocrine: Negative.    Genitourinary: Negative.    Musculoskeletal: Negative.    Allergic/Immunologic: Negative.    Neurological: Negative.    Hematological: Negative.    Psychiatric/Behavioral: Negative.       Physical Exam  Constitutional:       General: He is not in acute distress.     Appearance: Normal appearance.   HENT:      Head: Normocephalic.      Mouth/Throat:      Mouth: Mucous membranes are moist.   Eyes:      Conjunctiva/sclera: Conjunctivae normal.      Pupils: Pupils are equal, round, and reactive to light.   Cardiovascular:      Rate and Rhythm: Normal rate and regular rhythm.      Pulses: Normal pulses.      Heart sounds: Normal heart sounds.   Pulmonary:      Effort: Pulmonary effort is normal.      Breath sounds: Normal breath sounds.   Abdominal:      General: Abdomen is flat. Bowel sounds are normal.      Palpations: Abdomen is soft.   Musculoskeletal:         General: No swelling. Normal range of motion.      Cervical back: Neck supple.   Skin:     General: Skin is warm and dry.      Coloration: Skin is not jaundiced.   Neurological:      General: No focal deficit present.      Mental Status: He is alert and oriented to person, place, and time. Mental status is at baseline.   Psychiatric:         Mood and Affect: Mood normal.         Behavior: Behavior normal.         Thought Content: Thought content normal.         Judgment: Judgment normal.      OBESE . SOFT NT     Result " Review :   No results found for: PROBNP, BNP  CMP    CMP 5/13/21 8/31/21 11/3/21   Glucose 121 (A) 100 (A) 121 (A)   BUN 19 17 18   Creatinine 1.07 0.98 0.89   eGFR Non African Am  76 85   Sodium 142 143 140   Potassium 4.6 4.7 4.5   Chloride 105 107 105   Calcium 9.2 9.3 9.4   Albumin 4.1  4.30   Total Bilirubin 0.97  1.3 (A)   Alkaline Phosphatase 71  61   AST (SGOT) 22  19   ALT (SGPT) 30  27   (A) Abnormal value            CBC w/diff    CBC w/Diff 8/24/21 8/31/21 11/3/21   WBC 10.90 9.33 9.10   RBC 5.04 4.94 5.04   Hemoglobin 14.8 14.4 14.9   Hematocrit 45.7 45.4 45.9   MCV 90.7 91.9 91.1   MCH 29.4 29.1 29.6   MCHC 32.4 31.7 32.5   RDW 13.9 13.5 12.6   Platelets 224 220 229   Neutrophil Rel % 55.0  44.1   Immature Granulocyte Rel %   0.3   Lymphocyte Rel % 35.3  42.2   Monocyte Rel % 6.4  7.9   Eosinophil Rel % 3.0  4.2   Basophil Rel % 0.3  1.3            Lipid Panel    Lipid Panel 11/16/20 5/13/21 11/3/21   Total Cholesterol   191   Total Cholesterol 126 165    Triglycerides 104 80 127   HDL Cholesterol 38 (A) 51 56   VLDL Cholesterol 21 16 22   LDL Cholesterol  67 (A) 98 113 (A)   LDL/HDL Ratio   1.96   (A) Abnormal value       Comments are available for some flowsheets but are not being displayed.            Lab Results   Component Value Date    TSH 2.610 11/03/2021    TSH 2.020 11/16/2020      No results found for: FREET4   A1C Last 3 Results    HGBA1C Last 3 Results 11/16/20 5/13/21 11/3/21   Hemoglobin A1C 6.7 (A) 6.1 (A) 5.90 (A)   (A) Abnormal value       Comments are available for some flowsheets but are not being displayed.            PSA    PSA 11/3/21   PSA <0.014                            ICD-10-CM ICD-9-CM   1. Prostate cancer (HCC)  C61 185   2. IFG (impaired fasting glucose)  R73.01 790.21   3. Class 1 obesity due to excess calories without serious comorbidity with body mass index (BMI) of 34.0 to 34.9 in adult  E66.09 278.00    Z68.34 V85.34   4. Hypertension, essential  I10 401.9   5. Mixed  hyperlipidemia  E78.2 272.2       Assessment and Plan    Diagnoses and all orders for this visit:    1. Prostate cancer (HCC) (Primary)    2. IFG (impaired fasting glucose)    3. Class 1 obesity due to excess calories without serious comorbidity with body mass index (BMI) of 34.0 to 34.9 in adult    4. Hypertension, essential    5. 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 --BETTER, DID SEE DR KEREN ESPINOZA, GREAT JOB 60 LB IN 1 YR SINCE AUG 2016,--now back up ---Discussed continued exercise weight loss, November 2020    IFG ,HGA1C = STABLE 6.1 MAY 2021 , DOWN 5.9 NOV 2021,     htn --continue QUINAPRIL 20 MG QD     elevated chol--we'll increase dose to 20 mg November 2021 -----discussed using nightly all co-Q10 to help with muscle aches if necessary,    psa per urology, ==6.2--BX POSITIVE CA,----UP TO 8.5 --BX --SEPT, 2020,------DR RIVERA==JAN 2021, TOTAL PROSTATECTOMY    Follow Up   Return in about 6 months (around 5/10/2022).  Patient was given instructions and counseling regarding his condition or for health maintenance advice. Please see specific information pulled into the AVS if appropriate.

## 2021-11-10 ENCOUNTER — OFFICE VISIT (OUTPATIENT)
Dept: INTERNAL MEDICINE | Facility: CLINIC | Age: 71
End: 2021-11-10

## 2021-11-10 VITALS
TEMPERATURE: 96.8 F | SYSTOLIC BLOOD PRESSURE: 146 MMHG | BODY MASS INDEX: 37.28 KG/M2 | OXYGEN SATURATION: 94 % | DIASTOLIC BLOOD PRESSURE: 84 MMHG | WEIGHT: 246 LBS | HEART RATE: 78 BPM | HEIGHT: 68 IN

## 2021-11-10 DIAGNOSIS — C61 PROSTATE CANCER (HCC): Primary | ICD-10-CM

## 2021-11-10 DIAGNOSIS — R73.01 IFG (IMPAIRED FASTING GLUCOSE): ICD-10-CM

## 2021-11-10 DIAGNOSIS — I10 HYPERTENSION, ESSENTIAL: ICD-10-CM

## 2021-11-10 DIAGNOSIS — E66.09 CLASS 1 OBESITY DUE TO EXCESS CALORIES WITHOUT SERIOUS COMORBIDITY WITH BODY MASS INDEX (BMI) OF 34.0 TO 34.9 IN ADULT: ICD-10-CM

## 2021-11-10 DIAGNOSIS — E78.2 MIXED HYPERLIPIDEMIA: ICD-10-CM

## 2021-11-10 PROBLEM — E66.811 CLASS 1 OBESITY DUE TO EXCESS CALORIES WITHOUT SERIOUS COMORBIDITY WITH BODY MASS INDEX (BMI) OF 34.0 TO 34.9 IN ADULT: Status: ACTIVE | Noted: 2021-11-10

## 2021-11-10 PROCEDURE — 99214 OFFICE O/P EST MOD 30 MIN: CPT | Performed by: INTERNAL MEDICINE

## 2021-11-10 RX ORDER — ROSUVASTATIN CALCIUM 20 MG/1
20 TABLET, COATED ORAL DAILY
Qty: 90 TABLET | Refills: 3 | Status: SHIPPED | OUTPATIENT
Start: 2021-11-10 | End: 2022-05-19 | Stop reason: SDUPTHER

## 2021-11-22 RX ORDER — ROSUVASTATIN CALCIUM 10 MG/1
TABLET, COATED ORAL
Qty: 90 TABLET | Refills: 1 | Status: SHIPPED | OUTPATIENT
Start: 2021-11-22 | End: 2022-05-10

## 2021-12-29 RX ORDER — QUINAPRIL 20 MG/1
TABLET ORAL
Qty: 90 TABLET | Refills: 3 | Status: SHIPPED | OUTPATIENT
Start: 2021-12-29 | End: 2022-05-28

## 2022-01-21 ENCOUNTER — TRANSCRIBE ORDERS (OUTPATIENT)
Dept: ADMINISTRATIVE | Facility: HOSPITAL | Age: 72
End: 2022-01-21

## 2022-01-21 ENCOUNTER — TRANSCRIBE ORDERS (OUTPATIENT)
Dept: CARDIOLOGY | Facility: HOSPITAL | Age: 72
End: 2022-01-21

## 2022-01-21 ENCOUNTER — LAB (OUTPATIENT)
Dept: LAB | Facility: HOSPITAL | Age: 72
End: 2022-01-21

## 2022-01-21 ENCOUNTER — HOSPITAL ENCOUNTER (OUTPATIENT)
Dept: CARDIOLOGY | Facility: HOSPITAL | Age: 72
Discharge: HOME | End: 2022-01-21

## 2022-01-21 DIAGNOSIS — N40.1 ENLARGED PROSTATE WITH URINARY OBSTRUCTION: Primary | ICD-10-CM

## 2022-01-21 DIAGNOSIS — N40.1 ENLARGED PROSTATE WITH URINARY OBSTRUCTION: ICD-10-CM

## 2022-01-21 DIAGNOSIS — N13.8 ENLARGED PROSTATE WITH URINARY OBSTRUCTION: Primary | ICD-10-CM

## 2022-01-21 DIAGNOSIS — N13.8 ENLARGED PROSTATE WITH URINARY OBSTRUCTION: ICD-10-CM

## 2022-01-21 DIAGNOSIS — Z01.811 PRE-OP CHEST EXAM: Primary | ICD-10-CM

## 2022-01-21 LAB
ANION GAP SERPL CALCULATED.3IONS-SCNC: 6.7 MMOL/L (ref 5–15)
BASOPHILS # BLD AUTO: 0.08 10*3/MM3 (ref 0–0.2)
BASOPHILS NFR BLD AUTO: 0.8 % (ref 0–1.5)
BUN SERPL-MCNC: 13 MG/DL (ref 8–23)
BUN/CREAT SERPL: 12.4 (ref 7–25)
CALCIUM SPEC-SCNC: 9.8 MG/DL (ref 8.6–10.5)
CHLORIDE SERPL-SCNC: 104 MMOL/L (ref 98–107)
CO2 SERPL-SCNC: 31.3 MMOL/L (ref 22–29)
CREAT SERPL-MCNC: 1.05 MG/DL (ref 0.76–1.27)
DEPRECATED RDW RBC AUTO: 41.2 FL (ref 37–54)
EOSINOPHIL # BLD AUTO: 0.25 10*3/MM3 (ref 0–0.4)
EOSINOPHIL NFR BLD AUTO: 2.6 % (ref 0.3–6.2)
ERYTHROCYTE [DISTWIDTH] IN BLOOD BY AUTOMATED COUNT: 12.6 % (ref 12.3–15.4)
GFR SERPL CREATININE-BSD FRML MDRD: 70 ML/MIN/1.73
GLUCOSE SERPL-MCNC: 105 MG/DL (ref 65–99)
HCT VFR BLD AUTO: 47.5 % (ref 37.5–51)
HGB BLD-MCNC: 15.8 G/DL (ref 13–17.7)
IMM GRANULOCYTES # BLD AUTO: 0.03 10*3/MM3 (ref 0–0.05)
IMM GRANULOCYTES NFR BLD AUTO: 0.3 % (ref 0–0.5)
LYMPHOCYTES # BLD AUTO: 3.93 10*3/MM3 (ref 0.7–3.1)
LYMPHOCYTES NFR BLD AUTO: 41.6 % (ref 19.6–45.3)
MCH RBC QN AUTO: 30.2 PG (ref 26.6–33)
MCHC RBC AUTO-ENTMCNC: 33.3 G/DL (ref 31.5–35.7)
MCV RBC AUTO: 90.6 FL (ref 79–97)
MONOCYTES # BLD AUTO: 0.72 10*3/MM3 (ref 0.1–0.9)
MONOCYTES NFR BLD AUTO: 7.6 % (ref 5–12)
NEUTROPHILS NFR BLD AUTO: 4.43 10*3/MM3 (ref 1.7–7)
NEUTROPHILS NFR BLD AUTO: 47.1 % (ref 42.7–76)
NRBC BLD AUTO-RTO: 0 /100 WBC (ref 0–0.2)
PLATELET # BLD AUTO: 236 10*3/MM3 (ref 140–450)
PMV BLD AUTO: 11.4 FL (ref 6–12)
POTASSIUM SERPL-SCNC: 4.8 MMOL/L (ref 3.5–5.2)
QT INTERVAL: 410 MS
RBC # BLD AUTO: 5.24 10*6/MM3 (ref 4.14–5.8)
SODIUM SERPL-SCNC: 142 MMOL/L (ref 136–145)
WBC NRBC COR # BLD: 9.44 10*3/MM3 (ref 3.4–10.8)

## 2022-01-21 PROCEDURE — 80048 BASIC METABOLIC PNL TOTAL CA: CPT

## 2022-01-21 PROCEDURE — 93005 ELECTROCARDIOGRAM TRACING: CPT

## 2022-01-21 PROCEDURE — 85025 COMPLETE CBC W/AUTO DIFF WBC: CPT

## 2022-01-21 PROCEDURE — 36415 COLL VENOUS BLD VENIPUNCTURE: CPT

## 2022-01-21 PROCEDURE — 93010 ELECTROCARDIOGRAM REPORT: CPT | Performed by: INTERNAL MEDICINE

## 2022-05-05 ENCOUNTER — TRANSCRIBE ORDERS (OUTPATIENT)
Dept: ADMINISTRATIVE | Facility: HOSPITAL | Age: 72
End: 2022-05-05

## 2022-05-05 ENCOUNTER — LAB (OUTPATIENT)
Dept: LAB | Facility: HOSPITAL | Age: 72
End: 2022-05-05

## 2022-05-05 DIAGNOSIS — E55.9 AVITAMINOSIS D: ICD-10-CM

## 2022-05-05 DIAGNOSIS — E66.09 CLASS 1 OBESITY DUE TO EXCESS CALORIES WITHOUT SERIOUS COMORBIDITY WITH BODY MASS INDEX (BMI) OF 34.0 TO 34.9 IN ADULT: ICD-10-CM

## 2022-05-05 DIAGNOSIS — Z12.5 PROSTATE CANCER SCREENING: ICD-10-CM

## 2022-05-05 DIAGNOSIS — E55.9 AVITAMINOSIS D: Primary | ICD-10-CM

## 2022-05-05 DIAGNOSIS — I10 HYPERTENSION, ESSENTIAL: ICD-10-CM

## 2022-05-05 DIAGNOSIS — C61 PROSTATE CANCER: ICD-10-CM

## 2022-05-05 DIAGNOSIS — E78.2 MIXED HYPERLIPIDEMIA: ICD-10-CM

## 2022-05-05 DIAGNOSIS — D72.9 DISORDER OF WHITE BLOOD CELLS: ICD-10-CM

## 2022-05-05 DIAGNOSIS — R73.01 IFG (IMPAIRED FASTING GLUCOSE): ICD-10-CM

## 2022-05-05 LAB
25(OH)D3 SERPL-MCNC: 37.5 NG/ML (ref 30–100)
ALBUMIN SERPL-MCNC: 4.2 G/DL (ref 3.5–5.2)
ALBUMIN/GLOB SERPL: 2 G/DL
ALP SERPL-CCNC: 63 U/L (ref 39–117)
ALT SERPL W P-5'-P-CCNC: 29 U/L (ref 1–41)
ANION GAP SERPL CALCULATED.3IONS-SCNC: 9 MMOL/L (ref 5–15)
AST SERPL-CCNC: 23 U/L (ref 1–40)
BASOPHILS # BLD AUTO: 0.08 10*3/MM3 (ref 0–0.2)
BASOPHILS NFR BLD AUTO: 0.8 % (ref 0–1.5)
BILIRUB SERPL-MCNC: 1.2 MG/DL (ref 0–1.2)
BUN SERPL-MCNC: 13 MG/DL (ref 8–23)
BUN/CREAT SERPL: 15.5 (ref 7–25)
CALCIUM SPEC-SCNC: 9.3 MG/DL (ref 8.6–10.5)
CHLORIDE SERPL-SCNC: 103 MMOL/L (ref 98–107)
CHOLEST SERPL-MCNC: 184 MG/DL (ref 0–200)
CO2 SERPL-SCNC: 27 MMOL/L (ref 22–29)
CREAT SERPL-MCNC: 0.84 MG/DL (ref 0.76–1.27)
DEPRECATED RDW RBC AUTO: 44.3 FL (ref 37–54)
EGFRCR SERPLBLD CKD-EPI 2021: 94.4 ML/MIN/1.73
EOSINOPHIL # BLD AUTO: 0.34 10*3/MM3 (ref 0–0.4)
EOSINOPHIL NFR BLD AUTO: 3.3 % (ref 0.3–6.2)
ERYTHROCYTE [DISTWIDTH] IN BLOOD BY AUTOMATED COUNT: 12.8 % (ref 12.3–15.4)
GLOBULIN UR ELPH-MCNC: 2.1 GM/DL
GLUCOSE SERPL-MCNC: 123 MG/DL (ref 65–99)
HBA1C MFR BLD: 6 % (ref 4.8–5.6)
HCT VFR BLD AUTO: 48 % (ref 37.5–51)
HDLC SERPL-MCNC: 60 MG/DL (ref 40–60)
HGB BLD-MCNC: 15.7 G/DL (ref 13–17.7)
IMM GRANULOCYTES # BLD AUTO: 0.05 10*3/MM3 (ref 0–0.05)
IMM GRANULOCYTES NFR BLD AUTO: 0.5 % (ref 0–0.5)
LDLC SERPL CALC-MCNC: 99 MG/DL (ref 0–100)
LDLC/HDLC SERPL: 1.59 {RATIO}
LYMPHOCYTES # BLD AUTO: 4.09 10*3/MM3 (ref 0.7–3.1)
LYMPHOCYTES NFR BLD AUTO: 39.9 % (ref 19.6–45.3)
MCH RBC QN AUTO: 30.5 PG (ref 26.6–33)
MCHC RBC AUTO-ENTMCNC: 32.7 G/DL (ref 31.5–35.7)
MCV RBC AUTO: 93.2 FL (ref 79–97)
MONOCYTES # BLD AUTO: 0.63 10*3/MM3 (ref 0.1–0.9)
MONOCYTES NFR BLD AUTO: 6.2 % (ref 5–12)
NEUTROPHILS NFR BLD AUTO: 49.3 % (ref 42.7–76)
NEUTROPHILS NFR BLD AUTO: 5.05 10*3/MM3 (ref 1.7–7)
NRBC BLD AUTO-RTO: 0 /100 WBC (ref 0–0.2)
PLATELET # BLD AUTO: 224 10*3/MM3 (ref 140–450)
PMV BLD AUTO: 10.9 FL (ref 6–12)
POTASSIUM SERPL-SCNC: 4.3 MMOL/L (ref 3.5–5.2)
PROT SERPL-MCNC: 6.3 G/DL (ref 6–8.5)
PSA SERPL-MCNC: <0.014 NG/ML (ref 0–4)
RBC # BLD AUTO: 5.15 10*6/MM3 (ref 4.14–5.8)
SODIUM SERPL-SCNC: 139 MMOL/L (ref 136–145)
TRIGL SERPL-MCNC: 143 MG/DL (ref 0–150)
TSH SERPL DL<=0.05 MIU/L-ACNC: 3.08 UIU/ML (ref 0.27–4.2)
VLDLC SERPL-MCNC: 25 MG/DL (ref 5–40)
WBC NRBC COR # BLD: 10.24 10*3/MM3 (ref 3.4–10.8)

## 2022-05-05 PROCEDURE — 83036 HEMOGLOBIN GLYCOSYLATED A1C: CPT

## 2022-05-05 PROCEDURE — 82306 VITAMIN D 25 HYDROXY: CPT

## 2022-05-05 PROCEDURE — 80061 LIPID PANEL: CPT

## 2022-05-05 PROCEDURE — G0103 PSA SCREENING: HCPCS

## 2022-05-05 PROCEDURE — 85025 COMPLETE CBC W/AUTO DIFF WBC: CPT

## 2022-05-05 PROCEDURE — 80053 COMPREHEN METABOLIC PANEL: CPT

## 2022-05-05 PROCEDURE — 36415 COLL VENOUS BLD VENIPUNCTURE: CPT

## 2022-05-05 PROCEDURE — 84443 ASSAY THYROID STIM HORMONE: CPT

## 2022-05-10 ENCOUNTER — OFFICE VISIT (OUTPATIENT)
Dept: INTERNAL MEDICINE | Facility: CLINIC | Age: 72
End: 2022-05-10

## 2022-05-10 VITALS
DIASTOLIC BLOOD PRESSURE: 88 MMHG | HEIGHT: 68 IN | TEMPERATURE: 97.7 F | SYSTOLIC BLOOD PRESSURE: 155 MMHG | BODY MASS INDEX: 37.38 KG/M2 | HEART RATE: 68 BPM | OXYGEN SATURATION: 96 % | WEIGHT: 246.6 LBS

## 2022-05-10 DIAGNOSIS — E78.2 MIXED HYPERLIPIDEMIA: Primary | ICD-10-CM

## 2022-05-10 DIAGNOSIS — I10 HYPERTENSION, ESSENTIAL: ICD-10-CM

## 2022-05-10 DIAGNOSIS — N32.0 BLADDER NECK CONTRACTURE: ICD-10-CM

## 2022-05-10 DIAGNOSIS — C61 PROSTATE CANCER: ICD-10-CM

## 2022-05-10 DIAGNOSIS — C85.96 LYMPHOMA OF LYMPH NODES IN PELVIS, UNSPECIFIED LYMPHOMA TYPE: ICD-10-CM

## 2022-05-10 DIAGNOSIS — R73.01 IFG (IMPAIRED FASTING GLUCOSE): ICD-10-CM

## 2022-05-10 DIAGNOSIS — E66.09 CLASS 1 OBESITY DUE TO EXCESS CALORIES WITHOUT SERIOUS COMORBIDITY WITH BODY MASS INDEX (BMI) OF 34.0 TO 34.9 IN ADULT: ICD-10-CM

## 2022-05-10 PROCEDURE — 99213 OFFICE O/P EST LOW 20 MIN: CPT | Performed by: INTERNAL MEDICINE

## 2022-05-10 RX ORDER — MULTIPLE VITAMINS W/ MINERALS TAB 9MG-400MCG
1 TAB ORAL DAILY
COMMUNITY

## 2022-05-10 RX ORDER — ASPIRIN 81 MG/1
81 TABLET ORAL DAILY
COMMUNITY

## 2022-05-10 RX ORDER — VALSARTAN AND HYDROCHLOROTHIAZIDE 320; 12.5 MG/1; MG/1
1 TABLET, FILM COATED ORAL DAILY
Qty: 90 TABLET | Refills: 3 | Status: SHIPPED | OUTPATIENT
Start: 2022-05-10 | End: 2023-05-10

## 2022-05-10 RX ORDER — CHLORAL HYDRATE 500 MG
CAPSULE ORAL
COMMUNITY

## 2022-05-10 NOTE — PROGRESS NOTES
"Answers for HPI/ROS submitted by the patient on 5/3/2022  What is the primary reason for your visit?: Physical    Chief Complaint/ HPI:   F/u with htn and wgt issues   And elevated chol ,  prostated ca issues         Objective   Vital Signs  Vitals:    05/10/22 1409   BP: 155/88   Pulse: 68   Temp: 97.7 °F (36.5 °C)   SpO2: 96%   Weight: 112 kg (246 lb 9.6 oz)   Height: 172.7 cm (67.99\")      Body mass index is 37.5 kg/m².  Review of Systems   Constitutional: Negative.    HENT: Negative.    Eyes: Negative.    Respiratory: Negative.    Cardiovascular: Negative.    Gastrointestinal: Negative.    Endocrine: Negative.    Genitourinary: Negative.    Musculoskeletal: Negative.    Allergic/Immunologic: Negative.    Neurological: Negative.    Hematological: Negative.    Psychiatric/Behavioral: Negative.       Physical Exam  Constitutional:       General: He is not in acute distress.     Appearance: Normal appearance.   HENT:      Head: Normocephalic.      Mouth/Throat:      Mouth: Mucous membranes are moist.   Eyes:      Conjunctiva/sclera: Conjunctivae normal.      Pupils: Pupils are equal, round, and reactive to light.   Cardiovascular:      Rate and Rhythm: Normal rate and regular rhythm.      Pulses: Normal pulses.      Heart sounds: Normal heart sounds.   Pulmonary:      Effort: Pulmonary effort is normal.      Breath sounds: Normal breath sounds.   Abdominal:      General: Abdomen is flat. Bowel sounds are normal.      Palpations: Abdomen is soft.   Musculoskeletal:         General: No swelling. Normal range of motion.      Cervical back: Neck supple.   Skin:     General: Skin is warm and dry.      Coloration: Skin is not jaundiced.   Neurological:      General: No focal deficit present.      Mental Status: He is alert and oriented to person, place, and time. Mental status is at baseline.   Psychiatric:         Mood and Affect: Mood normal.         Behavior: Behavior normal.         Thought Content: Thought content " normal.         Judgment: Judgment normal.        Result Review :   No results found for: PROBNP, BNP  CMP    CMP 11/3/21 1/21/22 5/5/22   Glucose 121 (A) 105 (A) 123 (A)   BUN 18 13 13   Creatinine 0.89 1.05 0.84   eGFR Non African Am 85 70    Sodium 140 142 139   Potassium 4.5 4.8 4.3   Chloride 105 104 103   Calcium 9.4 9.8 9.3   Albumin 4.30  4.20   Total Bilirubin 1.3 (A)  1.2   Alkaline Phosphatase 61  63   AST (SGOT) 19  23   ALT (SGPT) 27  29   (A) Abnormal value            CBC w/diff    CBC w/Diff 1/21/22 2/28/22 5/5/22   WBC 9.44 9.81 10.24   RBC 5.24 4.92 5.15   Hemoglobin 15.8 14.5 15.7   Hematocrit 47.5 44.8 48.0   MCV 90.6 91.1 93.2   MCH 30.2 29.5 30.5   MCHC 33.3 32.4 32.7   RDW 12.6 13.3 12.8   Platelets 236 214 224   Neutrophil Rel % 47.1 52.5 49.3   Immature Granulocyte Rel % 0.3  0.5   Lymphocyte Rel % 41.6 37.5 39.9   Monocyte Rel % 7.6 5.8 6.2   Eosinophil Rel % 2.6 3.7 3.3   Basophil Rel % 0.8 0.5 0.8            Lipid Panel    Lipid Panel 11/3/21 5/5/22   Total Cholesterol 191 184   Triglycerides 127 143   HDL Cholesterol 56 60   VLDL Cholesterol 22 25   LDL Cholesterol  113 (A) 99   LDL/HDL Ratio 1.96 1.59   (A) Abnormal value             Lab Results   Component Value Date    TSH 3.080 05/05/2022    TSH 2.610 11/03/2021    TSH 2.020 11/16/2020      No results found for: FREET4   A1C Last 3 Results    HGBA1C Last 3 Results 11/3/21 5/5/22   Hemoglobin A1C 5.90 (A) 6.00 (A)   (A) Abnormal value             PSA    PSA 11/3/21 5/5/22   PSA <0.014 <0.014                          Visit Diagnoses:    ICD-10-CM ICD-9-CM   1. Mixed hyperlipidemia  E78.2 272.2   2. IFG (impaired fasting glucose)  R73.01 790.21   3. Hypertension, essential  I10 401.9   4. Prostate cancer (HCC)  C61 185   5. Class 1 obesity due to excess calories without serious comorbidity with body mass index (BMI) of 34.0 to 34.9 in adult  E66.09 278.00    Z68.34 V85.34   6. Bladder neck contracture  N32.0 596.0   7. Lymphoma of lymph  nodes in pelvis, unspecified lymphoma type (HCC)  C85.96 202.86       Assessment and Plan   Diagnoses and all orders for this visit:    1. Mixed hyperlipidemia (Primary)  -     Lipid Panel; Future  -     Hemoglobin A1c; Future  -     Comprehensive Metabolic Panel; Future  -     CBC & Differential; Future  -     Magnesium; Future  -     Comprehensive Metabolic Panel; Future    2. IFG (impaired fasting glucose)  -     Lipid Panel; Future  -     Hemoglobin A1c; Future  -     Comprehensive Metabolic Panel; Future  -     CBC & Differential; Future  -     Magnesium; Future  -     Comprehensive Metabolic Panel; Future    3. Hypertension, essential  -     Lipid Panel; Future  -     Hemoglobin A1c; Future  -     Comprehensive Metabolic Panel; Future  -     CBC & Differential; Future  -     Magnesium; Future  -     Comprehensive Metabolic Panel; Future    4. Prostate cancer (HCC)  -     Lipid Panel; Future  -     Hemoglobin A1c; Future  -     Comprehensive Metabolic Panel; Future  -     CBC & Differential; Future  -     Magnesium; Future  -     Comprehensive Metabolic Panel; Future    5. Class 1 obesity due to excess calories without serious comorbidity with body mass index (BMI) of 34.0 to 34.9 in adult  -     Lipid Panel; Future  -     Hemoglobin A1c; Future  -     Comprehensive Metabolic Panel; Future  -     CBC & Differential; Future  -     Magnesium; Future  -     Comprehensive Metabolic Panel; Future    6. Bladder neck contracture  -     Lipid Panel; Future  -     Hemoglobin A1c; Future  -     Comprehensive Metabolic Panel; Future  -     CBC & Differential; Future  -     Magnesium; Future  -     Comprehensive Metabolic Panel; Future    7. Lymphoma of lymph nodes in pelvis, unspecified lymphoma type (HCC)  -     Lipid Panel; Future  -     Hemoglobin A1c; Future  -     Comprehensive Metabolic Panel; Future  -     CBC & Differential; Future  -     Magnesium; Future  -     Comprehensive Metabolic Panel; Future    Other  orders  -     valsartan-hydrochlorothiazide (DIOVAN-HCT) 320-12.5 MG per tablet; Take 1 tablet by mouth Daily.  Dispense: 90 tablet; Refill: 3         Lower extremity edema,----- May 2022    COVID POSTIVE NOV 2020, LOST TASTE, SMELL, FEVER, ---BETTER NOV 18, 2020---COVID VACCINE AND BOOSTER UP TO DATE      TOTAL PROSTATECTOMY JAN 2021, WITH POSITIVE LYMPH NODES FOR LOW GRADE B CELL LYMPHOMA -----patient had a follow-up TURP for scar tissue removal, is now having to intermittently straight cath to avoid recurrence of scarring, as of May 2022 every 4 to 5 days,    ELEVATED WBC ,-- OCT 2019, --agus juarez does f/u aug 2020, -- white count fluctuates from 9-14,000 over the past 1 to 2 years, continues follows up with heme-onc,  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 --BETTER, DID SEE DR KEREN ESPINOZA, GREAT JOB 60 LB IN 1 YR SINCE AUG 2016,--now back up ---Discussed continued exercise weight loss, November 2020    IFG ,HGA1C = STABLE 6.1 MAY 2021 , DOWN 5.9 NOV 2021,     htn --continue QUINAPRIL 20 MG QD --- organ to change blood pressure medications to valsartan 320/25, patient is to monitor blood pressures, May 2022    elevated chol--continues Crestor 20 mg, and l co-Q10 to help with muscle aches if necessary,    psa per urology, ==6.2--BX POSITIVE CA,----UP TO 8.5 --BX --SEPT, 2020,------DR RIVERA==JAN 2021, TOTAL PROSTATECTOMY            Follow Up   Return in about 6 months (around 11/10/2022).  Patient was given instructions and counseling regarding his condition or for health maintenance advice. Please see specific information pulled into the AVS if appropriate.

## 2022-05-19 RX ORDER — ROSUVASTATIN CALCIUM 10 MG/1
TABLET, COATED ORAL
Qty: 90 TABLET | Refills: 1 | Status: SHIPPED | OUTPATIENT
Start: 2022-05-19 | End: 2022-11-17

## 2022-06-13 ENCOUNTER — LAB (OUTPATIENT)
Dept: LAB | Facility: HOSPITAL | Age: 72
End: 2022-06-13

## 2022-06-13 DIAGNOSIS — E78.2 MIXED HYPERLIPIDEMIA: Primary | ICD-10-CM

## 2022-06-13 DIAGNOSIS — R73.01 IFG (IMPAIRED FASTING GLUCOSE): ICD-10-CM

## 2022-06-13 DIAGNOSIS — E78.2 MIXED HYPERLIPIDEMIA: ICD-10-CM

## 2022-06-13 DIAGNOSIS — C85.96 LYMPHOMA OF LYMPH NODES IN PELVIS, UNSPECIFIED LYMPHOMA TYPE: ICD-10-CM

## 2022-06-13 DIAGNOSIS — C61 PROSTATE CANCER: ICD-10-CM

## 2022-06-13 DIAGNOSIS — N32.0 BLADDER NECK CONTRACTURE: ICD-10-CM

## 2022-06-13 DIAGNOSIS — E66.09 CLASS 1 OBESITY DUE TO EXCESS CALORIES WITHOUT SERIOUS COMORBIDITY WITH BODY MASS INDEX (BMI) OF 34.0 TO 34.9 IN ADULT: ICD-10-CM

## 2022-06-13 DIAGNOSIS — I10 HYPERTENSION, ESSENTIAL: ICD-10-CM

## 2022-06-13 LAB
ALBUMIN SERPL-MCNC: 4.2 G/DL (ref 3.5–5.2)
ALBUMIN/GLOB SERPL: 2.2 G/DL
ALP SERPL-CCNC: 59 U/L (ref 39–117)
ALT SERPL W P-5'-P-CCNC: 36 U/L (ref 1–41)
ANION GAP SERPL CALCULATED.3IONS-SCNC: 11 MMOL/L (ref 5–15)
AST SERPL-CCNC: 24 U/L (ref 1–40)
BASOPHILS # BLD AUTO: 0.12 10*3/MM3 (ref 0–0.2)
BASOPHILS NFR BLD AUTO: 0.9 % (ref 0–1.5)
BILIRUB SERPL-MCNC: 1.5 MG/DL (ref 0–1.2)
BUN SERPL-MCNC: 19 MG/DL (ref 8–23)
BUN/CREAT SERPL: 19.8 (ref 7–25)
CALCIUM SPEC-SCNC: 9.6 MG/DL (ref 8.6–10.5)
CHLORIDE SERPL-SCNC: 102 MMOL/L (ref 98–107)
CHOLEST SERPL-MCNC: 184 MG/DL (ref 0–200)
CO2 SERPL-SCNC: 27 MMOL/L (ref 22–29)
CREAT SERPL-MCNC: 0.96 MG/DL (ref 0.76–1.27)
DEPRECATED RDW RBC AUTO: 42.1 FL (ref 37–54)
EGFRCR SERPLBLD CKD-EPI 2021: 85.6 ML/MIN/1.73
EOSINOPHIL # BLD AUTO: 0.27 10*3/MM3 (ref 0–0.4)
EOSINOPHIL NFR BLD AUTO: 2 % (ref 0.3–6.2)
ERYTHROCYTE [DISTWIDTH] IN BLOOD BY AUTOMATED COUNT: 12.7 % (ref 12.3–15.4)
GLOBULIN UR ELPH-MCNC: 1.9 GM/DL
GLUCOSE SERPL-MCNC: 115 MG/DL (ref 65–99)
HBA1C MFR BLD: 6.3 % (ref 4.8–5.6)
HCT VFR BLD AUTO: 44 % (ref 37.5–51)
HDLC SERPL-MCNC: 61 MG/DL (ref 40–60)
HGB BLD-MCNC: 14.4 G/DL (ref 13–17.7)
IMM GRANULOCYTES # BLD AUTO: 0.05 10*3/MM3 (ref 0–0.05)
IMM GRANULOCYTES NFR BLD AUTO: 0.4 % (ref 0–0.5)
LDLC SERPL CALC-MCNC: 85 MG/DL (ref 0–100)
LDLC/HDLC SERPL: 1.26 {RATIO}
LYMPHOCYTES # BLD AUTO: 5.38 10*3/MM3 (ref 0.7–3.1)
LYMPHOCYTES NFR BLD AUTO: 39.6 % (ref 19.6–45.3)
MAGNESIUM SERPL-MCNC: 2.1 MG/DL (ref 1.6–2.4)
MCH RBC QN AUTO: 29.8 PG (ref 26.6–33)
MCHC RBC AUTO-ENTMCNC: 32.7 G/DL (ref 31.5–35.7)
MCV RBC AUTO: 90.9 FL (ref 79–97)
MONOCYTES # BLD AUTO: 0.97 10*3/MM3 (ref 0.1–0.9)
MONOCYTES NFR BLD AUTO: 7.1 % (ref 5–12)
NEUTROPHILS NFR BLD AUTO: 50 % (ref 42.7–76)
NEUTROPHILS NFR BLD AUTO: 6.78 10*3/MM3 (ref 1.7–7)
NRBC BLD AUTO-RTO: 0 /100 WBC (ref 0–0.2)
PLATELET # BLD AUTO: 261 10*3/MM3 (ref 140–450)
PMV BLD AUTO: 11.1 FL (ref 6–12)
POTASSIUM SERPL-SCNC: 4.6 MMOL/L (ref 3.5–5.2)
PROT SERPL-MCNC: 6.1 G/DL (ref 6–8.5)
RBC # BLD AUTO: 4.84 10*6/MM3 (ref 4.14–5.8)
SODIUM SERPL-SCNC: 140 MMOL/L (ref 136–145)
TRIGL SERPL-MCNC: 230 MG/DL (ref 0–150)
VLDLC SERPL-MCNC: 38 MG/DL (ref 5–40)
WBC NRBC COR # BLD: 13.57 10*3/MM3 (ref 3.4–10.8)

## 2022-06-13 PROCEDURE — 83735 ASSAY OF MAGNESIUM: CPT

## 2022-06-13 PROCEDURE — 80061 LIPID PANEL: CPT

## 2022-06-13 PROCEDURE — 85025 COMPLETE CBC W/AUTO DIFF WBC: CPT

## 2022-06-13 PROCEDURE — 83036 HEMOGLOBIN GLYCOSYLATED A1C: CPT

## 2022-06-13 PROCEDURE — 36415 COLL VENOUS BLD VENIPUNCTURE: CPT

## 2022-06-13 PROCEDURE — 80053 COMPREHEN METABOLIC PANEL: CPT

## 2022-09-13 ENCOUNTER — HOSPITAL ENCOUNTER (OUTPATIENT)
Dept: OTHER | Facility: HOSPITAL | Age: 72
Discharge: HOME | End: 2022-09-13

## 2022-11-09 ENCOUNTER — LAB (OUTPATIENT)
Dept: LAB | Facility: HOSPITAL | Age: 72
End: 2022-11-09

## 2022-11-09 DIAGNOSIS — R73.01 IFG (IMPAIRED FASTING GLUCOSE): ICD-10-CM

## 2022-11-09 DIAGNOSIS — C85.96 LYMPHOMA OF LYMPH NODES IN PELVIS, UNSPECIFIED LYMPHOMA TYPE: ICD-10-CM

## 2022-11-09 DIAGNOSIS — E78.2 MIXED HYPERLIPIDEMIA: ICD-10-CM

## 2022-11-09 LAB
ALBUMIN SERPL-MCNC: 4.1 G/DL (ref 3.5–5.2)
ALBUMIN/GLOB SERPL: 1.6 G/DL
ALP SERPL-CCNC: 58 U/L (ref 39–117)
ALT SERPL W P-5'-P-CCNC: 22 U/L (ref 1–41)
ANION GAP SERPL CALCULATED.3IONS-SCNC: 9.7 MMOL/L (ref 5–15)
AST SERPL-CCNC: 25 U/L (ref 1–40)
BASOPHILS # BLD AUTO: 0.09 10*3/MM3 (ref 0–0.2)
BASOPHILS NFR BLD AUTO: 0.9 % (ref 0–1.5)
BILIRUB SERPL-MCNC: 1.5 MG/DL (ref 0–1.2)
BUN SERPL-MCNC: 15 MG/DL (ref 8–23)
BUN/CREAT SERPL: 12.7 (ref 7–25)
CALCIUM SPEC-SCNC: 10 MG/DL (ref 8.6–10.5)
CHLORIDE SERPL-SCNC: 102 MMOL/L (ref 98–107)
CHOLEST SERPL-MCNC: 148 MG/DL (ref 0–200)
CO2 SERPL-SCNC: 28.3 MMOL/L (ref 22–29)
CREAT SERPL-MCNC: 1.18 MG/DL (ref 0.76–1.27)
DEPRECATED RDW RBC AUTO: 41.1 FL (ref 37–54)
EGFRCR SERPLBLD CKD-EPI 2021: 66.4 ML/MIN/1.73
EOSINOPHIL # BLD AUTO: 0.23 10*3/MM3 (ref 0–0.4)
EOSINOPHIL NFR BLD AUTO: 2.2 % (ref 0.3–6.2)
ERYTHROCYTE [DISTWIDTH] IN BLOOD BY AUTOMATED COUNT: 12.7 % (ref 12.3–15.4)
GLOBULIN UR ELPH-MCNC: 2.5 GM/DL
GLUCOSE SERPL-MCNC: 96 MG/DL (ref 65–99)
HBA1C MFR BLD: 6.1 % (ref 4.8–5.6)
HCT VFR BLD AUTO: 44.5 % (ref 37.5–51)
HDLC SERPL-MCNC: 56 MG/DL (ref 40–60)
HGB BLD-MCNC: 14.9 G/DL (ref 13–17.7)
IMM GRANULOCYTES # BLD AUTO: 0.03 10*3/MM3 (ref 0–0.05)
IMM GRANULOCYTES NFR BLD AUTO: 0.3 % (ref 0–0.5)
LDLC SERPL CALC-MCNC: 77 MG/DL (ref 0–100)
LDLC/HDLC SERPL: 1.36 {RATIO}
LYMPHOCYTES # BLD AUTO: 4.12 10*3/MM3 (ref 0.7–3.1)
LYMPHOCYTES NFR BLD AUTO: 39.6 % (ref 19.6–45.3)
MCH RBC QN AUTO: 30.1 PG (ref 26.6–33)
MCHC RBC AUTO-ENTMCNC: 33.5 G/DL (ref 31.5–35.7)
MCV RBC AUTO: 89.9 FL (ref 79–97)
MONOCYTES # BLD AUTO: 0.72 10*3/MM3 (ref 0.1–0.9)
MONOCYTES NFR BLD AUTO: 6.9 % (ref 5–12)
NEUTROPHILS NFR BLD AUTO: 5.21 10*3/MM3 (ref 1.7–7)
NEUTROPHILS NFR BLD AUTO: 50.1 % (ref 42.7–76)
NRBC BLD AUTO-RTO: 0 /100 WBC (ref 0–0.2)
PLATELET # BLD AUTO: 289 10*3/MM3 (ref 140–450)
PMV BLD AUTO: 11.9 FL (ref 6–12)
POTASSIUM SERPL-SCNC: 4.8 MMOL/L (ref 3.5–5.2)
PROT SERPL-MCNC: 6.6 G/DL (ref 6–8.5)
RBC # BLD AUTO: 4.95 10*6/MM3 (ref 4.14–5.8)
SODIUM SERPL-SCNC: 140 MMOL/L (ref 136–145)
TRIGL SERPL-MCNC: 79 MG/DL (ref 0–150)
VLDLC SERPL-MCNC: 15 MG/DL (ref 5–40)
WBC NRBC COR # BLD: 10.4 10*3/MM3 (ref 3.4–10.8)

## 2022-11-09 PROCEDURE — 85025 COMPLETE CBC W/AUTO DIFF WBC: CPT

## 2022-11-09 PROCEDURE — 80053 COMPREHEN METABOLIC PANEL: CPT

## 2022-11-09 PROCEDURE — 80061 LIPID PANEL: CPT

## 2022-11-09 PROCEDURE — 83036 HEMOGLOBIN GLYCOSYLATED A1C: CPT

## 2022-11-09 PROCEDURE — 36415 COLL VENOUS BLD VENIPUNCTURE: CPT

## 2022-11-16 ENCOUNTER — OFFICE VISIT (OUTPATIENT)
Dept: INTERNAL MEDICINE | Facility: CLINIC | Age: 72
End: 2022-11-16

## 2022-11-16 VITALS
OXYGEN SATURATION: 95 % | BODY MASS INDEX: 34.77 KG/M2 | SYSTOLIC BLOOD PRESSURE: 130 MMHG | DIASTOLIC BLOOD PRESSURE: 80 MMHG | HEIGHT: 68 IN | HEART RATE: 65 BPM | WEIGHT: 229.4 LBS | TEMPERATURE: 98.2 F

## 2022-11-16 DIAGNOSIS — N32.0 BLADDER NECK CONTRACTURE: ICD-10-CM

## 2022-11-16 DIAGNOSIS — C85.96 LYMPHOMA OF LYMPH NODES IN PELVIS, UNSPECIFIED LYMPHOMA TYPE: ICD-10-CM

## 2022-11-16 DIAGNOSIS — I10 HYPERTENSION, ESSENTIAL: ICD-10-CM

## 2022-11-16 DIAGNOSIS — E66.09 CLASS 1 OBESITY DUE TO EXCESS CALORIES WITHOUT SERIOUS COMORBIDITY WITH BODY MASS INDEX (BMI) OF 34.0 TO 34.9 IN ADULT: ICD-10-CM

## 2022-11-16 DIAGNOSIS — C61 PROSTATE CANCER: ICD-10-CM

## 2022-11-16 DIAGNOSIS — R73.01 IFG (IMPAIRED FASTING GLUCOSE): ICD-10-CM

## 2022-11-16 DIAGNOSIS — E78.2 MIXED HYPERLIPIDEMIA: Primary | ICD-10-CM

## 2022-11-16 DIAGNOSIS — M54.12 CERVICAL RADICULOPATHY AT C6: ICD-10-CM

## 2022-11-16 PROCEDURE — 99214 OFFICE O/P EST MOD 30 MIN: CPT | Performed by: INTERNAL MEDICINE

## 2022-11-16 NOTE — PROGRESS NOTES
"Chief Complaint/ HPI: Here for routine checkup as well as   to follow-up with blood pressure, weight issues, cholesterol history of lymphoma and prostate cancer, says he is doing well, staying very busy, here to review labs,  Still incontinent with urine , goes thru --4 pad/ day  Straight cath q 4 day     Back on diet/through [facility] wellness, on a low-carb heart healthy diet with supplements, losing weight again,        Objective   Vital Signs  Vitals:    11/16/22 1313   BP: 130/80   Pulse: 65   Temp: 98.2 °F (36.8 °C)   SpO2: 95%   Weight: 104 kg (229 lb 6.4 oz)   Height: 172.7 cm (67.99\")      Body mass index is 34.89 kg/m².  Review of Systems   Constitutional: Negative.    HENT: Negative.    Eyes: Negative.    Respiratory: Negative.    Cardiovascular: Negative.    Gastrointestinal: Negative.    Endocrine: Negative.    Genitourinary: Negative.    Musculoskeletal: Negative.    Allergic/Immunologic: Negative.    Neurological: Negative.    Hematological: Negative.    Psychiatric/Behavioral: Negative.       Physical Exam  Constitutional:       General: He is not in acute distress.     Appearance: Normal appearance. He is obese.   HENT:      Head: Normocephalic.      Mouth/Throat:      Mouth: Mucous membranes are moist.   Eyes:      Conjunctiva/sclera: Conjunctivae normal.      Pupils: Pupils are equal, round, and reactive to light.   Cardiovascular:      Rate and Rhythm: Normal rate and regular rhythm.      Pulses: Normal pulses.      Heart sounds: Normal heart sounds.   Pulmonary:      Effort: Pulmonary effort is normal.      Breath sounds: Normal breath sounds.   Abdominal:      General: Bowel sounds are normal.      Palpations: Abdomen is soft.   Musculoskeletal:         General: No swelling. Normal range of motion.      Cervical back: Neck supple.   Skin:     General: Skin is warm and dry.      Coloration: Skin is not jaundiced.   Neurological:      General: No focal deficit present.      Mental Status: " He is alert and oriented to person, place, and time. Mental status is at baseline.   Psychiatric:         Mood and Affect: Mood normal.         Behavior: Behavior normal.         Thought Content: Thought content normal.         Judgment: Judgment normal.        Result Review :   No results found for: PROBNP, BNP  CMP    CMP 5/5/22 6/13/22 11/9/22   Glucose 123 (A) 115 (A) 96   BUN 13 19 15   Creatinine 0.84 0.96 1.18   Sodium 139 140 140   Potassium 4.3 4.6 4.8   Chloride 103 102 102   Calcium 9.3 9.6 10.0   Albumin 4.20 4.20 4.10   Total Bilirubin 1.2 1.5 (A) 1.5 (A)   Alkaline Phosphatase 63 59 58   AST (SGOT) 23 24 25   ALT (SGPT) 29 36 22   (A) Abnormal value            CBC w/diff    CBC w/Diff 5/5/22 6/13/22 11/9/22   WBC 10.24 13.57 (A) 10.40   RBC 5.15 4.84 4.95   Hemoglobin 15.7 14.4 14.9   Hematocrit 48.0 44.0 44.5   MCV 93.2 90.9 89.9   MCH 30.5 29.8 30.1   MCHC 32.7 32.7 33.5   RDW 12.8 12.7 12.7   Platelets 224 261 289   Neutrophil Rel % 49.3 50.0 50.1   Immature Granulocyte Rel % 0.5 0.4 0.3   Lymphocyte Rel % 39.9 39.6 39.6   Monocyte Rel % 6.2 7.1 6.9   Eosinophil Rel % 3.3 2.0 2.2   Basophil Rel % 0.8 0.9 0.9   (A) Abnormal value             Lipid Panel    Lipid Panel 5/5/22 6/13/22 11/9/22   Total Cholesterol 184 184 148   Triglycerides 143 230 (A) 79   HDL Cholesterol 60 61 (A) 56   VLDL Cholesterol 25 38 15   LDL Cholesterol  99 85 77   LDL/HDL Ratio 1.59 1.26 1.36   (A) Abnormal value             Lab Results   Component Value Date    TSH 3.080 05/05/2022    TSH 2.610 11/03/2021    TSH 2.020 11/16/2020      No results found for: FREET4   A1C Last 3 Results    HGBA1C Last 3 Results 5/5/22 6/13/22 11/9/22   Hemoglobin A1C 6.00 (A) 6.30 (A) 6.10 (A)   (A) Abnormal value             PSA    PSA 5/5/22   PSA <0.014                          Visit Diagnoses:    ICD-10-CM ICD-9-CM   1. Mixed hyperlipidemia  E78.2 272.2   2. Lymphoma of lymph nodes in pelvis, unspecified lymphoma type (HCC)  C85.96 202.86    3. Hypertension, essential  I10 401.9   4. Bladder neck contracture  N32.0 596.0   5. Class 1 obesity due to excess calories without serious comorbidity with body mass index (BMI) of 34.0 to 34.9 in adult  E66.09 278.00    Z68.34 V85.34   6. Prostate cancer (HCC)  C61 185   7. IFG (impaired fasting glucose)  R73.01 790.21   8. Cervical radiculopathy at C6  M54.12 723.4       Assessment and Plan   Diagnoses and all orders for this visit:    1. Mixed hyperlipidemia (Primary)  -     Lipid Panel; Future  -     Hemoglobin A1c; Future  -     Comprehensive Metabolic Panel; Future  -     CBC & Differential; Future  -     TSH+Free T4; Future  -     Sedimentation Rate; Future  -     PSA DIAGNOSTIC; Future  -     Bilirubin, Total & Direct; Future    2. Lymphoma of lymph nodes in pelvis, unspecified lymphoma type (HCC)  -     Lipid Panel; Future  -     Hemoglobin A1c; Future  -     Comprehensive Metabolic Panel; Future  -     CBC & Differential; Future  -     TSH+Free T4; Future  -     Sedimentation Rate; Future  -     PSA DIAGNOSTIC; Future  -     Bilirubin, Total & Direct; Future    3. Hypertension, essential  -     Lipid Panel; Future  -     Hemoglobin A1c; Future  -     Comprehensive Metabolic Panel; Future  -     CBC & Differential; Future  -     TSH+Free T4; Future  -     Sedimentation Rate; Future  -     PSA DIAGNOSTIC; Future  -     Bilirubin, Total & Direct; Future    4. Bladder neck contracture  -     Lipid Panel; Future  -     Hemoglobin A1c; Future  -     Comprehensive Metabolic Panel; Future  -     CBC & Differential; Future  -     TSH+Free T4; Future  -     Sedimentation Rate; Future  -     PSA DIAGNOSTIC; Future  -     Bilirubin, Total & Direct; Future    5. Class 1 obesity due to excess calories without serious comorbidity with body mass index (BMI) of 34.0 to 34.9 in adult  -     Lipid Panel; Future  -     Hemoglobin A1c; Future  -     Comprehensive Metabolic Panel; Future  -     CBC & Differential; Future  -      TSH+Free T4; Future  -     Sedimentation Rate; Future  -     PSA DIAGNOSTIC; Future  -     Bilirubin, Total & Direct; Future    6. Prostate cancer (HCC)  -     Lipid Panel; Future  -     Hemoglobin A1c; Future  -     Comprehensive Metabolic Panel; Future  -     CBC & Differential; Future  -     TSH+Free T4; Future  -     Sedimentation Rate; Future  -     PSA DIAGNOSTIC; Future  -     Bilirubin, Total & Direct; Future    7. IFG (impaired fasting glucose)  -     Lipid Panel; Future  -     Hemoglobin A1c; Future  -     Comprehensive Metabolic Panel; Future  -     CBC & Differential; Future  -     TSH+Free T4; Future  -     Sedimentation Rate; Future  -     PSA DIAGNOSTIC; Future  -     Bilirubin, Total & Direct; Future    8. Cervical radiculopathy at C6  -     Lipid Panel; Future  -     Hemoglobin A1c; Future  -     Comprehensive Metabolic Panel; Future  -     CBC & Differential; Future  -     TSH+Free T4; Future  -     Sedimentation Rate; Future  -     PSA DIAGNOSTIC; Future  -     Bilirubin, Total & Direct; Future    Lower extremity edema,----- May 2022--resolved nov 2022    shingrix completed nov 2022    Pneumovax/ flu shot -- done sept 2022    Low-grade B-cell lymphoma, CT of the chest September 2022 shows no evidence of recurrence of lymphoma in the chest area, CT abdomen and pelvis shows no convincing evidence of recurrent lymphoma in the abdomen and pelvis, cholelithiasis, noted, discussed with patient November 16, 2022 continues follow-up    COVID POSTANABELLE NOV 2020,      TOTAL PROSTATECTOMY JAN 2021, WITH POSITIVE LYMPH NODES FOR LOW GRADE B CELL LYMPHOMA -----patient had a follow-up TURP for scar tissue removal, is now having to intermittently straight cath to avoid recurrence of scarring, as of May 2022 every 4 to 5 days,, continues November 2022    ELEVATED WBC ,-- OCT 2019, --agus juarez does f/u -- white count fluctuates from 9-14,000 -- diagnosis of low-grade B-cell lymphoma,, found on prostatectomy  with lymph node dissection,, diagnosed January 2021,, currently no treatment following clinically, current white count down to 10.4 November 9, 2022    Right neck pain, with radiculopathy in the C6-C7 possibly C5-C6 distribution, --- MRI, neurosurgical referral, physical therapy for possible traction,. --BETTER, DID SEE DR VELIZ currently not an issue November 2022     OVERWGT, [facility] JOB 60 LB IN 1 YR SINCE AUG 2016,--now back up ---Discussed continued exercise weight loss, November 2022    IFG ,HGA1C = 6.1, down slightly November 2022 continue weight loss efforts, great job with weight loss November 2022,    Elevated bilirubin most likely [place] isolated, previously elevated at 1.3 in the past several years,    htn --continue valsartan 320/25 milligrams daily    elevated chol--continues Crestor 20 mg, and l co-Q10     psa per urology, ==6.2--BX POSITIVE CA,----UP TO 8.5 --BX --SEPT, 2020,------DR RIVERA==JAN 2021, TOTAL PROSTATECTOMY    Follow Up   Return in about 6 months (around 5/16/2023).  Patient was given instructions and counseling regarding his condition or for health maintenance advice. Please see specific information pulled into the AVS if appropriate.         Answers for HPI/ROS submitted by the patient on 11/9/2022  What is the primary reason for your visit?: Physical

## 2022-11-17 RX ORDER — ROSUVASTATIN CALCIUM 10 MG/1
TABLET, COATED ORAL
Qty: 90 TABLET | Refills: 1 | Status: SHIPPED | OUTPATIENT
Start: 2022-11-17

## 2023-01-23 ENCOUNTER — TELEPHONE (OUTPATIENT)
Dept: INTERNAL MEDICINE | Facility: CLINIC | Age: 73
End: 2023-01-23

## 2023-01-23 ENCOUNTER — TELEMEDICINE (OUTPATIENT)
Dept: INTERNAL MEDICINE | Facility: CLINIC | Age: 73
End: 2023-01-23
Payer: COMMERCIAL

## 2023-01-23 DIAGNOSIS — U07.1 COVID-19 VIRUS INFECTION: Primary | ICD-10-CM

## 2023-01-23 PROCEDURE — 99213 OFFICE O/P EST LOW 20 MIN: CPT | Performed by: INTERNAL MEDICINE

## 2023-01-23 NOTE — TELEPHONE ENCOUNTER
Put patient on the schedule for today to discuss treatment options tell him I will call him at the end of the day

## 2023-01-23 NOTE — TELEPHONE ENCOUNTER
Caller: Uriah Perez    Relationship to patient: Self    Best call back number: [phone]    Patient is needing: PATIENT TESTED COVID POSITIVE THIS MORNING. HE IS INQUIRING ABOUT NEXT STEPS AND WOULD LIKE A NURSE TO CALL BACK TO ADVISE.

## 2023-01-23 NOTE — PROGRESS NOTES
Patient is here for telehealth visit, consents to visit, patient is being seen by audio and video     the patient is being seen at his house in [facility] and the provider is at his office in  [place] next to the hospital where I do 100% of my telehealth visits    CHIEF COMPLAINT:  No chief complaint on file.  Felt weak and chills  Temp 100 . 3  Tested positive --- this morning for COVID through a home test, no nausea vomiting or diarrhea,        Objective   Vital Signs  There were no vitals filed for this visit.   There is no height or weight on file to calculate BMI.  Review of Systems --- general malaise, fevers chills,  Physical Exam patient is alert and oriented x3 on telehealth, no rash on the face, he is talkative alert pleasant  Result Review :   No results found for: PROBNP, BNP  CMP    CMP 5/5/22 6/13/22 11/9/22   Glucose 123 (A) 115 (A) 96   BUN 13 19 15   Creatinine 0.84 0.96 1.18   eGFR 94.4 85.6 66.4   Sodium 139 140 140   Potassium 4.3 4.6 4.8   Chloride 103 102 102   Calcium 9.3 9.6 10.0   Total Protein 6.3 6.1 6.6   Albumin 4.20 4.20 4.10   Globulin 2.1 1.9 2.5   Total Bilirubin 1.2 1.5 (A) 1.5 (A)   Alkaline Phosphatase 63 59 58   AST (SGOT) 23 24 25   ALT (SGPT) 29 36 22   Albumin/Globulin Ratio 2.0 2.2 1.6   BUN/Creatinine Ratio 15.5 19.8 12.7   Anion Gap 9.0 11.0 9.7   (A) Abnormal value       Comments are available for some flowsheets but are not being displayed.           CBC w/diff    CBC w/Diff 5/5/22 6/13/22 11/9/22   WBC 10.24 13.57 (A) 10.40   RBC 5.15 4.84 4.95   Hemoglobin 15.7 14.4 14.9   Hematocrit 48.0 44.0 44.5   MCV 93.2 90.9 89.9   MCH 30.5 29.8 30.1   MCHC 32.7 32.7 33.5   RDW 12.8 12.7 12.7   Platelets 224 261 289   Neutrophil Rel % 49.3 50.0 50.1   Immature Granulocyte Rel % 0.5 0.4 0.3   Lymphocyte Rel % 39.9 39.6 39.6   Monocyte Rel % 6.2 7.1 6.9   Eosinophil Rel % 3.3 2.0 2.2   Basophil Rel % 0.8 0.9 0.9   (A) Abnormal value             Lipid Panel    Lipid  Panel 5/5/22 6/13/22 11/9/22   Total Cholesterol 184 184 148   Triglycerides 143 230 (A) 79   HDL Cholesterol 60 61 (A) 56   VLDL Cholesterol 25 38 15   LDL Cholesterol  99 85 77   LDL/HDL Ratio 1.59 1.26 1.36   (A) Abnormal value             Lab Results   Component Value Date    TSH 3.080 05/05/2022    TSH 2.610 11/03/2021    TSH 2.020 11/16/2020      No results found for: FREET4   A1C Last 3 Results    HGBA1C Last 3 Results 5/5/22 6/13/22 11/9/22   Hemoglobin A1C 6.00 (A) 6.30 (A) 6.10 (A)   (A) Abnormal value             PSA    PSA 5/5/22   PSA <0.014                          Visit Diagnoses:    ICD-10-CM ICD-9-CM   1. COVID-19 virus infection  U07.1 079.89       Assessment and Plan   Diagnoses and all orders for this visit:    1. COVID-19 virus infection (Primary)    Other orders  -     Nirmatrelvir&Ritonavir 300/100 (PAXLOVID) 20 x 150 MG & 10 x 100MG tablet therapy pack tablet; Take 3 tablets by mouth 2 (Two) Times a Day for 5 days.  Dispense: 30 tablet; Refill: 0        COVID-positive infection, with symptoms, treatment options discussed, discussed with patient about holding his Crestor for 7 days, he can continue using Tylenol mainly for fevers chills drinking plenty of fluids and rest isolate for 5 days until his symptoms have improved her fevers have stopped, discussed briefly about rebound phenomenon with the paxlovid after he stopped it and is feeling better there is potential that he could come back symptom wise 3 to 4 days later and he is to let me know    Previous visit,  Lower extremity edema,----- May 2022--resolved nov 2022    shingrix completed nov 2022    Pneumovax/ flu shot -- done sept 2022    Low-grade B-cell lymphoma, CT of the chest September 2022 shows no evidence of recurrence of lymphoma in the chest area, CT abdomen and pelvis shows no convincing evidence of recurrent lymphoma in the abdomen and pelvis, cholelithiasis, noted, discussed with patient November 16, 2022 continues  follow-up    COVID POSTIVE NOV 2020,      TOTAL PROSTATECTOMY JAN 2021, WITH POSITIVE LYMPH NODES FOR LOW GRADE B CELL LYMPHOMA -----patient had a follow-up TURP for scar tissue removal, is now having to intermittently straight cath to avoid recurrence of scarring, as of May 2022 every 4 to 5 days,, continues November 2022    ELEVATED WBC ,-- OCT 2019, --agus juarez does f/u -- white count fluctuates from 9-14,000 -- diagnosis of low-grade B-cell lymphoma,, found on prostatectomy with lymph node dissection,, diagnosed January 2021,, currently no treatment following clinically, current white count down to 10.4 November 9, 2022    Right neck pain, with radiculopathy in the C6-C7 possibly C5-C6 distribution, --- MRI, neurosurgical referral, physical therapy for possible traction,. --BETTER, DID SEE DR VELIZ currently not an issue November 2022     OVERWGT, [facility] 60 LB IN 1 YR SINCE AUG 2016,--now back up ---Discussed continued exercise weight loss, November 2022    IFG ,HGA1C = 6.1, down slightly November 2022 continue weight loss efforts, great job with weight loss November 2022,    Elevated bilirubin most likely [place] --- isolated, previously elevated at 1.3 in the past several years,    htn --continue valsartan 320/25 milligrams daily    elevated chol--continues Crestor 20 mg, and l co-Q10     psa per urology, ==6.2--BX POSITIVE CA,----UP TO 8.5 --BX --SEPT, 2020,------DR RIVERA==JAN 2021, TOTAL PROSTATECTOMY          Follow Up   No follow-ups on file.  Patient was given instructions and counseling regarding his condition or for health maintenance advice. Please see specific information pulled into the AVS if appropriate.

## 2023-05-03 RX ORDER — VALSARTAN AND HYDROCHLOROTHIAZIDE 320; 12.5 MG/1; MG/1
TABLET, FILM COATED ORAL
Qty: 90 TABLET | Refills: 3 | Status: SHIPPED | OUTPATIENT
Start: 2023-05-03

## 2023-05-09 ENCOUNTER — LAB (OUTPATIENT)
Dept: LAB | Facility: HOSPITAL | Age: 73
End: 2023-05-09
Payer: COMMERCIAL

## 2023-05-09 DIAGNOSIS — E66.09 CLASS 1 OBESITY DUE TO EXCESS CALORIES WITHOUT SERIOUS COMORBIDITY WITH BODY MASS INDEX (BMI) OF 34.0 TO 34.9 IN ADULT: ICD-10-CM

## 2023-05-09 DIAGNOSIS — N32.0 BLADDER NECK CONTRACTURE: ICD-10-CM

## 2023-05-09 DIAGNOSIS — M54.12 CERVICAL RADICULOPATHY AT C6: ICD-10-CM

## 2023-05-09 DIAGNOSIS — C61 PROSTATE CANCER: ICD-10-CM

## 2023-05-09 DIAGNOSIS — E78.2 MIXED HYPERLIPIDEMIA: ICD-10-CM

## 2023-05-09 DIAGNOSIS — I10 HYPERTENSION, ESSENTIAL: ICD-10-CM

## 2023-05-09 DIAGNOSIS — C85.96 LYMPHOMA OF LYMPH NODES IN PELVIS, UNSPECIFIED LYMPHOMA TYPE: ICD-10-CM

## 2023-05-09 DIAGNOSIS — R73.01 IFG (IMPAIRED FASTING GLUCOSE): ICD-10-CM

## 2023-05-09 LAB
ALBUMIN SERPL-MCNC: 4.2 G/DL (ref 3.5–5.2)
ALBUMIN/GLOB SERPL: 2 G/DL
ALP SERPL-CCNC: 50 U/L (ref 39–117)
ALT SERPL W P-5'-P-CCNC: 23 U/L (ref 1–41)
ANION GAP SERPL CALCULATED.3IONS-SCNC: 7.5 MMOL/L (ref 5–15)
AST SERPL-CCNC: 16 U/L (ref 1–40)
BASOPHILS # BLD AUTO: 0.11 10*3/MM3 (ref 0–0.2)
BASOPHILS NFR BLD AUTO: 1.1 % (ref 0–1.5)
BILIRUB CONJ SERPL-MCNC: <0.2 MG/DL (ref 0–0.3)
BILIRUB SERPL-MCNC: 1.1 MG/DL (ref 0–1.2)
BUN SERPL-MCNC: 17 MG/DL (ref 8–23)
BUN/CREAT SERPL: 18.3 (ref 7–25)
CALCIUM SPEC-SCNC: 9.9 MG/DL (ref 8.6–10.5)
CHLORIDE SERPL-SCNC: 106 MMOL/L (ref 98–107)
CHOLEST SERPL-MCNC: 176 MG/DL (ref 0–200)
CO2 SERPL-SCNC: 29.5 MMOL/L (ref 22–29)
CREAT SERPL-MCNC: 0.93 MG/DL (ref 0.76–1.27)
DEPRECATED RDW RBC AUTO: 41.7 FL (ref 37–54)
EGFRCR SERPLBLD CKD-EPI 2021: 88.3 ML/MIN/1.73
EOSINOPHIL # BLD AUTO: 0.28 10*3/MM3 (ref 0–0.4)
EOSINOPHIL NFR BLD AUTO: 2.8 % (ref 0.3–6.2)
ERYTHROCYTE [DISTWIDTH] IN BLOOD BY AUTOMATED COUNT: 12.5 % (ref 12.3–15.4)
ERYTHROCYTE [SEDIMENTATION RATE] IN BLOOD: 1 MM/HR (ref 0–20)
GLOBULIN UR ELPH-MCNC: 2.1 GM/DL
GLUCOSE SERPL-MCNC: 112 MG/DL (ref 65–99)
HBA1C MFR BLD: 5.7 % (ref 4.8–5.6)
HCT VFR BLD AUTO: 44.3 % (ref 37.5–51)
HDLC SERPL-MCNC: 65 MG/DL (ref 40–60)
HGB BLD-MCNC: 14.9 G/DL (ref 13–17.7)
IMM GRANULOCYTES # BLD AUTO: 0.03 10*3/MM3 (ref 0–0.05)
IMM GRANULOCYTES NFR BLD AUTO: 0.3 % (ref 0–0.5)
LDLC SERPL CALC-MCNC: 95 MG/DL (ref 0–100)
LDLC/HDLC SERPL: 1.44 {RATIO}
LYMPHOCYTES # BLD AUTO: 4.19 10*3/MM3 (ref 0.7–3.1)
LYMPHOCYTES NFR BLD AUTO: 41.9 % (ref 19.6–45.3)
MCH RBC QN AUTO: 30.2 PG (ref 26.6–33)
MCHC RBC AUTO-ENTMCNC: 33.6 G/DL (ref 31.5–35.7)
MCV RBC AUTO: 89.9 FL (ref 79–97)
MONOCYTES # BLD AUTO: 0.7 10*3/MM3 (ref 0.1–0.9)
MONOCYTES NFR BLD AUTO: 7 % (ref 5–12)
NEUTROPHILS NFR BLD AUTO: 4.7 10*3/MM3 (ref 1.7–7)
NEUTROPHILS NFR BLD AUTO: 46.9 % (ref 42.7–76)
NRBC BLD AUTO-RTO: 0 /100 WBC (ref 0–0.2)
PLATELET # BLD AUTO: 208 10*3/MM3 (ref 140–450)
PMV BLD AUTO: 10.9 FL (ref 6–12)
POTASSIUM SERPL-SCNC: 4.5 MMOL/L (ref 3.5–5.2)
PROT SERPL-MCNC: 6.3 G/DL (ref 6–8.5)
PSA SERPL-MCNC: <0.014 NG/ML (ref 0–4)
RBC # BLD AUTO: 4.93 10*6/MM3 (ref 4.14–5.8)
SODIUM SERPL-SCNC: 143 MMOL/L (ref 136–145)
T4 FREE SERPL-MCNC: 1.1 NG/DL (ref 0.93–1.7)
TRIGL SERPL-MCNC: 87 MG/DL (ref 0–150)
TSH SERPL DL<=0.05 MIU/L-ACNC: 3.45 UIU/ML (ref 0.27–4.2)
VLDLC SERPL-MCNC: 16 MG/DL (ref 5–40)
WBC NRBC COR # BLD: 10.01 10*3/MM3 (ref 3.4–10.8)

## 2023-05-09 PROCEDURE — 84443 ASSAY THYROID STIM HORMONE: CPT

## 2023-05-09 PROCEDURE — 84153 ASSAY OF PSA TOTAL: CPT

## 2023-05-09 PROCEDURE — 84439 ASSAY OF FREE THYROXINE: CPT

## 2023-05-09 PROCEDURE — 85025 COMPLETE CBC W/AUTO DIFF WBC: CPT

## 2023-05-09 PROCEDURE — 36415 COLL VENOUS BLD VENIPUNCTURE: CPT

## 2023-05-09 PROCEDURE — 85652 RBC SED RATE AUTOMATED: CPT

## 2023-05-09 PROCEDURE — 83036 HEMOGLOBIN GLYCOSYLATED A1C: CPT

## 2023-05-09 PROCEDURE — 80053 COMPREHEN METABOLIC PANEL: CPT

## 2023-05-09 PROCEDURE — 82248 BILIRUBIN DIRECT: CPT

## 2023-05-09 PROCEDURE — 80061 LIPID PANEL: CPT

## 2023-05-17 ENCOUNTER — OFFICE VISIT (OUTPATIENT)
Dept: INTERNAL MEDICINE | Facility: CLINIC | Age: 73
End: 2023-05-17
Payer: COMMERCIAL

## 2023-05-17 VITALS
WEIGHT: 221.2 LBS | HEIGHT: 68 IN | SYSTOLIC BLOOD PRESSURE: 130 MMHG | HEART RATE: 63 BPM | OXYGEN SATURATION: 94 % | BODY MASS INDEX: 33.52 KG/M2 | TEMPERATURE: 99.6 F | DIASTOLIC BLOOD PRESSURE: 80 MMHG

## 2023-05-17 DIAGNOSIS — E66.09 CLASS 1 OBESITY DUE TO EXCESS CALORIES WITHOUT SERIOUS COMORBIDITY WITH BODY MASS INDEX (BMI) OF 34.0 TO 34.9 IN ADULT: ICD-10-CM

## 2023-05-17 DIAGNOSIS — R73.01 IFG (IMPAIRED FASTING GLUCOSE): ICD-10-CM

## 2023-05-17 DIAGNOSIS — C85.96 LYMPHOMA OF LYMPH NODES IN PELVIS, UNSPECIFIED LYMPHOMA TYPE: ICD-10-CM

## 2023-05-17 DIAGNOSIS — R39.81 FUNCTIONAL URINARY INCONTINENCE: ICD-10-CM

## 2023-05-17 DIAGNOSIS — E78.2 MIXED HYPERLIPIDEMIA: Primary | ICD-10-CM

## 2023-05-17 DIAGNOSIS — C61 PROSTATE CANCER: ICD-10-CM

## 2023-05-17 DIAGNOSIS — I10 HYPERTENSION, ESSENTIAL: ICD-10-CM

## 2023-05-17 PROCEDURE — 99214 OFFICE O/P EST MOD 30 MIN: CPT | Performed by: INTERNAL MEDICINE

## 2023-05-17 NOTE — PROGRESS NOTES
"Answers for HPI/ROS submitted by the patient on 5/10/2023  What is the primary reason for your visit?: Physical    Answers for HPI/ROS submitted by the patient on 5/10/2023  What is the primary reason for your visit?: Physical    CHIEF COMPLAINT/ HPI:  Hyperlipidemia and Follow-up (Patient is here for a routine follow up)    Patient is here to follow-up with his blood sugars blood pressure says he is doing well he is lost quite a bit of weight almost 30 pounds since a year ago, he is getting ready to have upcoming urethral incontinence surgery in July with urology in [place], he still following up with Dr. Christianson occasionally in [place] for his lymphoma diagnosis, he is moving back to [place] this coming month, he staying active,          Objective   Vital Signs  Vitals:    05/17/23 1056   BP: 130/80   Pulse: 63   Temp: 99.6 °F (37.6 °C)   SpO2: 94%   Weight: 100 kg (221 lb 3.2 oz)   Height: 172.7 cm (67.99\")      Body mass index is 33.64 kg/m².  Review of Systems   Constitutional: Negative.    HENT: Negative.    Eyes: Negative.    Respiratory: Negative.    Cardiovascular: Negative.    Gastrointestinal: Negative.    Endocrine: Negative.    Genitourinary: Negative.    Musculoskeletal: Negative.    Allergic/Immunologic: Negative.    Neurological: Negative.    Hematological: Negative.    Psychiatric/Behavioral: Negative.       Physical Exam  Constitutional:       General: He is not in acute distress.     Appearance: Normal appearance. He is obese.   HENT:      Head: Normocephalic.      Mouth/Throat:      Mouth: Mucous membranes are moist.   Eyes:      Conjunctiva/sclera: Conjunctivae normal.      Pupils: Pupils are equal, round, and reactive to light.   Cardiovascular:      Rate and Rhythm: Normal rate and regular rhythm.      Pulses: Normal pulses.      Heart sounds: Normal heart sounds.   Pulmonary:      Effort: Pulmonary effort is normal.      Breath sounds: Normal breath sounds.   Abdominal:      " General: Bowel sounds are normal.      Palpations: Abdomen is soft.   Musculoskeletal:         General: No swelling. Normal range of motion.      Cervical back: Neck supple.   Skin:     General: Skin is warm and dry.      Coloration: Skin is not jaundiced.   Neurological:      General: No focal deficit present.      Mental Status: He is alert and oriented to person, place, and time. Mental status is at baseline.   Psychiatric:         Mood and Affect: Mood normal.         Behavior: Behavior normal.         Thought Content: Thought content normal.         Judgment: Judgment normal.     Patient is little bit of a red rash pinkish rash on the right lower leg and some venous confluence on the left medial lower leg just below the knee area,  Result Review :   No results found for: PROBNP, BNP  CMP        6/13/2022    10:31 11/9/2022    08:48 5/9/2023    10:19   CMP   Glucose 115   96   112     BUN 19   15   17     Creatinine 0.96   1.18   0.93     EGFR 85.6   66.4   88.3     Sodium 140   140   143     Potassium 4.6   4.8   4.5     Chloride 102   102   106     Calcium 9.6   10.0   9.9     Total Protein 6.1   6.6   6.3     Albumin 4.20   4.10   4.2     Globulin 1.9   2.5   2.1     Total Bilirubin 1.5   1.5   1.1     Alkaline Phosphatase 59   58   50     AST (SGOT) 24   25   16     ALT (SGPT) 36   22   23     Albumin/Globulin Ratio 2.2   1.6   2.0     BUN/Creatinine Ratio 19.8   12.7   18.3     Anion Gap 11.0   9.7   7.5       CBC w/diff        8/29/2022    11:40 11/9/2022    08:48 5/9/2023    10:19   CBC w/Diff   WBC 11.30      10.40   10.01     RBC 4.91      4.95   4.93     Hemoglobin 14.8      14.9   14.9     Hematocrit 45.2      44.5   44.3     MCV 92.1      89.9   89.9     MCH 30.1      30.1   30.2     MCHC 32.7      33.5   33.6     RDW 13.3      12.7   12.5     Platelets 215      289   208     Neutrophil Rel % 49.3      50.1   46.9     Immature Granulocyte Rel %  0.3   0.3     Lymphocyte Rel % 39.0      39.6   41.9      Monocyte Rel % 7.6      6.9   7.0     Eosinophil Rel % 3.8      2.2   2.8     Basophil Rel % 0.3      0.9   1.1         This result is from an external source.      Lipid Panel        6/13/2022    10:31 11/9/2022    08:48 5/9/2023    10:19   Lipid Panel   Total Cholesterol 184   148   176     Triglycerides 230   79   87     HDL Cholesterol 61   56   65     VLDL Cholesterol 38   15   16     LDL Cholesterol  85   77   95     LDL/HDL Ratio 1.26   1.36   1.44        Lab Results   Component Value Date    TSH 3.450 05/09/2023    TSH 3.080 05/05/2022    TSH 2.610 11/03/2021      Lab Results   Component Value Date    FREET4 1.10 05/09/2023      A1C Last 3 Results        6/13/2022    10:31 11/9/2022    08:48 5/9/2023    10:19   HGBA1C Last 3 Results   Hemoglobin A1C 6.30   6.10   5.70        PSA        5/9/2023    10:19   PSA   PSA <0.014                      Visit Diagnoses:    ICD-10-CM ICD-9-CM   1. Mixed hyperlipidemia  E78.2 272.2   2. IFG (impaired fasting glucose)  R73.01 790.21   3. Prostate cancer  C61 185   4. Hypertension, essential  I10 401.9   5. Lymphoma of lymph nodes in pelvis, unspecified lymphoma type  C85.96 202.86   6. Class 1 obesity due to excess calories without serious comorbidity with body mass index (BMI) of 34.0 to 34.9 in adult  E66.09 278.00    Z68.34 V85.34   7. Functional urinary incontinence  R39.81 788.91       Assessment and Plan   Diagnoses and all orders for this visit:    1. Mixed hyperlipidemia (Primary)  -     Comprehensive Metabolic Panel; Future  -     CBC & Differential; Future  -     Lipid Panel; Future  -     Hemoglobin A1c; Future  -     MicroAlbumin, Urine, Random - Urine, Clean Catch; Future  -     Ambulatory Referral to Gastroenterology    2. IFG (impaired fasting glucose)  -     Comprehensive Metabolic Panel; Future  -     CBC & Differential; Future  -     Lipid Panel; Future  -     Hemoglobin A1c; Future  -     MicroAlbumin, Urine, Random - Urine, Clean Catch; Future  -      Ambulatory Referral to Gastroenterology    3. Prostate cancer  -     Comprehensive Metabolic Panel; Future  -     CBC & Differential; Future  -     Lipid Panel; Future  -     Hemoglobin A1c; Future  -     MicroAlbumin, Urine, Random - Urine, Clean Catch; Future  -     Ambulatory Referral to Gastroenterology    4. Hypertension, essential  -     Comprehensive Metabolic Panel; Future  -     CBC & Differential; Future  -     Lipid Panel; Future  -     Hemoglobin A1c; Future  -     MicroAlbumin, Urine, Random - Urine, Clean Catch; Future  -     Ambulatory Referral to Gastroenterology    5. Lymphoma of lymph nodes in pelvis, unspecified lymphoma type  -     Comprehensive Metabolic Panel; Future  -     CBC & Differential; Future  -     Lipid Panel; Future  -     Hemoglobin A1c; Future  -     MicroAlbumin, Urine, Random - Urine, Clean Catch; Future  -     Ambulatory Referral to Gastroenterology    6. Class 1 obesity due to excess calories without serious comorbidity with body mass index (BMI) of 34.0 to 34.9 in adult  -     Comprehensive Metabolic Panel; Future  -     CBC & Differential; Future  -     Lipid Panel; Future  -     Hemoglobin A1c; Future  -     MicroAlbumin, Urine, Random - Urine, Clean Catch; Future  -     Ambulatory Referral to Gastroenterology    7. Functional urinary incontinence  -     Comprehensive Metabolic Panel; Future  -     CBC & Differential; Future  -     Lipid Panel; Future  -     Hemoglobin A1c; Future  -     MicroAlbumin, Urine, Random - Urine, Clean Catch; Future  -     Ambulatory Referral to Gastroenterology             shingrix completed nov 2022,  Pneumovax/ flu shot -- done sept 2022    Low-grade B-cell lymphoma, CT of the chest September 2022 shows no evidence of recurrence of lymphoma in the chest area, CT abdomen and pelvis shows no convincing evidence of recurrent lymphoma in the abdomen and pelvis, cholelithiasis, noted, discussed with patient November 16, 2022 continues  follow-up    COVID POSTIVE NOV 2020,      TOTAL PROSTATECTOMY JAN 2021, WITH POSITIVE LYMPH NODES FOR LOW GRADE B CELL LYMPHOMA -----patient had a follow-up TURP for scar tissue removal, is now having to intermittently straight cath to avoid recurrence of scarring, as of May 2022 every 4 to 5 days,, continues November 2022, patient does have follow-up with Dr. Agus Christianson, current white count is normal May 2023,    ELEVATED WBC ,-- OCT 2019, --agus christianson does f/u -- white count fluctuates from 9-14,000 -- diagnosis of low-grade B-cell lymphoma,, found on prostatectomy with lymph node dissection,, diagnosed January 2021,, currently no treatment following clinically, current white count down to 10.4 November 9, 2022 ,continues to improve down to 10.0 May 2023,    Right neck pain, with radiculopathy in the C6-C7 possibly C5-C6 distribution, --- MRI, neurosurgical referral, physical therapy for possible traction,. --BETTER, DID SEE DR VELIZ currently not an issue November 2022     [employer], [employer] JOB 60 LB IN 1 YR SINCE AUG 2016,--now back up ---Discussed continued exercise weight loss, November 2022, marked improvement May 2023,    IFG ,HGA1C = 5.7, improved May 2023    Elevated bilirubin most likely [employer] isolated, previously elevated at 1.3 in the past several years,    htn --continue valsartan 320/25 mg  daily    elevated chol--continues Crestor 20 mg, and l co-Q10     psa per urology, ==6.2--BX POSITIVE CA,----UP TO 8.5 --BX --SEPT, 2020,------DR RIVERA==JAN 2021, TOTAL PROSTATECTOMY-----still staight cath q 3-4 days,   Had cysto April 2023,  Getting artifical sphincter placement  July 2023,   ---psa undetectable may 2023--    Follow Up   Return in about 6 months (around 11/17/2023).  Patient was given instructions and counseling regarding his condition or for health maintenance advice. Please see specific information pulled into the AVS if appropriate.

## 2023-05-18 RX ORDER — ROSUVASTATIN CALCIUM 10 MG/1
TABLET, COATED ORAL
Qty: 90 TABLET | Refills: 1 | Status: SHIPPED | OUTPATIENT
Start: 2023-05-18

## 2023-08-17 RX ORDER — ROSUVASTATIN CALCIUM 10 MG/1
10 TABLET, COATED ORAL
Qty: 90 TABLET | Refills: 3 | Status: SHIPPED | OUTPATIENT
Start: 2023-08-17

## 2023-08-17 NOTE — TELEPHONE ENCOUNTER
"Caller: Uriah Perez \"DIPTI\"    Relationship: Self    Best call back number: [phone]     Requested Prescriptions:   Requested Prescriptions     Pending Prescriptions Disp Refills    rosuvastatin (CRESTOR) 10 MG tablet 90 tablet 1     Sig: Take 1 tablet by mouth every night at bedtime.        Pharmacy where request should be sent: [facility] [id number] [facility] [address] [facility] [address] [facility] [phone] [facility] [phone] FX     Last office visit with prescribing clinician: 5/17/2023   Last telemedicine visit with prescribing clinician: Visit date not found   Next office visit with prescribing clinician: 11/20/2023     Additional details provided by patient:     Does the patient have less than a 3 day supply:  [] Yes  [x] No    Would you like a call back once the refill request has been completed: [x] Yes [] No    If the office needs to give you a call back, can they leave a voicemail: [x] Yes [] No    Natalie Cat Rep   08/17/23 11:50 EDT         "

## 2023-11-13 ENCOUNTER — CLINICAL SUPPORT (OUTPATIENT)
Dept: INTERNAL MEDICINE | Facility: CLINIC | Age: 73
End: 2023-11-13
Payer: MEDICARE

## 2023-11-13 DIAGNOSIS — I10 HYPERTENSION, ESSENTIAL: ICD-10-CM

## 2023-11-13 DIAGNOSIS — E78.2 MIXED HYPERLIPIDEMIA: ICD-10-CM

## 2023-11-13 DIAGNOSIS — C85.96 LYMPHOMA OF LYMPH NODES IN PELVIS, UNSPECIFIED LYMPHOMA TYPE: ICD-10-CM

## 2023-11-13 DIAGNOSIS — C61 PROSTATE CANCER: ICD-10-CM

## 2023-11-13 DIAGNOSIS — R39.81 FUNCTIONAL URINARY INCONTINENCE: ICD-10-CM

## 2023-11-13 DIAGNOSIS — R73.01 IFG (IMPAIRED FASTING GLUCOSE): ICD-10-CM

## 2023-11-13 DIAGNOSIS — E66.09 CLASS 1 OBESITY DUE TO EXCESS CALORIES WITHOUT SERIOUS COMORBIDITY WITH BODY MASS INDEX (BMI) OF 34.0 TO 34.9 IN ADULT: ICD-10-CM

## 2023-11-13 LAB
ALBUMIN SERPL-MCNC: 4.3 G/DL (ref 3.5–5.2)
ALBUMIN UR-MCNC: <1.2 MG/DL
ALBUMIN/GLOB SERPL: 1.7 G/DL
ALP SERPL-CCNC: 70 U/L (ref 39–117)
ALT SERPL W P-5'-P-CCNC: 21 U/L (ref 1–41)
ANION GAP SERPL CALCULATED.3IONS-SCNC: 8.5 MMOL/L (ref 5–15)
AST SERPL-CCNC: 20 U/L (ref 1–40)
BASOPHILS # BLD AUTO: 0.09 10*3/MM3 (ref 0–0.2)
BASOPHILS NFR BLD AUTO: 0.9 % (ref 0–1.5)
BILIRUB SERPL-MCNC: 0.9 MG/DL (ref 0–1.2)
BUN SERPL-MCNC: 21 MG/DL (ref 8–23)
BUN/CREAT SERPL: 22.6 (ref 7–25)
CALCIUM SPEC-SCNC: 10 MG/DL (ref 8.6–10.5)
CHLORIDE SERPL-SCNC: 103 MMOL/L (ref 98–107)
CHOLEST SERPL-MCNC: 194 MG/DL (ref 0–200)
CO2 SERPL-SCNC: 28.5 MMOL/L (ref 22–29)
CREAT SERPL-MCNC: 0.93 MG/DL (ref 0.76–1.27)
DEPRECATED RDW RBC AUTO: 39.4 FL (ref 37–54)
EGFRCR SERPLBLD CKD-EPI 2021: 87.8 ML/MIN/1.73
EOSINOPHIL # BLD AUTO: 0.35 10*3/MM3 (ref 0–0.4)
EOSINOPHIL NFR BLD AUTO: 3.6 % (ref 0.3–6.2)
ERYTHROCYTE [DISTWIDTH] IN BLOOD BY AUTOMATED COUNT: 12.1 % (ref 12.3–15.4)
GLOBULIN UR ELPH-MCNC: 2.6 GM/DL
GLUCOSE SERPL-MCNC: 123 MG/DL (ref 65–99)
HBA1C MFR BLD: 5.9 % (ref 4.8–5.6)
HCT VFR BLD AUTO: 47.4 % (ref 37.5–51)
HDLC SERPL-MCNC: 58 MG/DL (ref 40–60)
HGB BLD-MCNC: 15.8 G/DL (ref 13–17.7)
IMM GRANULOCYTES # BLD AUTO: 0.05 10*3/MM3 (ref 0–0.05)
IMM GRANULOCYTES NFR BLD AUTO: 0.5 % (ref 0–0.5)
LDLC SERPL CALC-MCNC: 116 MG/DL (ref 0–100)
LDLC/HDLC SERPL: 1.96 {RATIO}
LYMPHOCYTES # BLD AUTO: 3.99 10*3/MM3 (ref 0.7–3.1)
LYMPHOCYTES NFR BLD AUTO: 40.8 % (ref 19.6–45.3)
MCH RBC QN AUTO: 29.8 PG (ref 26.6–33)
MCHC RBC AUTO-ENTMCNC: 33.3 G/DL (ref 31.5–35.7)
MCV RBC AUTO: 89.4 FL (ref 79–97)
MONOCYTES # BLD AUTO: 0.71 10*3/MM3 (ref 0.1–0.9)
MONOCYTES NFR BLD AUTO: 7.3 % (ref 5–12)
NEUTROPHILS NFR BLD AUTO: 4.59 10*3/MM3 (ref 1.7–7)
NEUTROPHILS NFR BLD AUTO: 46.9 % (ref 42.7–76)
NRBC BLD AUTO-RTO: 0 /100 WBC (ref 0–0.2)
PLATELET # BLD AUTO: 293 10*3/MM3 (ref 140–450)
PMV BLD AUTO: 12 FL (ref 6–12)
POTASSIUM SERPL-SCNC: 4.8 MMOL/L (ref 3.5–5.2)
PROT SERPL-MCNC: 6.9 G/DL (ref 6–8.5)
RBC # BLD AUTO: 5.3 10*6/MM3 (ref 4.14–5.8)
SODIUM SERPL-SCNC: 140 MMOL/L (ref 136–145)
TRIGL SERPL-MCNC: 113 MG/DL (ref 0–150)
VLDLC SERPL-MCNC: 20 MG/DL (ref 5–40)
WBC NRBC COR # BLD: 9.78 10*3/MM3 (ref 3.4–10.8)

## 2023-11-13 PROCEDURE — 36415 COLL VENOUS BLD VENIPUNCTURE: CPT | Performed by: INTERNAL MEDICINE

## 2023-11-13 PROCEDURE — 82043 UR ALBUMIN QUANTITATIVE: CPT | Performed by: INTERNAL MEDICINE

## 2023-11-13 PROCEDURE — 80053 COMPREHEN METABOLIC PANEL: CPT | Performed by: INTERNAL MEDICINE

## 2023-11-13 PROCEDURE — 83036 HEMOGLOBIN GLYCOSYLATED A1C: CPT | Performed by: INTERNAL MEDICINE

## 2023-11-13 PROCEDURE — 85025 COMPLETE CBC W/AUTO DIFF WBC: CPT | Performed by: INTERNAL MEDICINE

## 2023-11-13 PROCEDURE — 80061 LIPID PANEL: CPT | Performed by: INTERNAL MEDICINE

## 2023-11-20 ENCOUNTER — OFFICE VISIT (OUTPATIENT)
Dept: INTERNAL MEDICINE | Facility: CLINIC | Age: 73
End: 2023-11-20
Payer: MEDICARE

## 2023-11-20 VITALS
OXYGEN SATURATION: 94 % | TEMPERATURE: 98 F | SYSTOLIC BLOOD PRESSURE: 162 MMHG | DIASTOLIC BLOOD PRESSURE: 84 MMHG | HEART RATE: 70 BPM | HEIGHT: 68 IN | BODY MASS INDEX: 35.92 KG/M2 | WEIGHT: 237 LBS

## 2023-11-20 DIAGNOSIS — R73.01 IFG (IMPAIRED FASTING GLUCOSE): ICD-10-CM

## 2023-11-20 DIAGNOSIS — E78.2 MIXED HYPERLIPIDEMIA: ICD-10-CM

## 2023-11-20 DIAGNOSIS — C61 PROSTATE CANCER: ICD-10-CM

## 2023-11-20 DIAGNOSIS — Z23 NEED FOR VACCINATION: Primary | ICD-10-CM

## 2023-11-20 DIAGNOSIS — E66.09 CLASS 1 OBESITY DUE TO EXCESS CALORIES WITHOUT SERIOUS COMORBIDITY WITH BODY MASS INDEX (BMI) OF 34.0 TO 34.9 IN ADULT: ICD-10-CM

## 2023-11-20 DIAGNOSIS — Z00.00 MEDICARE ANNUAL WELLNESS VISIT, SUBSEQUENT: ICD-10-CM

## 2023-11-20 DIAGNOSIS — C85.96 LYMPHOMA OF LYMPH NODES IN PELVIS, UNSPECIFIED LYMPHOMA TYPE: ICD-10-CM

## 2023-11-20 DIAGNOSIS — I10 HYPERTENSION, ESSENTIAL: ICD-10-CM

## 2023-11-20 DIAGNOSIS — Z12.11 SCREENING FOR COLON CANCER: ICD-10-CM

## 2023-11-20 NOTE — PROGRESS NOTES
The ABCs of the Annual Wellness Visit  Subsequent Medicare Wellness Visit    Subjective      Uriah Perez is a 73 y.o. male who presents for a Subsequent Medicare Wellness Visit.    The following portions of the patient's history were reviewed and   updated as appropriate: allergies, current medications, past family history, past medical history, past social history, past surgical history, and problem list.    Compared to one year ago, the patient feels his physical   health is the same.    Compared to one year ago, the patient feels his mental   health is the same.    Recent Hospitalizations:  He was not admitted to the hospital during the last year.       Current Medical Providers:  Patient Care Team:  Lorenzo Brand MD as PCP - General (Internal Medicine)    Outpatient Medications Prior to Visit   Medication Sig Dispense Refill    albuterol sulfate  (90 Base) MCG/ACT inhaler Inhale 2 puffs Every 4 (Four) Hours As Needed for Wheezing or Shortness of Air. 8 g 0    aspirin 81 MG EC tablet Take 1 tablet by mouth Daily.      multivitamin with minerals tablet tablet Take 1 tablet by mouth Daily.      Omega-3 Fatty Acids (fish oil) 1000 MG capsule capsule Take  by mouth Daily With Breakfast.      rosuvastatin (CRESTOR) 10 MG tablet Take 1 tablet by mouth every night at bedtime. 90 tablet 3    valsartan-hydrochlorothiazide (DIOVAN-HCT) 320-12.5 MG per tablet TAKE ONE TABLET BY MOUTH DAILY 90 tablet 3     No facility-administered medications prior to visit.       No opioid medication identified on active medication list. I have reviewed chart for other potential  high risk medication/s and harmful drug interactions in the elderly.        Aspirin is on active medication list. Aspirin use is indicated based on review of current medical condition/s. Pros and cons of this therapy have been discussed today. Benefits of this medication outweigh potential harm.  Patient has been encouraged to continue taking  "this medication.  .      Patient Active Problem List   Diagnosis    Cervical radiculopathy at C6    Prostate cancer    Bladder neck contracture    IFG (impaired fasting glucose)    Class 1 obesity due to excess calories without serious comorbidity with body mass index (BMI) of 34.0 to 34.9 in adult    Hypertension, essential    Mixed hyperlipidemia    Lymphoma of lymph nodes in pelvis    COVID-19 virus infection    Functional urinary incontinence    Medicare annual wellness visit, subsequent    Screening for colon cancer     Advance Care Planning   Advance Care Planning     Advance Directive is not on file.  ACP discussion was held with the patient during this visit. Patient has an advance directive (not in EMR), copy requested.     Objective    Vitals:    11/20/23 0912   BP: 162/84   Pulse: 70   Temp: 98 °F (36.7 °C)   SpO2: 94%   Weight: 108 kg (237 lb)   Height: 172.7 cm (67.99\")     Estimated body mass index is 36.04 kg/m² as calculated from the following:    Height as of this encounter: 172.7 cm (67.99\").    Weight as of this encounter: 108 kg (237 lb).    Class 2 Severe Obesity (BMI >=35 and <=39.9). Obesity-related health conditions include the following: hypertension and dyslipidemias. Obesity is worsening. BMI is is above average; BMI management plan is completed. We discussed portion control, increasing exercise, and joining a fitness center or start home based exercise program.      Does the patient have evidence of cognitive impairment?   No    Lab Results   Component Value Date    TRIG 113 11/13/2023    HDL 58 11/13/2023     (H) 11/13/2023    VLDL 20 11/13/2023    HGBA1C 5.90 (H) 11/13/2023          HEALTH RISK ASSESSMENT    Smoking Status:  Social History     Tobacco Use   Smoking Status Light Smoker    Packs/day: 0.00    Years: 55.00    Additional pack years: 0.00    Total pack years: 0.00    Types: Cigarettes   Smokeless Tobacco Former    Quit date: 1991   Tobacco Comments    once a month "     Alcohol Consumption:  Social History     Substance and Sexual Activity   Alcohol Use Yes    Alcohol/week: 4.0 standard drinks of alcohol    Types: 2 Cans of beer, 2 Shots of liquor per week    Comment: WEEKLY     Fall Risk Screen:    ISIDRO Fall Risk Assessment was completed, and patient is at LOW risk for falls.Assessment completed on:2023    Depression Screenin/17/2023    10:56 AM   PHQ-2/PHQ-9 Depression Screening   Little Interest or Pleasure in Doing Things 0-->not at all   Feeling Down, Depressed or Hopeless 0-->not at all   PHQ-9: Brief Depression Severity Measure Score 0       Health Habits and Functional and Cognitive Screenin/20/2023     9:00 AM   Functional & Cognitive Status   Do you have difficulty preparing food and eating? No   Do you have difficulty bathing yourself, getting dressed or grooming yourself? No   Do you have difficulty using the toilet? No   Do you have difficulty moving around from place to place? No   Do you have trouble with steps or getting out of a bed or a chair? No   Do you need help using the phone?  No   Are you deaf or do you have serious difficulty hearing?  No   Do you need help to go to places out of walking distance? No   Do you need help shopping? No   Do you need help preparing meals?  No   Do you need help with housework?  No   Do you need help with laundry? No   Do you need help taking your medications? No   Do you need help managing money? No   Do you ever drive or ride in a car without wearing a seat belt? No   Do you have difficulty concentrating, remembering or making decisions? No       Age-appropriate Screening Schedule:  Refer to the list below for future screening recommendations based on patient's age, sex and/or medical conditions. Orders for these recommended tests are listed in the plan section. The patient has been provided with a written plan.    Health Maintenance   Topic Date Due    COLORECTAL CANCER SCREENING  Never done     TDAP/TD VACCINES (1 - Tdap) Never done    HEPATITIS C SCREENING  Never done    DIABETIC FOOT EXAM  Never done    DIABETIC EYE EXAM  Never done    AAA SCREEN (ONE-TIME)  Never done    INFLUENZA VACCINE  08/01/2023    COVID-19 Vaccine (6 - 2023-24 season) 09/01/2023    HEMOGLOBIN A1C  05/13/2024    LIPID PANEL  11/13/2024    URINE MICROALBUMIN  11/13/2024    ANNUAL WELLNESS VISIT  11/20/2024    BMI FOLLOWUP  11/20/2024    Pneumococcal Vaccine 65+  Completed    ZOSTER VACCINE  Completed                  CMS Preventative Services Quick Reference  Risk Factors Identified During Encounter:    None Identified    The above risks/problems have been discussed with the patient.  Pertinent information has been shared with the patient in the After Visit Summary.    Diagnoses and all orders for this visit:    1. Medicare annual wellness visit, subsequent (Primary)  -     Hemoglobin A1c; Future  -     Comprehensive Metabolic Panel; Future  -     CBC & Differential; Future  -     Lipid Panel; Future  -     TSH+Free T4; Future    2. IFG (impaired fasting glucose)  -     Hemoglobin A1c; Future  -     Comprehensive Metabolic Panel; Future  -     CBC & Differential; Future  -     Lipid Panel; Future  -     TSH+Free T4; Future    3. Prostate cancer  -     Hemoglobin A1c; Future  -     Comprehensive Metabolic Panel; Future  -     CBC & Differential; Future  -     Lipid Panel; Future  -     TSH+Free T4; Future    4. Class 1 obesity due to excess calories without serious comorbidity with body mass index (BMI) of 34.0 to 34.9 in adult  -     Hemoglobin A1c; Future  -     Comprehensive Metabolic Panel; Future  -     CBC & Differential; Future  -     Lipid Panel; Future  -     TSH+Free T4; Future    5. Screening for colon cancer  -     Hemoglobin A1c; Future  -     Comprehensive Metabolic Panel; Future  -     CBC & Differential; Future  -     Lipid Panel; Future  -     TSH+Free T4; Future    6. Lymphoma of lymph nodes in pelvis, unspecified  lymphoma type  -     Hemoglobin A1c; Future  -     Comprehensive Metabolic Panel; Future  -     CBC & Differential; Future  -     Lipid Panel; Future  -     TSH+Free T4; Future    7. Hypertension, essential  -     Hemoglobin A1c; Future  -     Comprehensive Metabolic Panel; Future  -     CBC & Differential; Future  -     Lipid Panel; Future  -     TSH+Free T4; Future    8. Mixed hyperlipidemia  -     Hemoglobin A1c; Future  -     Comprehensive Metabolic Panel; Future  -     CBC & Differential; Future  -     Lipid Panel; Future  -     TSH+Free T4; Future        Follow Up:   Next Medicare Wellness visit to be scheduled in 1 year.      An After Visit Summary and PPPS were made available to the patient.            Answers submitted by the patient for this visit:  Primary Reason for Visit (Submitted on 11/13/2023)  What is the primary reason for your visit?: Physical

## 2023-11-20 NOTE — PROGRESS NOTES
"CHIEF COMPLAINT/ HPI:  Hyperlipidemia (Routine follow up. Lab follow up. Flu shot request. )    F/u with dm and htn, ---          Objective   Vital Signs  Vitals:    11/20/23 0912   BP: 162/84   Pulse: 70   Temp: 98 °F (36.7 °C)   SpO2: 94%   Weight: 108 kg (237 lb)   Height: 172.7 cm (67.99\")      Body mass index is 36.04 kg/m².  Review of Systems   Constitutional: Negative.    HENT: Negative.     Eyes: Negative.    Respiratory: Negative.     Cardiovascular: Negative.    Gastrointestinal: Negative.    Endocrine: Negative.    Genitourinary: Negative.    Musculoskeletal: Negative.    Allergic/Immunologic: Negative.    Neurological: Negative.    Hematological: Negative.    Psychiatric/Behavioral: Negative.        Physical Exam  Constitutional:       General: He is not in acute distress.     Appearance: Normal appearance.   HENT:      Head: Normocephalic.      Mouth/Throat:      Mouth: Mucous membranes are moist.   Eyes:      Conjunctiva/sclera: Conjunctivae normal.      Pupils: Pupils are equal, round, and reactive to light.   Cardiovascular:      Rate and Rhythm: Normal rate and regular rhythm.      Pulses: Normal pulses.      Heart sounds: Normal heart sounds.   Pulmonary:      Effort: Pulmonary effort is normal.      Breath sounds: Normal breath sounds.   Abdominal:      General: Abdomen is flat. Bowel sounds are normal.      Palpations: Abdomen is soft.   Musculoskeletal:         General: No swelling. Normal range of motion.      Cervical back: Neck supple.   Skin:     General: Skin is warm and dry.      Coloration: Skin is not jaundiced.   Neurological:      General: No focal deficit present.      Mental Status: He is alert and oriented to person, place, and time. Mental status is at baseline.   Psychiatric:         Mood and Affect: Mood normal.         Behavior: Behavior normal.         Thought Content: Thought content normal.         Judgment: Judgment normal.        Result Review :   No results found for: " "\"PROBNP\", \"BNP\"  CMP          5/9/2023    10:19 11/13/2023    09:22   CMP   Glucose 112  123    BUN 17  21    Creatinine 0.93  0.93    EGFR 88.3  87.8    Sodium 143  140    Potassium 4.5  4.8    Chloride 106  103    Calcium 9.9  10.0    Total Protein 6.3  6.9    Albumin 4.2  4.3    Globulin 2.1  2.6    Total Bilirubin 1.1  0.9    Alkaline Phosphatase 50  70    AST (SGOT) 16  20    ALT (SGPT) 23  21    Albumin/Globulin Ratio 2.0  1.7    BUN/Creatinine Ratio 18.3  22.6    Anion Gap 7.5  8.5      CBC w/diff          7/18/2023    04:24 8/29/2023    14:09 11/13/2023    09:22   CBC w/Diff   WBC 17.89     12.43     9.78    RBC 4.38     4.77     5.30    Hemoglobin 13.3     14.5     15.8    Hematocrit 39.7     44.2     47.4    MCV 90.6     92.7     89.4    MCH 30.4     30.4     29.8    MCHC 33.5     32.8     33.3    RDW 12.4     12.8     12.1    Platelets 231     232     293    Neutrophil Rel % 73.9     55.5     46.9    Immature Granulocyte Rel % 0.4     0.2     0.5    Lymphocyte Rel % 17.6     34.3     40.8    Monocyte Rel % 7.7     7.2     7.3    Eosinophil Rel % 0.2     2.2     3.6    Basophil Rel % 0.2     0.6     0.9       Details          This result is from an external source.              Lipid Panel          5/9/2023    10:19 11/13/2023    09:22   Lipid Panel   Total Cholesterol 176  194    Triglycerides 87  113    HDL Cholesterol 65  58    VLDL Cholesterol 16  20    LDL Cholesterol  95  116    LDL/HDL Ratio 1.44  1.96       Lab Results   Component Value Date    TSH 3.450 05/09/2023    TSH 3.080 05/05/2022    TSH 2.610 11/03/2021      Lab Results   Component Value Date    FREET4 1.10 05/09/2023      A1C Last 3 Results          5/9/2023    10:19 7/18/2023    04:24 11/13/2023    09:22   HGBA1C Last 3 Results   Hemoglobin A1C 5.70  5.8     5.90       Details          This result is from an external source.              PSA          5/9/2023    10:19   PSA   PSA <0.014                     Visit Diagnoses:    ICD-10-CM " ICD-9-CM   1. Medicare annual wellness visit, subsequent  Z00.00 V70.0   2. IFG (impaired fasting glucose)  R73.01 790.21   3. Prostate cancer  C61 185   4. Class 1 obesity due to excess calories without serious comorbidity with body mass index (BMI) of 34.0 to 34.9 in adult  E66.09 278.00    Z68.34 V85.34   5. Screening for colon cancer  Z12.11 V76.51   6. Lymphoma of lymph nodes in pelvis, unspecified lymphoma type  C85.96 202.86   7. Hypertension, essential  I10 401.9   8. Mixed hyperlipidemia  E78.2 272.2       Assessment and Plan   Diagnoses and all orders for this visit:    1. Medicare annual wellness visit, subsequent (Primary)  -     Hemoglobin A1c; Future  -     Comprehensive Metabolic Panel; Future  -     CBC & Differential; Future  -     Lipid Panel; Future  -     TSH+Free T4; Future    2. IFG (impaired fasting glucose)  -     Hemoglobin A1c; Future  -     Comprehensive Metabolic Panel; Future  -     CBC & Differential; Future  -     Lipid Panel; Future  -     TSH+Free T4; Future    3. Prostate cancer  -     Hemoglobin A1c; Future  -     Comprehensive Metabolic Panel; Future  -     CBC & Differential; Future  -     Lipid Panel; Future  -     TSH+Free T4; Future    4. Class 1 obesity due to excess calories without serious comorbidity with body mass index (BMI) of 34.0 to 34.9 in adult  -     Hemoglobin A1c; Future  -     Comprehensive Metabolic Panel; Future  -     CBC & Differential; Future  -     Lipid Panel; Future  -     TSH+Free T4; Future    5. Screening for colon cancer  -     Hemoglobin A1c; Future  -     Comprehensive Metabolic Panel; Future  -     CBC & Differential; Future  -     Lipid Panel; Future  -     TSH+Free T4; Future    6. Lymphoma of lymph nodes in pelvis, unspecified lymphoma type  -     Hemoglobin A1c; Future  -     Comprehensive Metabolic Panel; Future  -     CBC & Differential; Future  -     Lipid Panel; Future  -     TSH+Free T4; Future    7. Hypertension, essential  -      Hemoglobin A1c; Future  -     Comprehensive Metabolic Panel; Future  -     CBC & Differential; Future  -     Lipid Panel; Future  -     TSH+Free T4; Future    8. Mixed hyperlipidemia  -     Hemoglobin A1c; Future  -     Comprehensive Metabolic Panel; Future  -     CBC & Differential; Future  -     Lipid Panel; Future  -     TSH+Free T4; Future        Class 2 Severe Obesity (BMI >=35 and <=39.9). Obesity-related health conditions include the following: hypertension and dyslipidemias. Obesity is unchanged. BMI is is above average; BMI management plan is completed. We discussed portion control, increasing exercise, and joining a fitness center or start home based exercise program.     shingrix completed nov 2022,  Pneumovax/ flu shot -- done sept 2022    Low-grade B-cell lymphoma, CT of the chest September 2022 shows no evidence of recurrence of lymphoma in the chest area, CT abdomen and pelvis shows no convincing evidence of recurrent lymphoma in the abdomen and pelvis, cholelithiasis, noted, discussed with patient November 16, 2022 continues follow-up     TOTAL PROSTATECTOMY JAN 2021, WITH POSITIVE LYMPH NODES FOR LOW GRADE B CELL LYMPHOMA -----patient had a follow-up TURP for scar tissue removal, is now having to intermittently straight cath to avoid recurrence of scarring, as of May 2022 every 4 to 5 days,, continues November 2022, patient does have follow-up with Dr. Agus Christianson, current white count is normal May 2023,    ELEVATED WBC ,-- OCT 2019, --agus christianson does f/u -- white count fluctuates from 9-14,000 -- diagnosis of low-grade B-cell lymphoma,, found on prostatectomy with lymph node dissection,, diagnosed January 2021,, currently no treatment following clinically, current white count down to 9.7 November 13, 2023    Right neck pain, with radiculopathy in the C6-C7 possibly C5-C6 distribution, --- MRI, neurosurgical referral, physical therapy for possible traction,. --BETTER, DID SEE DR VELIZ currently not  an issue November 2022     OVERWGT,, weight back up November 2023 continue weight loss efforts,     IFG ,HGA1C = 5.9 November 13, 2023, urine microalbumin was normal November 13, 2023    elevated bilirubin most likely Ada-- isolated, previously elevated at 1.3 in the past several years, normal, November 13, 2023    htn --continue valsartan 320/25 mg  daily    elevated chol--continues Crestor 20 mg, and l co-Q10     psa per urology, ==6.2--BX POSITIVE CA,----UP TO 8.5 --BX --SEPT, 2020,------DR RIVERA==JAN 2021, TOTAL PROSTATECTOMY-----still staight cath q 3-4 days,   Had cysto April 2023,  Getting artifical sphincter placement  July 2023,   ---psa undetectable may 2023--    Colonoscopy screening, general surgery December 2023, pending      Follow Up   Return in about 6 months (around 5/20/2024).  Patient was given instructions and counseling regarding his condition or for health maintenance advice. Please see specific information pulled into the AVS if appropriate.         Answers submitted by the patient for this visit:  Primary Reason for Visit (Submitted on 11/13/2023)  What is the primary reason for your visit?: Physical

## 2023-12-18 ENCOUNTER — OFFICE VISIT (OUTPATIENT)
Dept: SURGERY | Facility: CLINIC | Age: 73
End: 2023-12-18
Payer: MEDICARE

## 2023-12-18 ENCOUNTER — PREP FOR SURGERY (OUTPATIENT)
Dept: OTHER | Facility: HOSPITAL | Age: 73
End: 2023-12-18
Payer: MEDICARE

## 2023-12-18 VITALS
SYSTOLIC BLOOD PRESSURE: 132 MMHG | BODY MASS INDEX: 38.3 KG/M2 | WEIGHT: 244 LBS | HEART RATE: 80 BPM | HEIGHT: 67 IN | DIASTOLIC BLOOD PRESSURE: 84 MMHG

## 2023-12-18 DIAGNOSIS — Z12.11 SCREENING FOR MALIGNANT NEOPLASM OF COLON: Primary | ICD-10-CM

## 2023-12-18 PROCEDURE — 1159F MED LIST DOCD IN RCRD: CPT | Performed by: NURSE PRACTITIONER

## 2023-12-18 PROCEDURE — 3079F DIAST BP 80-89 MM HG: CPT | Performed by: NURSE PRACTITIONER

## 2023-12-18 PROCEDURE — 1160F RVW MEDS BY RX/DR IN RCRD: CPT | Performed by: NURSE PRACTITIONER

## 2023-12-18 PROCEDURE — 3075F SYST BP GE 130 - 139MM HG: CPT | Performed by: NURSE PRACTITIONER

## 2023-12-18 PROCEDURE — S0260 H&P FOR SURGERY: HCPCS | Performed by: NURSE PRACTITIONER

## 2023-12-18 RX ORDER — SODIUM CHLORIDE 0.9 % (FLUSH) 0.9 %
3 SYRINGE (ML) INJECTION EVERY 12 HOURS SCHEDULED
OUTPATIENT
Start: 2023-12-18

## 2023-12-18 RX ORDER — SODIUM, POTASSIUM,MAG SULFATES 17.5-3.13G
SOLUTION, RECONSTITUTED, ORAL ORAL
Qty: 354 ML | Refills: 0 | Status: SHIPPED | OUTPATIENT
Start: 2023-12-18

## 2023-12-18 RX ORDER — SODIUM CHLORIDE 9 MG/ML
40 INJECTION, SOLUTION INTRAVENOUS AS NEEDED
OUTPATIENT
Start: 2023-12-18

## 2023-12-18 RX ORDER — SODIUM CHLORIDE 0.9 % (FLUSH) 0.9 %
10 SYRINGE (ML) INJECTION AS NEEDED
OUTPATIENT
Start: 2023-12-18

## 2023-12-18 NOTE — PROGRESS NOTES
Chief Complaint: Colonoscopy (consult)    Subjective      Colonoscopy consultation        History of Present Illness  Uriah Perez is a 73 y.o. male presents to [facility] for colonoscopy consultation.    Patient presents today on referral from Dr. Edgardo Brand for colonoscopy consultation.  Patient denies any abdominal pain, change in bowel habit, or rectal bleeding.  Denies any family history of colorectal cancer.  Reports his last colonoscopy was 11+ years ago with Dr. Chakraborty and was normal.    Patient denies BAUTISTA.  Denies any cardiac issues.  Denies taking a GLP-1 receptors.        Objective     Past Medical History:   Diagnosis Date    Arthritis     GENERALIZED    At risk for sleep apnea     Disorder of bilirubin metabolism, unspecified     Disorder of white blood cells, unspecified     Eczema     SCALP AREA    Elevated cholesterol     Elevated PSA     History of 2019 novel coronavirus disease (COVID-19)     NOV 1, 2020 ([facility])    Hypercholesteremia     Hyperlipidemia     Hypertension     IFG (impaired fasting glucose)     Lymphoma     Pre-diabetes     Prostate cancer     Pure hypercholesterolemia     Urgency of urination     Vitamin D deficiency        Past Surgical History:   Procedure Laterality Date    BICEPS TENDON REPAIR Left 2018    COLONOSCOPY  2010    CYSTOSCOPY URETHROTOMY VISUAL INTERNAL N/A 9/2/2021    Procedure: CYSTOSCOPY WITH INTERNAL URETHROTOMY;  Surgeon: Alberto Duran Jr., MD;  Location: [facility];  Service: Urology;  Laterality: N/A;    INGUINAL HERNIA REPAIR Left     OTHER SURGICAL HISTORY  02/2018    ligament repair    PROSTATECTOMY N/A 1/28/2021    Procedure: DAVINCI PROSTATECTOMY LAPAROSCOPIC WITH DILATERAL PELVIC LYMPH NODE DISSECTION;  Surgeon: Alberto Duran Jr., MD;  Location: [facility] OR;  Service: DaVinci;  Laterality: N/A;    TONSILLECTOMY  1957    UMBILICAL HERNIA REPAIR      WRIST SURGERY Left 1976        Outpatient Medications Marked as Taking for the 12/18/23 encounter (Office Visit) with Priyanka IsbellDIAMOND   Medication Sig Dispense Refill    albuterol sulfate  (90 Base) MCG/ACT inhaler Inhale 2 puffs Every 4 (Four) Hours As Needed for Wheezing or Shortness of Air. 8 g 0    aspirin 81 MG EC tablet Take 1 tablet by mouth Daily.      COENZYME Q10 PO Take  by mouth.      multivitamin with minerals tablet tablet Take 1 tablet by mouth Daily.      Omega-3 Fatty Acids (fish oil) 1000 MG capsule capsule Take  by mouth Daily With Breakfast.      rosuvastatin (CRESTOR) 10 MG tablet Take 1 tablet by mouth every night at bedtime. 90 tablet 3    valsartan-hydrochlorothiazide (DIOVAN-HCT) 320-12.5 MG per tablet TAKE ONE TABLET BY MOUTH DAILY 90 tablet 3       No Known Allergies     Family History   Problem Relation Age of Onset    Cancer Mother     Arthritis Mother     Diabetes Father     Cancer Father     Arthritis Father     Diabetes Sister         Type 2    Diabetes Brother         Type 2    Malig Hyperthermia Neg Hx        Social History     Socioeconomic History    Marital status: Single   Tobacco Use    Smoking status: Light Smoker     Packs/day: 0.00     Years: 55.00     Additional pack years: 0.00     Total pack years: 0.00     Types: Cigarettes    Smokeless tobacco: Former     Quit date: 1991    Tobacco comments:     once a month   Vaping Use    Vaping Use: Never used   Substance and Sexual Activity    Alcohol use: Yes     Alcohol/week: 4.0 standard drinks of alcohol     Types: 2 Cans of beer, 2 Shots of liquor per week     Comment: WEEKLY    Drug use: Not Currently     Types: Marijuana    Sexual activity: Never       Review of Systems   Constitutional:  Negative for chills and fever.   Gastrointestinal:  Negative for abdominal distention, abdominal pain, anal bleeding, blood in stool, constipation, diarrhea and rectal pain.        Vital Signs:   /84 (BP Location: Right arm)   Pulse 80   Ht 170.2  "cm (67\")   Wt 111 kg (244 lb)   BMI 38.22 kg/m²      Physical Exam  Vitals and nursing note reviewed.   Constitutional:       General: He is not in acute distress.     Appearance: Normal appearance.   HENT:      Head: Normocephalic.   Cardiovascular:      Rate and Rhythm: Normal rate.   Pulmonary:      Effort: Pulmonary effort is normal.      Breath sounds: No stridor.   Abdominal:      Palpations: Abdomen is soft.      Tenderness: There is no guarding.   Musculoskeletal:         General: No deformity. Normal range of motion.   Skin:     General: Skin is warm and dry.      Coloration: Skin is not jaundiced.   Neurological:      General: No focal deficit present.      Mental Status: He is alert and oriented to person, place, and time.   Psychiatric:         Mood and Affect: Mood normal.         Thought Content: Thought content normal.          Result Review :          []  Laboratory  []  Radiology  []  Pathology  []  Microbiology  []  EKG/Telemetry   []  Cardiology/Vascular   []  Old records  I spent 15 minutes caring for Uriah on this date of service. This time includes time spent by me in the following activities: reviewing tests, obtaining and/or reviewing a separately obtained history, performing a medically appropriate examination and/or evaluation, ordering medications, tests, or procedures, and documenting information in the medical record.     Assessment and Plan    Diagnoses and all orders for this visit:    1. Screening for malignant neoplasm of colon (Primary)    Other orders  -     sodium-potassium-magnesium sulfates (Suprep Bowel Prep Kit) 17.5-3.13-1.6 GM/177ML solution oral solution; Take as directed.  Instructions given in office.  Dispense: 2 bottles  Dispense: 354 mL; Refill: 0        Follow Up   Return for Schedule colonoscopy with Dr. Starks on 4/11/[facility]24 [facility].    Hospital arrival time: 0800    Possible risks/complications, benefits, and alternatives to surgical or invasive " procedures have been explained to patient and/or legal guardian.    Patient has been evaluated and can tolerate anesthesia and/or sedation. Risks, benefits, and alternatives to anesthesia and sedation have been explained to the patient and/or legal guardian. Patient verbalizes understanding and is willing to proceed with the above plan.     Patient was given instructions and counseling regarding his condition or for health maintenance advice. Please see specific information pulled into the AVS if appropriate.

## 2023-12-27 ENCOUNTER — PATIENT ROUNDING (BHMG ONLY) (OUTPATIENT)
Dept: SURGERY | Facility: CLINIC | Age: 73
End: 2023-12-27
Payer: MEDICARE

## 2023-12-27 NOTE — PROGRESS NOTES
December 27, 2023    Hello, may I speak with Uriah Perez?    My name is Roberta Handley      I am  with [facility]  [facility]  [address]  [place] [address] [phone].    Before we get started may I verify your date of birth? 1950    I am calling to officially welcome you to our practice and ask about your recent visit. Is this a good time to talk? yes    Tell me about your visit with us. What things went well?    Everything was fine with my appointment. The only thing I didn't understand is why it takes so long to get scheduled?  I was referred, took some time to be scheduled for the consult, and then I am still waiting a few months to have the procedure done. In all it is about 11 months from the time I was referred.        We're always looking for ways to make our patients' experiences even better. Do you have recommendations on ways we may improve?  no    Overall were you satisfied with your first visit to our practice? yes       I appreciate you taking the time to speak with me today. Is there anything else I can do for you? no      Thank you, and have a great day.

## 2024-04-03 NOTE — PRE-PROCEDURE INSTRUCTIONS
"Instructed on date and arrival time of 0800. Come to entrance \"C\". Must have  over age 18 to drive home.  May have two visitors; however, children under 12 must stay in waiting room.  Discussed clear liquid diet (no red or purple), bowel prep, and NPO.  May take medications as usual except for blood thinners, diabetic medications, and weight loss medications.  Verbalized understanding of instructions given.  Instructed to call for questions or concerns.  "

## 2024-04-10 ENCOUNTER — ANESTHESIA EVENT (OUTPATIENT)
Dept: GASTROENTEROLOGY | Facility: HOSPITAL | Age: 74
End: 2024-04-10
Payer: MEDICARE

## 2024-04-10 NOTE — ANESTHESIA PREPROCEDURE EVALUATION
Anesthesia Evaluation     Patient summary reviewed and Nursing notes reviewed   NPO Solid Status: > 8 hours  NPO Liquid Status: > 8 hours           Airway   Mallampati: II  TM distance: >3 FB  Neck ROM: full  No difficulty expected and Large neck circumference  Dental    (+) partials    Comment: Upper partial remover    Pulmonary     breath sounds clear to auscultation  (+) ,shortness of breathSleep apnea: snores but never officially dx'ed with BAUTISTA.  Cardiovascular - normal exam  Exercise tolerance: good (4-7 METS)    ECG reviewed  Rhythm: regular  Rate: normal    (+) hypertension well controlled less than 2 medications, hyperlipidemia      Neuro/Psych  (+) numbness  GI/Hepatic/Renal/Endo    (+) obesity    Musculoskeletal     Abdominal    Substance History      OB/GYN          Other   arthritis,   history of cancer    ROS/Med Hx Other: Progress Note    HEART RATE= 61  bpm  RR Interval= 984  ms  AZ Interval= 167  ms  P Horizontal Axis= 12  deg  P Front Axis= 64  deg  QRSD Interval= 111  ms  QT Interval= 410  ms  QRS Axis= 88  deg  T Wave Axis= 26  deg  - BORDERLINE ECG -  Sinus rhythm  Consider right ventricular hypertrophy  Intraventricular conduction delay  NO SIGNIFICANT CHANGE FROM PREVIOUS ECG  Electronically Signed By: Colton Gomez ([facility]) 21-Jan-2022 10:44:47  Date and Time of Study: 2022-01-21 10:19:44    Cervical radiculopathy C6                Anesthesia Plan    ASA 3     general     (Total IV Anesthesia    Patient understands anesthesia not responsible for dental damage.  )  intravenous induction     Anesthetic plan, risks, benefits, and alternatives have been provided, discussed and informed consent has been obtained with: patient.  Pre-procedure education provided  Plan discussed with CRNA.      CODE STATUS:

## 2024-04-11 ENCOUNTER — ANESTHESIA (OUTPATIENT)
Dept: GASTROENTEROLOGY | Facility: HOSPITAL | Age: 74
End: 2024-04-11
Payer: MEDICARE

## 2024-04-11 ENCOUNTER — HOSPITAL ENCOUNTER (OUTPATIENT)
Facility: HOSPITAL | Age: 74
Setting detail: HOSPITAL OUTPATIENT SURGERY
Discharge: HOME | End: 2024-04-11
Attending: SURGERY | Admitting: SURGERY
Payer: MEDICARE

## 2024-04-11 VITALS
SYSTOLIC BLOOD PRESSURE: 114 MMHG | HEART RATE: 61 BPM | OXYGEN SATURATION: 93 % | WEIGHT: 233.25 LBS | BODY MASS INDEX: 36.53 KG/M2 | DIASTOLIC BLOOD PRESSURE: 72 MMHG | TEMPERATURE: 98.1 F | RESPIRATION RATE: 20 BRPM

## 2024-04-11 DIAGNOSIS — Z12.11 SCREENING FOR MALIGNANT NEOPLASM OF COLON: ICD-10-CM

## 2024-04-11 PROCEDURE — 25010000002 PROPOFOL 10 MG/ML EMULSION: Performed by: NURSE ANESTHETIST, CERTIFIED REGISTERED

## 2024-04-11 PROCEDURE — 25810000003 LACTATED RINGERS PER 1000 ML: Performed by: NURSE ANESTHETIST, CERTIFIED REGISTERED

## 2024-04-11 RX ORDER — SODIUM CHLORIDE 0.9 % (FLUSH) 0.9 %
10 SYRINGE (ML) INJECTION AS NEEDED
Status: DISCONTINUED | OUTPATIENT
Start: 2024-04-11 | End: 2024-04-11 | Stop reason: HOSPADM

## 2024-04-11 RX ORDER — SODIUM CHLORIDE 9 MG/ML
40 INJECTION, SOLUTION INTRAVENOUS AS NEEDED
Status: DISCONTINUED | OUTPATIENT
Start: 2024-04-11 | End: 2024-04-11 | Stop reason: HOSPADM

## 2024-04-11 RX ORDER — LIDOCAINE HYDROCHLORIDE 20 MG/ML
INJECTION, SOLUTION EPIDURAL; INFILTRATION; INTRACAUDAL; PERINEURAL AS NEEDED
Status: DISCONTINUED | OUTPATIENT
Start: 2024-04-11 | End: 2024-04-11 | Stop reason: SURG

## 2024-04-11 RX ORDER — PROPOFOL 10 MG/ML
VIAL (ML) INTRAVENOUS AS NEEDED
Status: DISCONTINUED | OUTPATIENT
Start: 2024-04-11 | End: 2024-04-11 | Stop reason: SURG

## 2024-04-11 RX ORDER — SODIUM CHLORIDE 0.9 % (FLUSH) 0.9 %
3 SYRINGE (ML) INJECTION EVERY 12 HOURS SCHEDULED
Status: DISCONTINUED | OUTPATIENT
Start: 2024-04-11 | End: 2024-04-11 | Stop reason: HOSPADM

## 2024-04-11 RX ORDER — SODIUM CHLORIDE, SODIUM LACTATE, POTASSIUM CHLORIDE, CALCIUM CHLORIDE 600; 310; 30; 20 MG/100ML; MG/100ML; MG/100ML; MG/100ML
30 INJECTION, SOLUTION INTRAVENOUS CONTINUOUS
Status: DISCONTINUED | OUTPATIENT
Start: 2024-04-11 | End: 2024-04-11 | Stop reason: HOSPADM

## 2024-04-11 RX ADMIN — LIDOCAINE HYDROCHLORIDE 40 MG: 20 INJECTION, SOLUTION INTRAVENOUS at 09:01

## 2024-04-11 RX ADMIN — PROPOFOL 250 MCG/KG/MIN: 10 INJECTION, EMULSION INTRAVENOUS at 09:01

## 2024-04-11 RX ADMIN — SODIUM CHLORIDE, POTASSIUM CHLORIDE, SODIUM LACTATE AND CALCIUM CHLORIDE 30 ML/HR: 600; 310; 30; 20 INJECTION, SOLUTION INTRAVENOUS at 08:40

## 2024-04-11 RX ADMIN — PROPOFOL 50 MG: 10 INJECTION, EMULSION INTRAVENOUS at 09:01

## 2024-04-11 NOTE — H&P
General Surgery/Colorectal Surgery Note    Patient Name:  Uriah Perez  YOB: 1950  [id number]    Referring Provider: Silviano Starks, *      Patient Care Team:  Lorenzo Brand MD as PCP - General (Internal Medicine)    Subjective .     History of present illness:    HPI   referral from Dr. Edgardo Brand for colonoscopy consultation.  Patient denies any abdominal pain, change in bowel habit, or rectal bleeding.  Denies any family history of colorectal cancer.  Reports his last colonoscopy was 11+ years ago with Dr. Chakraborty and was normal.     History:  Past Medical History:   Diagnosis Date    Arthritis     GENERALIZED    At risk for sleep apnea     Disorder of bilirubin metabolism, unspecified     Disorder of white blood cells, unspecified     Eczema     SCALP AREA    Elevated cholesterol     Elevated PSA     History of 2019 novel coronavirus disease (COVID-19)     NOV 1, 2020 ([facility])    Hypercholesteremia     Hyperlipidemia     Hypertension     IFG (impaired fasting glucose)     Lymphoma     Pre-diabetes     Prostate cancer     Pure hypercholesterolemia     Urgency of urination     Vitamin D deficiency        Past Surgical History:   Procedure Laterality Date    BICEPS TENDON REPAIR Left 2018    COLONOSCOPY  2010    CYSTOSCOPY URETHROTOMY VISUAL INTERNAL N/A 9/2/2021    Procedure: CYSTOSCOPY WITH INTERNAL URETHROTOMY;  Surgeon: Alberto Duran Jr., MD;  Location: [facility];  Service: Urology;  Laterality: N/A;    INGUINAL HERNIA REPAIR Left     OTHER SURGICAL HISTORY  02/2018    ligament repair    PROSTATECTOMY N/A 1/28/2021    Procedure: DAVINCI PROSTATECTOMY LAPAROSCOPIC WITH DILATERAL PELVIC LYMPH NODE DISSECTION;  Surgeon: Alberto Duran Jr., MD;  Location: [facility] OR;  Service: DaVinci;  Laterality: N/A;    TONSILLECTOMY  1957    UMBILICAL HERNIA REPAIR      WRIST SURGERY Left 1976       Family History   Problem Relation Age of Onset    Cancer  Mother     Arthritis Mother     Diabetes Father     Cancer Father     Arthritis Father     Diabetes Sister         Type 2    Diabetes Brother         Type 2    Malig Hyperthermia Neg Hx        Social History     Tobacco Use    Smoking status: Light Smoker     Current packs/day: 0.00     Types: Cigarettes    Smokeless tobacco: Former     Quit date: 1991    Tobacco comments:     once a month   Vaping Use    Vaping status: Never Used   Substance Use Topics    Alcohol use: Yes     Alcohol/week: 4.0 standard drinks of alcohol     Types: 2 Cans of beer, 2 Shots of liquor per week     Comment: WEEKLY    Drug use: Not Currently     Types: Marijuana       Review of Systems: See HPI    Review of Systems            Medications Prior to Admission   Medication Sig Dispense Refill Last Dose    albuterol sulfate  (90 Base) MCG/ACT inhaler Inhale 2 puffs Every 4 (Four) Hours As Needed for Wheezing or Shortness of Air. 8 g 0     aspirin 81 MG EC tablet Take 1 tablet by mouth Daily.       COENZYME Q10 PO Take  by mouth.       multivitamin with minerals tablet tablet Take 1 tablet by mouth Daily.       Omega-3 Fatty Acids (fish oil) 1000 MG capsule capsule Take  by mouth Daily With Breakfast.       rosuvastatin (CRESTOR) 10 MG tablet Take 1 tablet by mouth every night at bedtime. 90 tablet 3     sodium-potassium-magnesium sulfates (Suprep Bowel Prep Kit) 17.5-3.13-1.6 GM/177ML solution oral solution Take as directed.  Instructions given in office.  Dispense: 2 bottles 354 mL 0     valsartan-hydrochlorothiazide (DIOVAN-HCT) 320-12.5 MG per tablet TAKE ONE TABLET BY MOUTH DAILY 90 tablet 3          Home Meds:      Prior to Admission medications    Medication Sig Start Date End Date Taking? Authorizing Provider   albuterol sulfate  (90 Base) MCG/ACT inhaler Inhale 2 puffs Every 4 (Four) Hours As Needed for Wheezing or Shortness of Air. 5/28/22   Jeanine Hills DO   aspirin 81 MG EC tablet Take 1 tablet by mouth Daily.     ProviderEstephania MD   COENZYME Q10 PO Take  by mouth.    Estephania Wilson MD   multivitamin with minerals tablet tablet Take 1 tablet by mouth Daily.    Estephania Wilson MD   Omega-3 Fatty Acids (fish oil) 1000 MG capsule capsule Take  by mouth Daily With Breakfast.    Estephania Wilson MD   rosuvastatin (CRESTOR) 10 MG tablet Take 1 tablet by mouth every night at bedtime. 8/17/23   Lorenzo Brand MD   sodium-potassium-magnesium sulfates (Suprep Bowel Prep Kit) 17.5-3.13-1.6 GM/177ML solution oral solution Take as directed.  Instructions given in office.  Dispense: 2 bottles 12/18/23   Sukhi April, APRN   valsartan-hydrochlorothiazide (DIOVAN-HCT) 320-12.5 MG per tablet TAKE ONE TABLET BY MOUTH DAILY 5/3/23   Lorenzo Brand MD            Allergies:  Patient has no known allergies.      Objective     Vital Signs        Physical Exam:     Physical Exam    NAD, A/O x 4, normal circulation, normal respiration      Result Review :     Assessment & Plan     There are no diagnoses linked to this encounter.       Risks including bleeding, perforation, pain, infection, missed polyp(s). Benefits, and alternatives of Colonoscopy discussed with patient.  All questions answered.  Consent verified.         Silviano Starks MD  04/11/24 08:08 EDT

## 2024-04-11 NOTE — ANESTHESIA POSTPROCEDURE EVALUATION
Patient: Uriah Perez    Procedure Summary       Date: 04/11/24 Room / Location: [facility] ENDOSCOPY 3 / [facility] ENDOSCOPY    Anesthesia Start: 0900 Anesthesia Stop: 0919    Procedure: COLONOSCOPY Diagnosis:       Screening for malignant neoplasm of colon      (Screening for malignant neoplasm of colon [Z12.11])    Surgeons: Silviano Starks MD Provider: Radha Montgomery CRNA    Anesthesia Type: general ASA Status: 3            Anesthesia Type: general    Vitals  Vitals Value Taken Time   /72 04/11/24 0933   Temp 36.7 °C (98.1 °F) 04/11/24 0919   Pulse 58 04/11/24 0935   Resp 20 04/11/24 0933   SpO2 94 % 04/11/24 0935   Vitals shown include unfiled device data.        Post Anesthesia Care and Evaluation    Patient location during evaluation: bedside  Patient participation: complete - patient participated  Level of consciousness: awake  Pain management: adequate    Airway patency: patent  Anesthetic complications: No anesthetic complications  PONV Status: controlled  Cardiovascular status: acceptable and stable  Respiratory status: acceptable

## 2024-05-10 RX ORDER — VALSARTAN AND HYDROCHLOROTHIAZIDE 320; 12.5 MG/1; MG/1
TABLET, FILM COATED ORAL
Qty: 90 TABLET | Refills: 3 | Status: SHIPPED | OUTPATIENT
Start: 2024-05-10

## 2024-05-13 ENCOUNTER — LAB (OUTPATIENT)
Dept: INTERNAL MEDICINE | Age: 74
End: 2024-05-13
Payer: MEDICARE

## 2024-05-13 DIAGNOSIS — E66.09 CLASS 1 OBESITY DUE TO EXCESS CALORIES WITHOUT SERIOUS COMORBIDITY WITH BODY MASS INDEX (BMI) OF 34.0 TO 34.9 IN ADULT: ICD-10-CM

## 2024-05-13 DIAGNOSIS — Z00.00 MEDICARE ANNUAL WELLNESS VISIT, SUBSEQUENT: ICD-10-CM

## 2024-05-13 DIAGNOSIS — Z12.11 SCREENING FOR COLON CANCER: ICD-10-CM

## 2024-05-13 DIAGNOSIS — R73.01 IFG (IMPAIRED FASTING GLUCOSE): ICD-10-CM

## 2024-05-13 DIAGNOSIS — C61 PROSTATE CANCER: ICD-10-CM

## 2024-05-13 DIAGNOSIS — C85.96 LYMPHOMA OF LYMPH NODES IN PELVIS, UNSPECIFIED LYMPHOMA TYPE: ICD-10-CM

## 2024-05-13 DIAGNOSIS — E78.2 MIXED HYPERLIPIDEMIA: ICD-10-CM

## 2024-05-13 DIAGNOSIS — I10 HYPERTENSION, ESSENTIAL: ICD-10-CM

## 2024-05-13 LAB
ALBUMIN SERPL-MCNC: 4.5 G/DL (ref 3.5–5.2)
ALBUMIN/GLOB SERPL: 2 G/DL
ALP SERPL-CCNC: 62 U/L (ref 39–117)
ALT SERPL W P-5'-P-CCNC: 28 U/L (ref 1–41)
ANION GAP SERPL CALCULATED.3IONS-SCNC: 12 MMOL/L (ref 5–15)
AST SERPL-CCNC: 23 U/L (ref 1–40)
BASOPHILS # BLD AUTO: 0.15 10*3/MM3 (ref 0–0.2)
BASOPHILS NFR BLD AUTO: 1 % (ref 0–1.5)
BILIRUB SERPL-MCNC: 1.8 MG/DL (ref 0–1.2)
BUN SERPL-MCNC: 27 MG/DL (ref 8–23)
BUN/CREAT SERPL: 20.5 (ref 7–25)
CALCIUM SPEC-SCNC: 10.1 MG/DL (ref 8.6–10.5)
CHLORIDE SERPL-SCNC: 104 MMOL/L (ref 98–107)
CHOLEST SERPL-MCNC: 192 MG/DL (ref 0–200)
CO2 SERPL-SCNC: 25 MMOL/L (ref 22–29)
CREAT SERPL-MCNC: 1.32 MG/DL (ref 0.76–1.27)
DEPRECATED RDW RBC AUTO: 40.4 FL (ref 37–54)
EGFRCR SERPLBLD CKD-EPI 2021: 57.7 ML/MIN/1.73
EOSINOPHIL # BLD AUTO: 0.21 10*3/MM3 (ref 0–0.4)
EOSINOPHIL NFR BLD AUTO: 1.4 % (ref 0.3–6.2)
ERYTHROCYTE [DISTWIDTH] IN BLOOD BY AUTOMATED COUNT: 12.3 % (ref 12.3–15.4)
GLOBULIN UR ELPH-MCNC: 2.2 GM/DL
GLUCOSE SERPL-MCNC: 123 MG/DL (ref 65–99)
HBA1C MFR BLD: 6 % (ref 4.8–5.6)
HCT VFR BLD AUTO: 46.3 % (ref 37.5–51)
HDLC SERPL-MCNC: 66 MG/DL (ref 40–60)
HGB BLD-MCNC: 15.3 G/DL (ref 13–17.7)
IMM GRANULOCYTES # BLD AUTO: 0.08 10*3/MM3 (ref 0–0.05)
IMM GRANULOCYTES NFR BLD AUTO: 0.5 % (ref 0–0.5)
LDLC SERPL CALC-MCNC: 109 MG/DL (ref 0–100)
LDLC/HDLC SERPL: 1.62 {RATIO}
LYMPHOCYTES # BLD AUTO: 5.17 10*3/MM3 (ref 0.7–3.1)
LYMPHOCYTES NFR BLD AUTO: 34.8 % (ref 19.6–45.3)
MCH RBC QN AUTO: 29.9 PG (ref 26.6–33)
MCHC RBC AUTO-ENTMCNC: 33 G/DL (ref 31.5–35.7)
MCV RBC AUTO: 90.6 FL (ref 79–97)
MONOCYTES # BLD AUTO: 1.01 10*3/MM3 (ref 0.1–0.9)
MONOCYTES NFR BLD AUTO: 6.8 % (ref 5–12)
NEUTROPHILS NFR BLD AUTO: 55.5 % (ref 42.7–76)
NEUTROPHILS NFR BLD AUTO: 8.23 10*3/MM3 (ref 1.7–7)
NRBC BLD AUTO-RTO: 0 /100 WBC (ref 0–0.2)
PLATELET # BLD AUTO: 290 10*3/MM3 (ref 140–450)
PMV BLD AUTO: 11.7 FL (ref 6–12)
POTASSIUM SERPL-SCNC: 4.6 MMOL/L (ref 3.5–5.2)
PROT SERPL-MCNC: 6.7 G/DL (ref 6–8.5)
RBC # BLD AUTO: 5.11 10*6/MM3 (ref 4.14–5.8)
SODIUM SERPL-SCNC: 141 MMOL/L (ref 136–145)
T4 FREE SERPL-MCNC: 1.29 NG/DL (ref 0.93–1.7)
TRIGL SERPL-MCNC: 96 MG/DL (ref 0–150)
TSH SERPL DL<=0.05 MIU/L-ACNC: 2.21 UIU/ML (ref 0.27–4.2)
VLDLC SERPL-MCNC: 17 MG/DL (ref 5–40)
WBC NRBC COR # BLD AUTO: 14.85 10*3/MM3 (ref 3.4–10.8)

## 2024-05-13 PROCEDURE — 84443 ASSAY THYROID STIM HORMONE: CPT | Performed by: INTERNAL MEDICINE

## 2024-05-13 PROCEDURE — 80053 COMPREHEN METABOLIC PANEL: CPT | Performed by: INTERNAL MEDICINE

## 2024-05-13 PROCEDURE — 85025 COMPLETE CBC W/AUTO DIFF WBC: CPT | Performed by: INTERNAL MEDICINE

## 2024-05-13 PROCEDURE — 80061 LIPID PANEL: CPT | Performed by: INTERNAL MEDICINE

## 2024-05-13 PROCEDURE — 83036 HEMOGLOBIN GLYCOSYLATED A1C: CPT | Performed by: INTERNAL MEDICINE

## 2024-05-13 PROCEDURE — 84439 ASSAY OF FREE THYROXINE: CPT | Performed by: INTERNAL MEDICINE

## 2024-05-13 PROCEDURE — 36415 COLL VENOUS BLD VENIPUNCTURE: CPT | Performed by: INTERNAL MEDICINE

## 2024-05-20 ENCOUNTER — OFFICE VISIT (OUTPATIENT)
Dept: INTERNAL MEDICINE | Age: 74
End: 2024-05-20
Payer: MEDICARE

## 2024-05-20 VITALS
DIASTOLIC BLOOD PRESSURE: 75 MMHG | WEIGHT: 242 LBS | TEMPERATURE: 98.2 F | SYSTOLIC BLOOD PRESSURE: 147 MMHG | BODY MASS INDEX: 37.98 KG/M2 | HEART RATE: 65 BPM | HEIGHT: 67 IN | OXYGEN SATURATION: 93 %

## 2024-05-20 DIAGNOSIS — E78.2 MIXED HYPERLIPIDEMIA: ICD-10-CM

## 2024-05-20 DIAGNOSIS — R73.01 IFG (IMPAIRED FASTING GLUCOSE): Primary | ICD-10-CM

## 2024-05-20 DIAGNOSIS — Z12.11 SCREENING FOR COLON CANCER: ICD-10-CM

## 2024-05-20 DIAGNOSIS — I10 HYPERTENSION, ESSENTIAL: ICD-10-CM

## 2024-05-20 DIAGNOSIS — C85.96 LYMPHOMA OF LYMPH NODES IN PELVIS, UNSPECIFIED LYMPHOMA TYPE: ICD-10-CM

## 2024-05-20 DIAGNOSIS — C61 PROSTATE CANCER: ICD-10-CM

## 2024-05-20 DIAGNOSIS — M54.12 CERVICAL RADICULOPATHY AT C6: ICD-10-CM

## 2024-05-20 DIAGNOSIS — E66.09 CLASS 1 OBESITY DUE TO EXCESS CALORIES WITHOUT SERIOUS COMORBIDITY WITH BODY MASS INDEX (BMI) OF 34.0 TO 34.9 IN ADULT: ICD-10-CM

## 2024-05-20 PROCEDURE — 3044F HG A1C LEVEL LT 7.0%: CPT | Performed by: INTERNAL MEDICINE

## 2024-05-20 PROCEDURE — 3078F DIAST BP <80 MM HG: CPT | Performed by: INTERNAL MEDICINE

## 2024-05-20 PROCEDURE — 99214 OFFICE O/P EST MOD 30 MIN: CPT | Performed by: INTERNAL MEDICINE

## 2024-05-20 PROCEDURE — 3077F SYST BP >= 140 MM HG: CPT | Performed by: INTERNAL MEDICINE

## 2024-05-20 PROCEDURE — 1170F FXNL STATUS ASSESSED: CPT | Performed by: INTERNAL MEDICINE

## 2024-05-20 RX ORDER — COLCHICINE 0.6 MG/1
0.6 TABLET ORAL 2 TIMES DAILY
Qty: 60 TABLET | Refills: 5 | Status: SHIPPED | OUTPATIENT
Start: 2024-05-20

## 2024-05-20 RX ORDER — METHYLPREDNISOLONE 4 MG/1
TABLET ORAL
Qty: 21 EACH | Refills: 0 | Status: SHIPPED | OUTPATIENT
Start: 2024-05-20 | End: 2024-05-25

## 2024-05-20 RX ORDER — NAPROXEN 500 MG/1
500 TABLET ORAL 2 TIMES DAILY WITH MEALS
Qty: 60 TABLET | Refills: 5 | Status: SHIPPED | OUTPATIENT
Start: 2024-05-20

## 2024-05-20 NOTE — PROGRESS NOTES
"CHIEF COMPLAINT/ HPI:  Hyperlipidemia (Routine follow up, lab follow up. Pt states rt foot joint are sore ,unsure if it is gout or arthritis. )              Objective   Vital Signs  Vitals:    05/20/24 0951   BP: 147/75   Pulse: 65   Temp: 98.2 °F (36.8 °C)   SpO2: 93%   Weight: 110 kg (242 lb)   Height: 170.2 cm (67.01\")      Body mass index is 37.89 kg/m².  Review of Systems   Constitutional: Negative.    HENT: Negative.     Eyes: Negative.    Respiratory: Negative.     Cardiovascular: Negative.    Gastrointestinal: Negative.    Endocrine: Negative.    Genitourinary: Negative.    Musculoskeletal: Negative.    Allergic/Immunologic: Negative.    Neurological: Negative.    Hematological: Negative.    Psychiatric/Behavioral: Negative.        Physical Exam  Constitutional:       General: He is not in acute distress.     Appearance: Normal appearance. He is obese.   HENT:      Head: Normocephalic.      Mouth/Throat:      Mouth: Mucous membranes are moist.   Eyes:      Conjunctiva/sclera: Conjunctivae normal.      Pupils: Pupils are equal, round, and reactive to light.   Cardiovascular:      Rate and Rhythm: Normal rate and regular rhythm.      Pulses: Normal pulses.      Heart sounds: Normal heart sounds.   Pulmonary:      Effort: Pulmonary effort is normal.      Breath sounds: Normal breath sounds.   Abdominal:      General: Bowel sounds are normal.      Palpations: Abdomen is soft.   Musculoskeletal:         General: No swelling. Normal range of motion.      Cervical back: Neck supple.   Skin:     General: Skin is warm and dry.      Coloration: Skin is not jaundiced.   Neurological:      General: No focal deficit present.      Mental Status: He is alert and oriented to person, place, and time. Mental status is at baseline.   Psychiatric:         Mood and Affect: Mood normal.         Behavior: Behavior normal.         Thought Content: Thought content normal.         Judgment: Judgment normal.        Result Review : " "  No results found for: \"PROBNP\", \"BNP\"  CMP          11/13/2023    09:22 5/13/2024    10:48   CMP   Glucose 123  123    BUN 21  27    Creatinine 0.93  1.32    EGFR 87.8  57.7    Sodium 140  141    Potassium 4.8  4.6    Chloride 103  104    Calcium 10.0  10.1    Total Protein 6.9  6.7    Albumin 4.3  4.5    Globulin 2.6  2.2    Total Bilirubin 0.9  1.8    Alkaline Phosphatase 70  62    AST (SGOT) 20  23    ALT (SGPT) 21  28    Albumin/Globulin Ratio 1.7  2.0    BUN/Creatinine Ratio 22.6  20.5    Anion Gap 8.5  12.0      CBC w/diff          8/29/2023    14:09 11/13/2023    09:22 5/13/2024    10:48   CBC w/Diff   WBC 12.43     9.78  14.85    RBC 4.77     5.30  5.11    Hemoglobin 14.5     15.8  15.3    Hematocrit 44.2     47.4  46.3    MCV 92.7     89.4  90.6    MCH 30.4     29.8  29.9    MCHC 32.8     33.3  33.0    RDW 12.8     12.1  12.3    Platelets 232     293  290    Neutrophil Rel % 55.5     46.9  55.5    Immature Granulocyte Rel % 0.2     0.5  0.5    Lymphocyte Rel % 34.3     40.8  34.8    Monocyte Rel % 7.2     7.3  6.8    Eosinophil Rel % 2.2     3.6  1.4    Basophil Rel % 0.6     0.9  1.0       Details          This result is from an external source.              Lipid Panel          11/13/2023    09:22 5/13/2024    10:48   Lipid Panel   Total Cholesterol 194  192    Triglycerides 113  96    HDL Cholesterol 58  66    VLDL Cholesterol 20  17    LDL Cholesterol  116  109    LDL/HDL Ratio 1.96  1.62       Lab Results   Component Value Date    TSH 2.210 05/13/2024    TSH 3.450 05/09/2023    TSH 3.080 05/05/2022      Lab Results   Component Value Date    FREET4 1.29 05/13/2024    FREET4 1.10 05/09/2023      A1C Last 3 Results          7/18/2023    04:24 11/13/2023    09:22 5/13/2024    10:48   HGBA1C Last 3 Results   Hemoglobin A1C 5.8     5.90  6.00       Details          This result is from an external source.                               Visit Diagnoses:    ICD-10-CM ICD-9-CM   1. IFG (impaired fasting " glucose)  R73.01 790.21   2. Hypertension, essential  I10 401.9   3. Mixed hyperlipidemia  E78.2 272.2   4. Cervical radiculopathy at C6  M54.12 723.4   5. Lymphoma of lymph nodes in pelvis, unspecified lymphoma type  C85.96 202.86   6. Prostate cancer  C61 185   7. Class 1 obesity due to excess calories without serious comorbidity with body mass index (BMI) of 34.0 to 34.9 in adult  E66.09 278.00    Z68.34 V85.34   8. Screening for colon cancer  Z12.11 V76.51       Assessment and Plan   Diagnoses and all orders for this visit:    1. IFG (impaired fasting glucose) (Primary)  -     Bilirubin, Total & Direct; Future  -     Comprehensive Metabolic Panel; Future  -     Uric Acid; Future  -     Comprehensive Metabolic Panel; Future  -     CBC & Differential; Future  -     Uric Acid; Future  -     Lipid Panel; Future  -     Hemoglobin A1c; Future  -     PSA DIAGNOSTIC; Future  -     MicroAlbumin, Urine, Random - Urine, Clean Catch; Future  -     CBC & Differential; Future    2. Hypertension, essential  -     Bilirubin, Total & Direct; Future  -     Comprehensive Metabolic Panel; Future  -     Uric Acid; Future  -     Comprehensive Metabolic Panel; Future  -     CBC & Differential; Future  -     Uric Acid; Future  -     Lipid Panel; Future  -     Hemoglobin A1c; Future  -     PSA DIAGNOSTIC; Future  -     MicroAlbumin, Urine, Random - Urine, Clean Catch; Future  -     CBC & Differential; Future    3. Mixed hyperlipidemia  -     Bilirubin, Total & Direct; Future  -     Comprehensive Metabolic Panel; Future  -     Uric Acid; Future  -     Comprehensive Metabolic Panel; Future  -     CBC & Differential; Future  -     Uric Acid; Future  -     Lipid Panel; Future  -     Hemoglobin A1c; Future  -     PSA DIAGNOSTIC; Future  -     MicroAlbumin, Urine, Random - Urine, Clean Catch; Future  -     CBC & Differential; Future    4. Cervical radiculopathy at C6  -     Bilirubin, Total & Direct; Future  -     Comprehensive Metabolic  Panel; Future  -     Uric Acid; Future  -     Comprehensive Metabolic Panel; Future  -     CBC & Differential; Future  -     Uric Acid; Future  -     Lipid Panel; Future  -     Hemoglobin A1c; Future  -     PSA DIAGNOSTIC; Future  -     MicroAlbumin, Urine, Random - Urine, Clean Catch; Future  -     CBC & Differential; Future    5. Lymphoma of lymph nodes in pelvis, unspecified lymphoma type  -     Bilirubin, Total & Direct; Future  -     Comprehensive Metabolic Panel; Future  -     Uric Acid; Future  -     Comprehensive Metabolic Panel; Future  -     CBC & Differential; Future  -     Uric Acid; Future  -     Lipid Panel; Future  -     Hemoglobin A1c; Future  -     PSA DIAGNOSTIC; Future  -     MicroAlbumin, Urine, Random - Urine, Clean Catch; Future  -     CBC & Differential; Future    6. Prostate cancer  -     Bilirubin, Total & Direct; Future  -     Comprehensive Metabolic Panel; Future  -     Uric Acid; Future  -     Comprehensive Metabolic Panel; Future  -     CBC & Differential; Future  -     Uric Acid; Future  -     Lipid Panel; Future  -     Hemoglobin A1c; Future  -     PSA DIAGNOSTIC; Future  -     MicroAlbumin, Urine, Random - Urine, Clean Catch; Future  -     CBC & Differential; Future    7. Class 1 obesity due to excess calories without serious comorbidity with body mass index (BMI) of 34.0 to 34.9 in adult  -     Bilirubin, Total & Direct; Future  -     Comprehensive Metabolic Panel; Future  -     Uric Acid; Future  -     Comprehensive Metabolic Panel; Future  -     CBC & Differential; Future  -     Uric Acid; Future  -     Lipid Panel; Future  -     Hemoglobin A1c; Future  -     PSA DIAGNOSTIC; Future  -     MicroAlbumin, Urine, Random - Urine, Clean Catch; Future  -     CBC & Differential; Future    8. Screening for colon cancer  -     Bilirubin, Total & Direct; Future  -     Comprehensive Metabolic Panel; Future  -     Uric Acid; Future  -     Comprehensive Metabolic Panel; Future  -     CBC &  Differential; Future  -     Uric Acid; Future  -     Lipid Panel; Future  -     Hemoglobin A1c; Future  -     PSA DIAGNOSTIC; Future  -     MicroAlbumin, Urine, Random - Urine, Clean Catch; Future  -     CBC & Differential; Future    Other orders  -     methylPREDNISolone (MEDROL) 4 MG dose pack; Take as directed on package instructions.  Dispense: 21 each; Refill: 0  -     colchicine 0.6 MG tablet; Take 1 tablet by mouth 2 (Two) Times a Day.  Dispense: 60 tablet; Refill: 5  -     naproxen (Naprosyn) 500 MG tablet; Take 1 tablet by mouth 2 (Two) Times a Day With Meals.  Dispense: 60 tablet; Refill: 5      Colonoscopy Dr. Starks April 11, 2024 normal,    R foot / mtp area pain --treatment options discussed May 20, 2024 consider a Medrol Dosepak, some colchicine 0.6 twice a day and      shingrix completed nov 2022,  Pneumovax/ flu shot -- done sept 2022    Low-grade B-cell lymphoma, CT of the chest September 2022 shows no evidence of recurrence of lymphoma in the chest area, CT abdomen and pelvis shows no convincing evidence of recurrent lymphoma in the abdomen and pelvis, cholelithiasis, noted, discussed with patient November 16, 2022 continues follow-up     TOTAL PROSTATECTOMY JAN 2021, WITH POSITIVE LYMPH NODES FOR LOW GRADE B CELL LYMPHOMA -----patient had a follow-up TURP for scar tissue removal, is now having to intermittently straight cath to avoid recurrence of scarring, as of May 2022 every 4 to 5 days,, continues November 2022, patient does have follow-up with Dr. Agus Christianson, current white count is normal May 2023,    ELEVATED WBC ,-- OCT 2019, --agus christianson does f/u -- white count fluctuates from 9-14,000 -- diagnosis of low-grade B-cell lymphoma,, found on prostatectomy with lymph node dissection,, diagnosed January 2021,, currently no treatment following clinically, current white count down to 9.7 November 13, 2023    Right neck pain, with radiculopathy in the C6-C7 possibly C5-C6 distribution, --- MRI,  neurosurgical referral, physical therapy for possible traction,. --BETTER, DID SEE DR VELIZ currently not an issue November 2022     OVERWGT,, weight back up November 2023 continue weight loss efforts,     IFG ,HGA1C = hemoglobin A1c up to 6.0 May 13, 2024,, urine microalbumin was normal November 13, 2023    elevated bilirubin most likely [place]-- isolated, previously elevated at 1.3 in the past several years, normal, November 13, 2023--now up to 1.8, may 2024, will recheck, June 2024     htn --continue valsartan 320/25 mg  daily    elevated chol--continues Crestor 20 mg, and l co-Q10     psa per urology, ==6.2--BX POSITIVE CA,----UP TO 8.5 --BX --SEPT, 2020,------DR RIVERA==JAN 2021, ==========TOTAL PROSTATECTOMY-----still staight cath q 3-4 days,   Had cysto April 2023,  Getting artifical sphincter placement  July 2023,   ---psa undetectable may 2023--    Colonoscopy screening, general surgery December 2023, pending             Follow Up   Return in about 6 months (around 11/20/2024).  Patient was given instructions and counseling regarding his condition or for health maintenance advice. Please see specific information pulled into the AVS if appropriate.           Answers submitted by the patient for this visit:  Primary Reason for Visit (Submitted on 5/13/2024)  What is the primary reason for your visit?: Other  Other (Submitted on 5/13/2024)  Please describe your symptoms.: None  Have you had these symptoms before?: Yes  How long have you been having these symptoms?: Greater than 2 weeks  Please list any medications you are currently taking for this condition.: None  Please describe any probable cause for these symptoms. : None

## 2024-06-03 ENCOUNTER — LAB (OUTPATIENT)
Dept: LAB | Facility: HOSPITAL | Age: 74
End: 2024-06-03
Payer: MEDICARE

## 2024-06-03 DIAGNOSIS — E66.09 CLASS 1 OBESITY DUE TO EXCESS CALORIES WITHOUT SERIOUS COMORBIDITY WITH BODY MASS INDEX (BMI) OF 34.0 TO 34.9 IN ADULT: ICD-10-CM

## 2024-06-03 DIAGNOSIS — C85.96 LYMPHOMA OF LYMPH NODES IN PELVIS, UNSPECIFIED LYMPHOMA TYPE: ICD-10-CM

## 2024-06-03 DIAGNOSIS — R73.01 IFG (IMPAIRED FASTING GLUCOSE): ICD-10-CM

## 2024-06-03 DIAGNOSIS — Z12.11 SCREENING FOR COLON CANCER: ICD-10-CM

## 2024-06-03 DIAGNOSIS — E78.2 MIXED HYPERLIPIDEMIA: ICD-10-CM

## 2024-06-03 DIAGNOSIS — C61 PROSTATE CANCER: ICD-10-CM

## 2024-06-03 DIAGNOSIS — I10 HYPERTENSION, ESSENTIAL: ICD-10-CM

## 2024-06-03 DIAGNOSIS — M54.12 CERVICAL RADICULOPATHY AT C6: ICD-10-CM

## 2024-06-03 LAB
ALBUMIN SERPL-MCNC: 4 G/DL (ref 3.5–5.2)
ALBUMIN/GLOB SERPL: 1.9 G/DL
ALP SERPL-CCNC: 53 U/L (ref 39–117)
ALT SERPL W P-5'-P-CCNC: 27 U/L (ref 1–41)
ANION GAP SERPL CALCULATED.3IONS-SCNC: 9.6 MMOL/L (ref 5–15)
AST SERPL-CCNC: 19 U/L (ref 1–40)
BASOPHILS # BLD AUTO: 0.12 10*3/MM3 (ref 0–0.2)
BASOPHILS NFR BLD AUTO: 0.9 % (ref 0–1.5)
BILIRUB CONJ SERPL-MCNC: 0.2 MG/DL (ref 0–0.3)
BILIRUB SERPL-MCNC: 1.1 MG/DL (ref 0–1.2)
BUN SERPL-MCNC: 24 MG/DL (ref 8–23)
BUN/CREAT SERPL: 22.6 (ref 7–25)
CALCIUM SPEC-SCNC: 9.1 MG/DL (ref 8.6–10.5)
CHLORIDE SERPL-SCNC: 107 MMOL/L (ref 98–107)
CO2 SERPL-SCNC: 23.4 MMOL/L (ref 22–29)
CREAT SERPL-MCNC: 1.06 MG/DL (ref 0.76–1.27)
DEPRECATED RDW RBC AUTO: 41.7 FL (ref 37–54)
EGFRCR SERPLBLD CKD-EPI 2021: 75 ML/MIN/1.73
EOSINOPHIL # BLD AUTO: 0.36 10*3/MM3 (ref 0–0.4)
EOSINOPHIL NFR BLD AUTO: 2.6 % (ref 0.3–6.2)
ERYTHROCYTE [DISTWIDTH] IN BLOOD BY AUTOMATED COUNT: 12.6 % (ref 12.3–15.4)
GLOBULIN UR ELPH-MCNC: 2.1 GM/DL
GLUCOSE SERPL-MCNC: 117 MG/DL (ref 65–99)
HCT VFR BLD AUTO: 44.1 % (ref 37.5–51)
HGB BLD-MCNC: 14.8 G/DL (ref 13–17.7)
IMM GRANULOCYTES # BLD AUTO: 0.07 10*3/MM3 (ref 0–0.05)
IMM GRANULOCYTES NFR BLD AUTO: 0.5 % (ref 0–0.5)
LYMPHOCYTES # BLD AUTO: 6.05 10*3/MM3 (ref 0.7–3.1)
LYMPHOCYTES NFR BLD AUTO: 44.5 % (ref 19.6–45.3)
MCH RBC QN AUTO: 30.7 PG (ref 26.6–33)
MCHC RBC AUTO-ENTMCNC: 33.6 G/DL (ref 31.5–35.7)
MCV RBC AUTO: 91.5 FL (ref 79–97)
MONOCYTES # BLD AUTO: 0.89 10*3/MM3 (ref 0.1–0.9)
MONOCYTES NFR BLD AUTO: 6.5 % (ref 5–12)
NEUTROPHILS NFR BLD AUTO: 45 % (ref 42.7–76)
NEUTROPHILS NFR BLD AUTO: 6.12 10*3/MM3 (ref 1.7–7)
NRBC BLD AUTO-RTO: 0 /100 WBC (ref 0–0.2)
PLATELET # BLD AUTO: 210 10*3/MM3 (ref 140–450)
PMV BLD AUTO: 11.9 FL (ref 6–12)
POTASSIUM SERPL-SCNC: 4.1 MMOL/L (ref 3.5–5.2)
PROT SERPL-MCNC: 6.1 G/DL (ref 6–8.5)
RBC # BLD AUTO: 4.82 10*6/MM3 (ref 4.14–5.8)
SODIUM SERPL-SCNC: 140 MMOL/L (ref 136–145)
URATE SERPL-MCNC: 6.1 MG/DL (ref 3.4–7)
WBC NRBC COR # BLD AUTO: 13.61 10*3/MM3 (ref 3.4–10.8)

## 2024-06-03 PROCEDURE — 85025 COMPLETE CBC W/AUTO DIFF WBC: CPT

## 2024-06-03 PROCEDURE — 82248 BILIRUBIN DIRECT: CPT

## 2024-06-03 PROCEDURE — 36415 COLL VENOUS BLD VENIPUNCTURE: CPT

## 2024-06-03 PROCEDURE — 84550 ASSAY OF BLOOD/URIC ACID: CPT

## 2024-06-03 PROCEDURE — 80053 COMPREHEN METABOLIC PANEL: CPT

## 2024-09-04 RX ORDER — ROSUVASTATIN CALCIUM 10 MG/1
10 TABLET, COATED ORAL
Qty: 90 TABLET | Refills: 3 | Status: SHIPPED | OUTPATIENT
Start: 2024-09-04

## 2024-11-14 ENCOUNTER — LAB (OUTPATIENT)
Facility: HOSPITAL | Age: 74
End: 2024-11-14
Payer: MEDICARE

## 2024-11-14 DIAGNOSIS — E66.811 CLASS 1 OBESITY DUE TO EXCESS CALORIES WITHOUT SERIOUS COMORBIDITY WITH BODY MASS INDEX (BMI) OF 34.0 TO 34.9 IN ADULT: ICD-10-CM

## 2024-11-14 DIAGNOSIS — M54.12 CERVICAL RADICULOPATHY AT C6: ICD-10-CM

## 2024-11-14 DIAGNOSIS — C61 PROSTATE CANCER: ICD-10-CM

## 2024-11-14 DIAGNOSIS — C85.96 LYMPHOMA OF LYMPH NODES IN PELVIS, UNSPECIFIED LYMPHOMA TYPE: ICD-10-CM

## 2024-11-14 DIAGNOSIS — E78.2 MIXED HYPERLIPIDEMIA: ICD-10-CM

## 2024-11-14 DIAGNOSIS — I10 HYPERTENSION, ESSENTIAL: ICD-10-CM

## 2024-11-14 DIAGNOSIS — Z12.11 SCREENING FOR COLON CANCER: ICD-10-CM

## 2024-11-14 DIAGNOSIS — E66.09 CLASS 1 OBESITY DUE TO EXCESS CALORIES WITHOUT SERIOUS COMORBIDITY WITH BODY MASS INDEX (BMI) OF 34.0 TO 34.9 IN ADULT: ICD-10-CM

## 2024-11-14 DIAGNOSIS — R73.01 IFG (IMPAIRED FASTING GLUCOSE): ICD-10-CM

## 2024-11-14 LAB
ALBUMIN UR-MCNC: <1.2 MG/DL
ANISOCYTOSIS BLD QL: ABNORMAL
BASOPHILS # BLD MANUAL: 0.26 10*3/MM3 (ref 0–0.2)
BASOPHILS NFR BLD MANUAL: 2 % (ref 0–1.5)
CHOLEST SERPL-MCNC: 186 MG/DL (ref 0–200)
DEPRECATED RDW RBC AUTO: 41 FL (ref 37–54)
EOSINOPHIL # BLD MANUAL: 0.26 10*3/MM3 (ref 0–0.4)
EOSINOPHIL NFR BLD MANUAL: 2 % (ref 0.3–6.2)
ERYTHROCYTE [DISTWIDTH] IN BLOOD BY AUTOMATED COUNT: 12.6 % (ref 12.3–15.4)
HBA1C MFR BLD: 6.2 % (ref 4.8–5.6)
HCT VFR BLD AUTO: 44.8 % (ref 37.5–51)
HDLC SERPL-MCNC: 55 MG/DL (ref 40–60)
HGB BLD-MCNC: 14.6 G/DL (ref 13–17.7)
LDLC SERPL CALC-MCNC: 100 MG/DL (ref 0–100)
LDLC/HDLC SERPL: 1.72 {RATIO}
LYMPHOCYTES # BLD MANUAL: 5.11 10*3/MM3 (ref 0.7–3.1)
LYMPHOCYTES NFR BLD MANUAL: 3.1 % (ref 5–12)
MCH RBC QN AUTO: 29.6 PG (ref 26.6–33)
MCHC RBC AUTO-ENTMCNC: 32.6 G/DL (ref 31.5–35.7)
MCV RBC AUTO: 90.7 FL (ref 79–97)
MICROCYTES BLD QL: ABNORMAL
MONOCYTES # BLD: 0.41 10*3/MM3 (ref 0.1–0.9)
NEUTROPHILS # BLD AUTO: 7.12 10*3/MM3 (ref 1.7–7)
NEUTROPHILS NFR BLD MANUAL: 54.1 % (ref 42.7–76)
PLAT MORPH BLD: NORMAL
PLATELET # BLD AUTO: 241 10*3/MM3 (ref 140–450)
PMV BLD AUTO: 12.4 FL (ref 6–12)
PSA SERPL-MCNC: <0.014 NG/ML (ref 0–4)
RBC # BLD AUTO: 4.94 10*6/MM3 (ref 4.14–5.8)
SMUDGE CELLS BLD QL SMEAR: ABNORMAL
TRIGL SERPL-MCNC: 183 MG/DL (ref 0–150)
URATE SERPL-MCNC: 6.1 MG/DL (ref 3.4–7)
VARIANT LYMPHS NFR BLD MANUAL: 1 % (ref 0–5)
VARIANT LYMPHS NFR BLD MANUAL: 37.8 % (ref 19.6–45.3)
VLDLC SERPL-MCNC: 31 MG/DL (ref 5–40)
WBC NRBC COR # BLD AUTO: 13.17 10*3/MM3 (ref 3.4–10.8)

## 2024-11-14 PROCEDURE — 84153 ASSAY OF PSA TOTAL: CPT

## 2024-11-14 PROCEDURE — 80061 LIPID PANEL: CPT

## 2024-11-14 PROCEDURE — 82043 UR ALBUMIN QUANTITATIVE: CPT

## 2024-11-14 PROCEDURE — 84550 ASSAY OF BLOOD/URIC ACID: CPT

## 2024-11-14 PROCEDURE — 36415 COLL VENOUS BLD VENIPUNCTURE: CPT

## 2024-11-14 PROCEDURE — 85025 COMPLETE CBC W/AUTO DIFF WBC: CPT

## 2024-11-14 PROCEDURE — 83036 HEMOGLOBIN GLYCOSYLATED A1C: CPT

## 2024-11-14 PROCEDURE — 85007 BL SMEAR W/DIFF WBC COUNT: CPT

## 2024-11-19 ENCOUNTER — OFFICE VISIT (OUTPATIENT)
Dept: INTERNAL MEDICINE | Age: 74
End: 2024-11-19
Payer: MEDICARE

## 2024-11-19 VITALS
DIASTOLIC BLOOD PRESSURE: 69 MMHG | TEMPERATURE: 97.3 F | WEIGHT: 246.2 LBS | OXYGEN SATURATION: 93 % | BODY MASS INDEX: 37.31 KG/M2 | SYSTOLIC BLOOD PRESSURE: 131 MMHG | HEART RATE: 61 BPM | HEIGHT: 68 IN

## 2024-11-19 DIAGNOSIS — E66.811 CLASS 1 OBESITY DUE TO EXCESS CALORIES WITHOUT SERIOUS COMORBIDITY WITH BODY MASS INDEX (BMI) OF 34.0 TO 34.9 IN ADULT: ICD-10-CM

## 2024-11-19 DIAGNOSIS — C85.96 LYMPHOMA OF LYMPH NODES IN PELVIS, UNSPECIFIED LYMPHOMA TYPE: ICD-10-CM

## 2024-11-19 DIAGNOSIS — I10 HYPERTENSION, ESSENTIAL: ICD-10-CM

## 2024-11-19 DIAGNOSIS — E66.09 CLASS 1 OBESITY DUE TO EXCESS CALORIES WITHOUT SERIOUS COMORBIDITY WITH BODY MASS INDEX (BMI) OF 34.0 TO 34.9 IN ADULT: ICD-10-CM

## 2024-11-19 DIAGNOSIS — E78.2 MIXED HYPERLIPIDEMIA: ICD-10-CM

## 2024-11-19 DIAGNOSIS — C61 PROSTATE CANCER: ICD-10-CM

## 2024-11-19 DIAGNOSIS — R73.01 IFG (IMPAIRED FASTING GLUCOSE): Primary | ICD-10-CM

## 2024-11-19 DIAGNOSIS — Z12.11 SCREENING FOR COLON CANCER: ICD-10-CM

## 2024-11-19 PROCEDURE — 99214 OFFICE O/P EST MOD 30 MIN: CPT | Performed by: INTERNAL MEDICINE

## 2024-11-19 PROCEDURE — 3075F SYST BP GE 130 - 139MM HG: CPT | Performed by: INTERNAL MEDICINE

## 2024-11-19 PROCEDURE — 3044F HG A1C LEVEL LT 7.0%: CPT | Performed by: INTERNAL MEDICINE

## 2024-11-19 PROCEDURE — 3078F DIAST BP <80 MM HG: CPT | Performed by: INTERNAL MEDICINE

## 2024-11-19 NOTE — PROGRESS NOTES
"CHIEF COMPLAINT/ HPI:  Hyperlipidemia and Follow-up (Pt had labs, he has no new issues.)              Objective   Vital Signs  Vitals:    11/19/24 1056   BP: 131/69   Pulse: 61   Temp: 97.3 °F (36.3 °C)   TempSrc: Skin   SpO2: 93%   Weight: 112 kg (246 lb 3.2 oz)   Height: 172.7 cm (67.99\")      Body mass index is 37.44 kg/m².  Review of Systems   Constitutional: Negative.    HENT: Negative.     Eyes: Negative.    Respiratory: Negative.     Cardiovascular: Negative.    Gastrointestinal: Negative.    Endocrine: Negative.    Genitourinary: Negative.    Musculoskeletal: Negative.    Allergic/Immunologic: Negative.    Neurological: Negative.    Hematological: Negative.    Psychiatric/Behavioral: Negative.        Physical Exam  Constitutional:       General: He is not in acute distress.     Appearance: Normal appearance.   HENT:      Head: Normocephalic.      Mouth/Throat:      Mouth: Mucous membranes are moist.   Eyes:      Conjunctiva/sclera: Conjunctivae normal.      Pupils: Pupils are equal, round, and reactive to light.   Cardiovascular:      Rate and Rhythm: Normal rate and regular rhythm.      Pulses: Normal pulses.      Heart sounds: Normal heart sounds.   Pulmonary:      Effort: Pulmonary effort is normal.      Breath sounds: Normal breath sounds.   Abdominal:      General: Abdomen is flat. Bowel sounds are normal.      Palpations: Abdomen is soft.   Musculoskeletal:         General: No swelling. Normal range of motion.      Cervical back: Neck supple.   Skin:     General: Skin is warm and dry.      Coloration: Skin is not jaundiced.   Neurological:      General: No focal deficit present.      Mental Status: He is alert and oriented to person, place, and time. Mental status is at baseline.   Psychiatric:         Mood and Affect: Mood normal.         Behavior: Behavior normal.         Thought Content: Thought content normal.         Judgment: Judgment normal.        Result Review :   No results found for: " "\"PROBNP\", \"BNP\"  CMP          5/13/2024    10:48 6/3/2024    07:48   CMP   Glucose 123  117    BUN 27  24    Creatinine 1.32  1.06    EGFR 57.7  75.0    Sodium 141  140    Potassium 4.6  4.1    Chloride 104  107    Calcium 10.1  9.1    Total Protein 6.7  6.1    Albumin 4.5  4.0    Globulin 2.2  2.1    Total Bilirubin 1.8  1.1    Alkaline Phosphatase 62  53    AST (SGOT) 23  19    ALT (SGPT) 28  27    Albumin/Globulin Ratio 2.0  1.9    BUN/Creatinine Ratio 20.5  22.6    Anion Gap 12.0  9.6      CBC w/diff          6/3/2024    07:48 9/3/2024    14:04 11/14/2024    08:14   CBC w/Diff   WBC 13.61  12.10     13.17    RBC 4.82  4.74     4.94    Hemoglobin 14.8  14.6     14.6    Hematocrit 44.1  42.8     44.8    MCV 91.5  90.3     90.7    MCH 30.7  30.8     29.6    MCHC 33.6  34.1     32.6    RDW 12.6  12.6     12.6    Platelets 210  232     241    Neutrophil Rel % 45.0      Immature Granulocyte Rel % 0.5      Lymphocyte Rel % 44.5      Monocyte Rel % 6.5      Eosinophil Rel % 2.6      Basophil Rel % 0.9         Details          This result is from an external source.              Lipid Panel          5/13/2024    10:48 11/14/2024    08:14   Lipid Panel   Total Cholesterol 192  186    Triglycerides 96  183    HDL Cholesterol 66  55    VLDL Cholesterol 17  31    LDL Cholesterol  109  100    LDL/HDL Ratio 1.62  1.72       Lab Results   Component Value Date    TSH 2.210 05/13/2024    TSH 3.450 05/09/2023    TSH 3.080 05/05/2022      Lab Results   Component Value Date    FREET4 1.29 05/13/2024    FREET4 1.10 05/09/2023      A1C Last 3 Results          5/13/2024    10:48 11/14/2024    08:14   HGBA1C Last 3 Results   Hemoglobin A1C 6.00  6.20       PSA          11/14/2024    08:14   PSA   PSA <0.014                     Visit Diagnoses:    ICD-10-CM ICD-9-CM   1. IFG (impaired fasting glucose)  R73.01 790.21   2. Prostate cancer  C61 185   3. Lymphoma of lymph nodes in pelvis, unspecified lymphoma type  C85.96 202.86   4. " Screening for colon cancer  Z12.11 V76.51   5. Mixed hyperlipidemia  E78.2 272.2   6. Hypertension, essential  I10 401.9   7. Class 1 obesity due to excess calories without serious comorbidity with body mass index (BMI) of 34.0 to 34.9 in adult  E66.811 278.00    E66.09 V85.34    Z68.34        Assessment and Plan   Diagnoses and all orders for this visit:    1. IFG (impaired fasting glucose) (Primary)    2. Prostate cancer    3. Lymphoma of lymph nodes in pelvis, unspecified lymphoma type    4. Screening for colon cancer    5. Mixed hyperlipidemia    6. Hypertension, essential    7. Class 1 obesity due to excess calories without serious comorbidity with body mass index (BMI) of 34.0 to 34.9 in adult        Class 2 Severe Obesity (BMI >=35 and <=39.9). Obesity-related health conditions include the following: hypertension. Obesity is unchanged. BMI is is above average; BMI management plan is completed. We discussed low calorie, low carb based diet program, portion control, and increasing exercise.  Discussion about use of GLP-1 agonist with patient, risks side effects benefits briefly discussed, he is going to consider Wegovy and/or zepbound after discussion, will let me know     R foot / mtp area pain --treatment options discussed May 20, 2024 consider a Medrol Dosepak, some colchicine 0.6 twice a day and==uric acid =6.1 nov 2024 ,       shingrix completed nov 2022,  Pneumovax/ flu shot -- done sept 2022    Low-grade B-cell lymphoma, CT of the chest September 2022 shows no evidence of recurrence of lymphoma in the chest area, CT abdomen and pelvis shows no convincing evidence of recurrent lymphoma in the abdomen and pelvis, cholelithiasis, noted, discussed with patient November 16, 2022 continues follow-up     TOTAL PROSTATECTOMY JAN 2021, WITH POSITIVE LYMPH NODES FOR LOW GRADE B CELL LYMPHOMA -----patient had a follow-up TURP for scar tissue removal, is now having to intermittently straight cath to avoid recurrence  of scarring, as of May 2022 every 4 to 5 days,, continues November 2022, patient does have follow-up with Dr. Agus Christianson, current white count is normal May 2023,    ELEVATED WBC ,-- OCT 2019, --agus christianson does f/u -- white count fluctuates from 9-14,000 -- diagnosis of low-grade B-cell lymphoma,, found on prostatectomy with lymph node dissection,, diagnosed January 2021,, currently no treatment following clinically,     Right neck pain, with radiculopathy in the C6-C7 possibly C5-C6 distribution, --- MRI, neurosurgical referral, physical therapy for possible traction,. --BETTER, DID SEE DR VELIZ currently not an issue November 2022    IFG ,HGA1C = hemoglobin A1c  6.2,  urine microalbumin was normal November 13, 2023--    elevated bilirubin most likely [place]-- isolated, previously elevated at 1.3 in the past several years, normal, November 13, 2023--now up to 1.8, may 2024, will recheck, June 2024     htn --continue valsartan 320/25 mg  daily    elevated chol--continues Crestor 20 mg, and l co-Q10     psa per urology, ==6.2--BX POSITIVE CA,----UP TO 8.5 --BX --SEPT, 2020,------DR RIVERA==JAN 2021, ==========TOTAL PROSTATECTOMY-----still staight cath q 3-4 days,   Had cysto April 2023,  Getting artifical sphincter placement  July 2023,   ---psa undetectable may 2023-- undetectable again nov 2024     Colonoscopy screening, normal --Dr. Starks, April 2024        Follow Up   Return in about 6 months (around 5/19/2025).  Patient was given instructions and counseling regarding his condition or for health maintenance advice. Please see specific information pulled into the AVS if appropriate.           Answers submitted by the patient for this visit:  Other (Submitted on 11/14/2024)  Please describe your symptoms.: semi-annual check up  Have you had these symptoms before?: Yes  How long have you been having these symptoms?: Greater than 2 weeks  Please describe any probable cause for these symptoms. : age  Primary Reason  for Visit (Submitted on 11/14/2024)  What is the primary reason for your visit?: Problem Not Listed

## 2025-05-12 RX ORDER — VALSARTAN AND HYDROCHLOROTHIAZIDE 320; 12.5 MG/1; MG/1
1 TABLET, FILM COATED ORAL DAILY
Qty: 90 TABLET | Refills: 3 | Status: SHIPPED | OUTPATIENT
Start: 2025-05-12

## 2025-05-15 ENCOUNTER — LAB (OUTPATIENT)
Facility: HOSPITAL | Age: 75
End: 2025-05-15
Payer: MEDICARE

## 2025-05-15 DIAGNOSIS — I10 HYPERTENSION, ESSENTIAL: ICD-10-CM

## 2025-05-15 DIAGNOSIS — E78.2 MIXED HYPERLIPIDEMIA: ICD-10-CM

## 2025-05-15 DIAGNOSIS — E66.811 CLASS 1 OBESITY DUE TO EXCESS CALORIES WITHOUT SERIOUS COMORBIDITY WITH BODY MASS INDEX (BMI) OF 34.0 TO 34.9 IN ADULT: ICD-10-CM

## 2025-05-15 DIAGNOSIS — C61 PROSTATE CANCER: ICD-10-CM

## 2025-05-15 DIAGNOSIS — E66.09 CLASS 1 OBESITY DUE TO EXCESS CALORIES WITHOUT SERIOUS COMORBIDITY WITH BODY MASS INDEX (BMI) OF 34.0 TO 34.9 IN ADULT: ICD-10-CM

## 2025-05-15 DIAGNOSIS — C85.96 LYMPHOMA OF LYMPH NODES IN PELVIS, UNSPECIFIED LYMPHOMA TYPE: ICD-10-CM

## 2025-05-15 DIAGNOSIS — R73.01 IFG (IMPAIRED FASTING GLUCOSE): ICD-10-CM

## 2025-05-15 DIAGNOSIS — Z12.11 SCREENING FOR COLON CANCER: ICD-10-CM

## 2025-05-15 LAB
ALBUMIN SERPL-MCNC: 4.2 G/DL (ref 3.5–5.2)
ALBUMIN/GLOB SERPL: 1.8 G/DL
ALP SERPL-CCNC: 70 U/L (ref 39–117)
ALT SERPL W P-5'-P-CCNC: 36 U/L (ref 1–41)
ANION GAP SERPL CALCULATED.3IONS-SCNC: 9.1 MMOL/L (ref 5–15)
AST SERPL-CCNC: 29 U/L (ref 1–40)
BASOPHILS # BLD AUTO: 0.13 10*3/MM3 (ref 0–0.2)
BASOPHILS NFR BLD AUTO: 0.8 % (ref 0–1.5)
BILIRUB SERPL-MCNC: 1.2 MG/DL (ref 0–1.2)
BUN SERPL-MCNC: 17 MG/DL (ref 8–23)
BUN/CREAT SERPL: 17.5 (ref 7–25)
CALCIUM SPEC-SCNC: 9.7 MG/DL (ref 8.6–10.5)
CHLORIDE SERPL-SCNC: 103 MMOL/L (ref 98–107)
CHOLEST SERPL-MCNC: 213 MG/DL (ref 0–200)
CO2 SERPL-SCNC: 26.9 MMOL/L (ref 22–29)
CREAT SERPL-MCNC: 0.97 MG/DL (ref 0.76–1.27)
DEPRECATED RDW RBC AUTO: 42.4 FL (ref 37–54)
EGFRCR SERPLBLD CKD-EPI 2021: 82.9 ML/MIN/1.73
EOSINOPHIL # BLD AUTO: 0.44 10*3/MM3 (ref 0–0.4)
EOSINOPHIL NFR BLD AUTO: 2.8 % (ref 0.3–6.2)
ERYTHROCYTE [DISTWIDTH] IN BLOOD BY AUTOMATED COUNT: 12.8 % (ref 12.3–15.4)
GLOBULIN UR ELPH-MCNC: 2.3 GM/DL
GLUCOSE SERPL-MCNC: 141 MG/DL (ref 65–99)
HBA1C MFR BLD: 6.8 % (ref 4.8–5.6)
HCT VFR BLD AUTO: 44.8 % (ref 37.5–51)
HDLC SERPL-MCNC: 52 MG/DL (ref 40–60)
HGB BLD-MCNC: 15.1 G/DL (ref 13–17.7)
IMM GRANULOCYTES # BLD AUTO: 0.07 10*3/MM3 (ref 0–0.05)
IMM GRANULOCYTES NFR BLD AUTO: 0.4 % (ref 0–0.5)
LDLC SERPL CALC-MCNC: 120 MG/DL (ref 0–100)
LDLC/HDLC SERPL: 2.2 {RATIO}
LYMPHOCYTES # BLD AUTO: 6.56 10*3/MM3 (ref 0.7–3.1)
LYMPHOCYTES NFR BLD AUTO: 41.4 % (ref 19.6–45.3)
MCH RBC QN AUTO: 30.8 PG (ref 26.6–33)
MCHC RBC AUTO-ENTMCNC: 33.7 G/DL (ref 31.5–35.7)
MCV RBC AUTO: 91.4 FL (ref 79–97)
MONOCYTES # BLD AUTO: 0.88 10*3/MM3 (ref 0.1–0.9)
MONOCYTES NFR BLD AUTO: 5.6 % (ref 5–12)
NEUTROPHILS NFR BLD AUTO: 49 % (ref 42.7–76)
NEUTROPHILS NFR BLD AUTO: 7.76 10*3/MM3 (ref 1.7–7)
NRBC BLD AUTO-RTO: 0 /100 WBC (ref 0–0.2)
PLATELET # BLD AUTO: 262 10*3/MM3 (ref 140–450)
PMV BLD AUTO: 11.6 FL (ref 6–12)
POTASSIUM SERPL-SCNC: 4.7 MMOL/L (ref 3.5–5.2)
PROT SERPL-MCNC: 6.5 G/DL (ref 6–8.5)
RBC # BLD AUTO: 4.9 10*6/MM3 (ref 4.14–5.8)
SODIUM SERPL-SCNC: 139 MMOL/L (ref 136–145)
TRIGL SERPL-MCNC: 234 MG/DL (ref 0–150)
VLDLC SERPL-MCNC: 41 MG/DL (ref 5–40)
WBC NRBC COR # BLD AUTO: 15.84 10*3/MM3 (ref 3.4–10.8)

## 2025-05-15 PROCEDURE — 80053 COMPREHEN METABOLIC PANEL: CPT

## 2025-05-15 PROCEDURE — 36415 COLL VENOUS BLD VENIPUNCTURE: CPT

## 2025-05-15 PROCEDURE — 83036 HEMOGLOBIN GLYCOSYLATED A1C: CPT

## 2025-05-15 PROCEDURE — 85025 COMPLETE CBC W/AUTO DIFF WBC: CPT

## 2025-05-15 PROCEDURE — 80061 LIPID PANEL: CPT

## 2025-05-19 ENCOUNTER — OFFICE VISIT (OUTPATIENT)
Dept: INTERNAL MEDICINE | Age: 75
End: 2025-05-19
Payer: MEDICARE

## 2025-05-19 VITALS
HEIGHT: 68 IN | DIASTOLIC BLOOD PRESSURE: 70 MMHG | OXYGEN SATURATION: 95 % | SYSTOLIC BLOOD PRESSURE: 146 MMHG | WEIGHT: 254 LBS | HEART RATE: 67 BPM | TEMPERATURE: 98.2 F | BODY MASS INDEX: 38.49 KG/M2

## 2025-05-19 DIAGNOSIS — E66.811 CLASS 1 OBESITY DUE TO EXCESS CALORIES WITHOUT SERIOUS COMORBIDITY WITH BODY MASS INDEX (BMI) OF 34.0 TO 34.9 IN ADULT: ICD-10-CM

## 2025-05-19 DIAGNOSIS — Z87.891 PERSONAL HISTORY OF NICOTINE DEPENDENCE: ICD-10-CM

## 2025-05-19 DIAGNOSIS — Z00.00 MEDICARE ANNUAL WELLNESS VISIT, SUBSEQUENT: Primary | ICD-10-CM

## 2025-05-19 DIAGNOSIS — E78.2 MIXED HYPERLIPIDEMIA: ICD-10-CM

## 2025-05-19 DIAGNOSIS — E11.65 TYPE 2 DIABETES MELLITUS WITH HYPERGLYCEMIA, UNSPECIFIED WHETHER LONG TERM INSULIN USE: ICD-10-CM

## 2025-05-19 DIAGNOSIS — C85.96 LYMPHOMA OF LYMPH NODES IN PELVIS, UNSPECIFIED LYMPHOMA TYPE: ICD-10-CM

## 2025-05-19 DIAGNOSIS — I11.0 HYPERTENSIVE HEART DISEASE WITH HEART FAILURE: ICD-10-CM

## 2025-05-19 DIAGNOSIS — Z11.59 NEED FOR HEPATITIS C SCREENING TEST: ICD-10-CM

## 2025-05-19 DIAGNOSIS — Z12.11 SCREENING FOR COLON CANCER: ICD-10-CM

## 2025-05-19 DIAGNOSIS — Z71.6 TOBACCO ABUSE COUNSELING: ICD-10-CM

## 2025-05-19 DIAGNOSIS — E66.09 CLASS 1 OBESITY DUE TO EXCESS CALORIES WITHOUT SERIOUS COMORBIDITY WITH BODY MASS INDEX (BMI) OF 34.0 TO 34.9 IN ADULT: ICD-10-CM

## 2025-05-19 DIAGNOSIS — I10 HYPERTENSION, ESSENTIAL: ICD-10-CM

## 2025-05-19 DIAGNOSIS — C61 PROSTATE CANCER: ICD-10-CM

## 2025-05-19 DIAGNOSIS — E55.9 VITAMIN D DEFICIENCY: ICD-10-CM

## 2025-05-19 PROCEDURE — 1170F FXNL STATUS ASSESSED: CPT | Performed by: INTERNAL MEDICINE

## 2025-05-19 PROCEDURE — 3078F DIAST BP <80 MM HG: CPT | Performed by: INTERNAL MEDICINE

## 2025-05-19 PROCEDURE — G0439 PPPS, SUBSEQ VISIT: HCPCS | Performed by: INTERNAL MEDICINE

## 2025-05-19 PROCEDURE — 3077F SYST BP >= 140 MM HG: CPT | Performed by: INTERNAL MEDICINE

## 2025-05-19 PROCEDURE — 3044F HG A1C LEVEL LT 7.0%: CPT | Performed by: INTERNAL MEDICINE

## 2025-05-19 PROCEDURE — 99214 OFFICE O/P EST MOD 30 MIN: CPT | Performed by: INTERNAL MEDICINE

## 2025-05-19 PROCEDURE — 1126F AMNT PAIN NOTED NONE PRSNT: CPT | Performed by: INTERNAL MEDICINE

## 2025-05-19 RX ORDER — LEVOCETIRIZINE DIHYDROCHLORIDE 5 MG/1
5 TABLET, FILM COATED ORAL EVERY EVENING
Qty: 30 TABLET | Refills: 5 | Status: SHIPPED | OUTPATIENT
Start: 2025-05-19

## 2025-05-19 RX ORDER — MONTELUKAST SODIUM 10 MG/1
10 TABLET ORAL NIGHTLY
Qty: 30 TABLET | Refills: 5 | Status: SHIPPED | OUTPATIENT
Start: 2025-05-19

## 2025-05-19 NOTE — PROGRESS NOTES
Subjective   The ABCs of the Annual Wellness Visit  Medicare Wellness Visit      Uriah Perez is a 74 y.o. patient who presents for a Medicare Wellness Visit.    The following portions of the patient's history were reviewed and   updated as appropriate: allergies, current medications, past family history, past medical history, past social history, past surgical history, and problem list.    Compared to one year ago, the patient's physical   health is worse.  Compared to one year ago, the patient's mental   health is the same.    Recent Hospitalizations:  He was not admitted to the hospital during the last year.     Current Medical Providers:  Patient Care Team:  Lorenzo Brand MD as PCP - General (Internal Medicine)    Outpatient Medications Prior to Visit   Medication Sig Dispense Refill    albuterol sulfate  (90 Base) MCG/ACT inhaler Inhale 2 puffs Every 4 (Four) Hours As Needed for Wheezing or Shortness of Air. (Patient taking differently: Inhale 2 puffs Every 4 (Four) Hours As Needed for Wheezing or Shortness of Air. PRN) 8 g 0    aspirin 81 MG EC tablet Take 1 tablet by mouth Daily.      COENZYME Q10 PO Take  by mouth.      colchicine 0.6 MG tablet Take 1 tablet by mouth 2 (Two) Times a Day. 60 tablet 5    multivitamin with minerals tablet tablet Take 1 tablet by mouth Daily.      Omega-3 Fatty Acids (fish oil) 1000 MG capsule capsule Take  by mouth Daily With Breakfast.      rosuvastatin (CRESTOR) 10 MG tablet TAKE ONE TABLET BY MOUTH EVERY NIGHT AT BEDTIME 90 tablet 3    valsartan-hydrochlorothiazide (DIOVAN-HCT) 320-12.5 MG per tablet TAKE 1 TABLET BY MOUTH DAILY 90 tablet 3     No facility-administered medications prior to visit.     No opioid medication identified on active medication list. I have reviewed chart for other potential  high risk medication/s and harmful drug interactions in the elderly.      Aspirin is on active medication list. Aspirin use is indicated based on review of  "current medical condition/s. Pros and cons of this therapy have been discussed today. Benefits of this medication outweigh potential harm.  Patient has been encouraged to continue taking this medication.  .      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  ACP discussion was held with the patient during this visit. Patient does not have an advance directive, information provided.            Objective   Vitals:    05/19/25 1007   BP: 146/70   BP Location: Right arm   Patient Position: Sitting   Pulse: 67   Temp: 98.2 °F (36.8 °C)   SpO2: 95%   Weight: 115 kg (254 lb)   Height: 172.7 cm (67.99\")   PainSc: 0-No pain       Estimated body mass index is 38.63 kg/m² as calculated from the following:    Height as of this encounter: 172.7 cm (67.99\").    Weight as of this encounter: 115 kg (254 lb).                Does the patient have evidence of cognitive impairment? No  Lab Results   Component Value Date    TRIG 234 (H) 05/15/2025    HDL 52 05/15/2025     (H) 05/15/2025    VLDL 41 (H) 05/15/2025    HGBA1C 6.80 (H) 05/15/2025                                                                                               Health  Risk Assessment    Smoking Status:  Social History     Tobacco Use "   Smoking Status Light Smoker    Current packs/day: 0.00    Types: Cigarettes    Last attempt to quit: 2000    Years since quittin.3   Smokeless Tobacco Former    Quit date:    Tobacco Comments    I'll smoke 2 or 3 cigarettes per month     Alcohol Consumption:  Social History     Substance and Sexual Activity   Alcohol Use Yes    Alcohol/week: 4.0 standard drinks of alcohol    Types: 2 Cans of beer, 2 Shots of liquor per week    Comment: WEEKLY       Fall Risk Screen  ISIDRO Fall Risk Assessment was completed, and patient is at LOW risk for falls.Assessment completed on:2025    Depression Screening   Little interest or pleasure in doing things? Not at all   Feeling down, depressed, or hopeless? Not at all   PHQ-2 Total Score 0      Health Habits and Functional and Cognitive Screenin/19/2025    10:12 AM   Functional & Cognitive Status   Do you have difficulty preparing food and eating? No   Do you have difficulty bathing yourself, getting dressed or grooming yourself? No   Do you have difficulty using the toilet? No   Do you have difficulty moving around from place to place? No   Do you have trouble with steps or getting out of a bed or a chair? No   Current Diet Other   Dental Exam Up to date   Eye Exam Up to date   Exercise (times per week) 0 times per week   Current Exercises Include No Regular Exercise   Do you need help using the phone?  No   Are you deaf or do you have serious difficulty hearing?  No   Do you need help to go to places out of walking distance? No   Do you need help shopping? No   Do you need help preparing meals?  No   Do you need help with housework?  No   Do you need help with laundry? No   Do you need help taking your medications? No   Do you need help managing money? No   Do you ever drive or ride in a car without wearing a seat belt? No   Have you felt unusual stress, anger or loneliness in the last month? No   Who do you live with? Alone   If you need help, do  you have trouble finding someone available to you? No   Have you been bothered in the last four weeks by sexual problems? No   Do you have difficulty concentrating, remembering or making decisions? No           Age-appropriate Screening Schedule:  Refer to the list below for future screening recommendations based on patient's age, sex and/or medical conditions. Orders for these recommended tests are listed in the plan section. The patient has been provided with a written plan.    Health Maintenance List  Health Maintenance   Topic Date Due    DIABETIC FOOT EXAM  Never done    DIABETIC EYE EXAM  Never done    URINE MICROALBUMIN-CREATININE RATIO (uACR)  Never done    TDAP/TD VACCINES (1 - Tdap) Never done    AAA SCREEN ONCE  Never done    HEPATITIS C SCREENING  Never done    COVID-19 Vaccine (7 - Moderna risk 2024-25 season) 12/31/2025 (Originally 3/9/2025)    INFLUENZA VACCINE  07/01/2025    HEMOGLOBIN A1C  11/15/2025    LIPID PANEL  05/15/2026    ANNUAL WELLNESS VISIT  05/19/2026    COLORECTAL CANCER SCREENING  04/11/2034    Pneumococcal Vaccine 50+  Completed    ZOSTER VACCINE  Completed                                                                                                                                                CMS Preventative Services Quick Reference  Risk Factors Identified During Encounter  Tobacco Use/Dependance Risk (use dotphrase .tobaccocessation for documentation)    The above risks/problems have been discussed with the patient.  Pertinent information has been shared with the patient in the After Visit Summary.  An After Visit Summary and PPPS were made available to the patient.    Follow Up:   Next Medicare Wellness visit to be scheduled in 1 year.     Assessment & Plan  Need for hepatitis C screening test    Orders:    ORDER: Hemoglobin A1c; Future    Microalbumin / Creatinine Urine Ratio - Urine, Clean Catch; Future    Hepatitis C Antibody; Future    [place] aaa screen limited; Future     montelukast (Singulair) 10 MG tablet; Take 1 tablet by mouth Every Night.    levocetirizine (XYZAL) 5 MG tablet; Take 1 tablet by mouth Every Evening.    Comprehensive Metabolic Panel; Future    CBC & Differential; Future    proBNP; Future    Magnesium; Future    Hemoglobin A1c; Future    TSH+Free T4; Future    Microalbumin / Creatinine Urine Ratio - Urine, Clean Catch; Future    Vitamin B12 anemia; Future    Folate anemia; Future    Vitamin D,25-Hydroxy; Future    Adult Transthoracic Echo Complete W/ Cont if Necessary Per Protocol; Future    Ambulatory Referral to Cardiology    Prostate cancer    Orders:    ORDER: Hemoglobin A1c; Future    Microalbumin / Creatinine Urine Ratio - Urine, Clean Catch; Future    Hepatitis C Antibody; Future    [place] aaa screen limited; Future    montelukast (Singulair) 10 MG tablet; Take 1 tablet by mouth Every Night.    levocetirizine (XYZAL) 5 MG tablet; Take 1 tablet by mouth Every Evening.    Comprehensive Metabolic Panel; Future    CBC & Differential; Future    proBNP; Future    Magnesium; Future    Hemoglobin A1c; Future    TSH+Free T4; Future    Microalbumin / Creatinine Urine Ratio - Urine, Clean Catch; Future    Vitamin B12 anemia; Future    Folate anemia; Future    Vitamin D,25-Hydroxy; Future    Adult Transthoracic Echo Complete W/ Cont if Necessary Per Protocol; Future    Ambulatory Referral to Cardiology    Class 1 obesity due to excess calories without serious comorbidity with body mass index (BMI) of 34.0 to 34.9 in adult  Patient's (Body mass index is 38.63 kg/m².) indicates that they are obese (BMI >30) with health conditions that include hypertension and diabetes mellitus . Weight is worsening. BMI  is above average; BMI management plan is completed. We discussed portion control and increasing exercise.     Orders:    ORDER: Hemoglobin A1c; Future    Microalbumin / Creatinine Urine Ratio - Urine, Clean Catch; Future    Hepatitis C Antibody; Future    [place] aaa screen  limited; Future    montelukast (Singulair) 10 MG tablet; Take 1 tablet by mouth Every Night.    levocetirizine (XYZAL) 5 MG tablet; Take 1 tablet by mouth Every Evening.    Comprehensive Metabolic Panel; Future    CBC & Differential; Future    proBNP; Future    Magnesium; Future    Hemoglobin A1c; Future    TSH+Free T4; Future    Microalbumin / Creatinine Urine Ratio - Urine, Clean Catch; Future    Vitamin B12 anemia; Future    Folate anemia; Future    Vitamin D,25-Hydroxy; Future    Adult Transthoracic Echo Complete W/ Cont if Necessary Per Protocol; Future    Ambulatory Referral to Cardiology    Hypertension, essential  Hypertension is stable and controlled  Continue current treatment regimen.  Blood pressure will be reassessed in 6 months.    Orders:    ORDER: Hemoglobin A1c; Future    Microalbumin / Creatinine Urine Ratio - Urine, Clean Catch; Future    Hepatitis C Antibody; Future    [place] aaa screen limited; Future    montelukast (Singulair) 10 MG tablet; Take 1 tablet by mouth Every Night.    levocetirizine (XYZAL) 5 MG tablet; Take 1 tablet by mouth Every Evening.    Comprehensive Metabolic Panel; Future    CBC & Differential; Future    proBNP; Future    Magnesium; Future    Hemoglobin A1c; Future    TSH+Free T4; Future    Microalbumin / Creatinine Urine Ratio - Urine, Clean Catch; Future    Vitamin B12 anemia; Future    Folate anemia; Future    Vitamin D,25-Hydroxy; Future    Adult Transthoracic Echo Complete W/ Cont if Necessary Per Protocol; Future    Ambulatory Referral to Cardiology    Mixed hyperlipidemia   Lipid abnormalities are stable    Plan:  Continue same medication/s without change.      Discussed medication dosage, use, side effects, and goals of treatment in detail.    Counseled patient on lifestyle modifications to help control hyperlipidemia.     Patient Treatment Goals:   LDL goal is less than 70    Followup in 3 months.    Orders:    ORDER: Hemoglobin A1c; Future    Microalbumin / Creatinine  Urine Ratio - Urine, Clean Catch; Future    Hepatitis C Antibody; Future    [place] aaa screen limited; Future    montelukast (Singulair) 10 MG tablet; Take 1 tablet by mouth Every Night.    levocetirizine (XYZAL) 5 MG tablet; Take 1 tablet by mouth Every Evening.    Comprehensive Metabolic Panel; Future    CBC & Differential; Future    proBNP; Future    Magnesium; Future    Hemoglobin A1c; Future    TSH+Free T4; Future    Microalbumin / Creatinine Urine Ratio - Urine, Clean Catch; Future    Vitamin B12 anemia; Future    Folate anemia; Future    Vitamin D,25-Hydroxy; Future    Adult Transthoracic Echo Complete W/ Cont if Necessary Per Protocol; Future    Ambulatory Referral to Cardiology    Screening for colon cancer    Orders:    ORDER: Hemoglobin A1c; Future    Microalbumin / Creatinine Urine Ratio - Urine, Clean Catch; Future    Hepatitis C Antibody; Future    [place] aaa screen limited; Future    montelukast (Singulair) 10 MG tablet; Take 1 tablet by mouth Every Night.    levocetirizine (XYZAL) 5 MG tablet; Take 1 tablet by mouth Every Evening.    Comprehensive Metabolic Panel; Future    CBC & Differential; Future    proBNP; Future    Magnesium; Future    Hemoglobin A1c; Future    TSH+Free T4; Future    Microalbumin / Creatinine Urine Ratio - Urine, Clean Catch; Future    Vitamin B12 anemia; Future    Folate anemia; Future    Vitamin D,25-Hydroxy; Future    Adult Transthoracic Echo Complete W/ Cont if Necessary Per Protocol; Future    Ambulatory Referral to Cardiology    Medicare annual wellness visit, subsequent    Orders:    ORDER: Hemoglobin A1c; Future    Microalbumin / Creatinine Urine Ratio - Urine, Clean Catch; Future    Hepatitis C Antibody; Future    [place] aaa screen limited; Future    montelukast (Singulair) 10 MG tablet; Take 1 tablet by mouth Every Night.    levocetirizine (XYZAL) 5 MG tablet; Take 1 tablet by mouth Every Evening.    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    levocetirizine (XYZAL) 5 MG tablet; Take 1 tablet by mouth Every Evening.    Comprehensive Metabolic Panel; Future    CBC & Differential; Future    proBNP; Future    Magnesium; Future    Hemoglobin A1c; Future    TSH+Free T4; Future    Microalbumin / Creatinine Urine Ratio - Urine, Clean Catch; Future    Vitamin B12 anemia; Future    Folate anemia; Future    Vitamin D,25-Hydroxy; Future    Adult Transthoracic Echo Complete W/ Cont if Necessary Per Protocol; Future    Ambulatory Referral to Cardiology    Type 2 diabetes mellitus with hyperglycemia, unspecified whether long term insulin use  Diabetes is worsening.   Medication changes per orders.  Reminded to bring in blood sugar diary at next visit.  Recommended a Mediterranean style of eating  Diabetes will be reassessed in 3 months    Orders:    ORDER: Hemoglobin A1c; Future    Microalbumin / Creatinine Urine Ratio - Urine, Clean Catch; Future    Hepatitis C Antibody; Future    [place] aaa screen limited; Future    montelukast (Singulair) 10 MG tablet; Take 1 tablet by mouth Every Night.    levocetirizine (XYZAL) 5 MG tablet; Take 1 tablet by mouth Every Evening.    Comprehensive Metabolic Panel; Future    CBC & Differential; Future    proBNP; Future    Magnesium; Future    Hemoglobin A1c; Future    TSH+Free  T4; Future    Microalbumin / Creatinine Urine Ratio - Urine, Clean Catch; Future    Vitamin B12 anemia; Future    Folate anemia; Future    Vitamin D,25-Hydroxy; Future    Adult Transthoracic Echo Complete W/ Cont if Necessary Per Protocol; Future    Ambulatory Referral to Cardiology    Vitamin D deficiency    Orders:    ORDER: Hemoglobin A1c; Future    Microalbumin / Creatinine Urine Ratio - Urine, Clean Catch; Future    Hepatitis C Antibody; Future    [place] aaa screen limited; Future    montelukast (Singulair) 10 MG tablet; Take 1 tablet by mouth Every Night.    levocetirizine (XYZAL) 5 MG tablet; Take 1 tablet by mouth Every Evening.    Comprehensive Metabolic Panel; Future    CBC & Differential; Future    proBNP; Future    Magnesium; Future    Hemoglobin A1c; Future    TSH+Free T4; Future    Microalbumin / Creatinine Urine Ratio - Urine, Clean Catch; Future    Vitamin B12 anemia; Future    Folate anemia; Future    Vitamin D,25-Hydroxy; Future    Adult Transthoracic Echo Complete W/ Cont if Necessary Per Protocol; Future    Ambulatory Referral to Cardiology    metFORMIN (GLUCOPHAGE) 500 MG tablet; Take 1 tablet by mouth 2 (Two) Times a Day With Meals.    Hypertensive heart disease with heart failure    Orders:    proBNP; Future         Follow Up:   Return in about 3 months (around 8/19/2025).        Answers submitted by the patient for this visit:  High Blood Pressure Questionnaire (Submitted on 5/19/2025)  Chief Complaint: Hypertension  Chronicity: chronic  Onset: more than 1 year ago  Progression since onset: unchanged  Condition status: controlled  anxiety: No  blurred vision: No  chest pain: No  headaches: No  malaise/fatigue: No  orthopnea: No  palpitations: No  peripheral edema: No  shortness of breath: No  Agents associated with hypertension: decongestants  CAD risks: dyslipidemia, family history, obesity  Compliance problems: diet, exercise

## 2025-05-19 NOTE — ASSESSMENT & PLAN NOTE
Hypertension is stable and controlled  Continue current treatment regimen.  Blood pressure will be reassessed in 6 months.    Orders:    ORDER: Hemoglobin A1c; Future    Microalbumin / Creatinine Urine Ratio - Urine, Clean Catch; Future    Hepatitis C Antibody; Future    [place] aaa screen limited; Future    montelukast (Singulair) 10 MG tablet; Take 1 tablet by mouth Every Night.    levocetirizine (XYZAL) 5 MG tablet; Take 1 tablet by mouth Every Evening.    Comprehensive Metabolic Panel; Future    CBC & Differential; Future    proBNP; Future    Magnesium; Future    Hemoglobin A1c; Future    TSH+Free T4; Future    Microalbumin / Creatinine Urine Ratio - Urine, Clean Catch; Future    Vitamin B12 anemia; Future    Folate anemia; Future    Vitamin D,25-Hydroxy; Future    Adult Transthoracic Echo Complete W/ Cont if Necessary Per Protocol; Future    Ambulatory Referral to Cardiology

## 2025-05-19 NOTE — ASSESSMENT & PLAN NOTE
Patient's (Body mass index is 38.63 kg/m².) indicates that they are obese (BMI >30) with health conditions that include hypertension and diabetes mellitus . Weight is worsening. BMI  is above average; BMI management plan is completed. We discussed portion control and increasing exercise.     Orders:    ORDER: Hemoglobin A1c; Future    Microalbumin / Creatinine Urine Ratio - Urine, Clean Catch; Future    Hepatitis C Antibody; Future    [place] aaa screen limited; Future    montelukast (Singulair) 10 MG tablet; Take 1 tablet by mouth Every Night.    levocetirizine (XYZAL) 5 MG tablet; Take 1 tablet by mouth Every Evening.    Comprehensive Metabolic Panel; Future    CBC & Differential; Future    proBNP; Future    Magnesium; Future    Hemoglobin A1c; Future    TSH+Free T4; Future    Microalbumin / Creatinine Urine Ratio - Urine, Clean Catch; Future    Vitamin B12 anemia; Future    Folate anemia; Future    Vitamin D,25-Hydroxy; Future    Adult Transthoracic Echo Complete W/ Cont if Necessary Per Protocol; Future    Ambulatory Referral to Cardiology

## 2025-05-19 NOTE — ASSESSMENT & PLAN NOTE
Lipid abnormalities are stable    Plan:  Continue same medication/s without change.      Discussed medication dosage, use, side effects, and goals of treatment in detail.    Counseled patient on lifestyle modifications to help control hyperlipidemia.     Patient Treatment Goals:   LDL goal is less than 70    Followup in 3 months.    Orders:    ORDER: Hemoglobin A1c; Future    Microalbumin / Creatinine Urine Ratio - Urine, Clean Catch; Future    Hepatitis C Antibody; Future    [place] aaa screen limited; Future    montelukast (Singulair) 10 MG tablet; Take 1 tablet by mouth Every Night.    levocetirizine (XYZAL) 5 MG tablet; Take 1 tablet by mouth Every Evening.    Comprehensive Metabolic Panel; Future    CBC & Differential; Future    proBNP; Future    Magnesium; Future    Hemoglobin A1c; Future    TSH+Free T4; Future    Microalbumin / Creatinine Urine Ratio - Urine, Clean Catch; Future    Vitamin B12 anemia; Future    Folate anemia; Future    Vitamin D,25-Hydroxy; Future    Adult Transthoracic Echo Complete W/ Cont if Necessary Per Protocol; Future    Ambulatory Referral to Cardiology

## 2025-05-19 NOTE — PROGRESS NOTES
"Chief Complaint   Patient presents with    Medicare Wellness-subsequent     Wellness visit, lab follow up. Pt is wanting to discuss weight gain, SOA , cough that comes and goes.         Objective   Vital Signs  Vitals:    05/19/25 1007   BP: 146/70   BP Location: Right arm   Patient Position: Sitting   Pulse: 67   Temp: 98.2 °F (36.8 °C)   SpO2: 95%   Weight: 115 kg (254 lb)   Height: 172.7 cm (67.99\")      Body mass index is 38.63 kg/m².  Review of Systems   Eyes:  Negative for blurred vision.   Respiratory:  Negative for shortness of breath.    Cardiovascular:  Negative for chest pain and palpitations.      Physical Exam  Constitutional:       General: He is not in acute distress.     Appearance: Normal appearance. He is obese.   HENT:      Head: Normocephalic.      Mouth/Throat:      Mouth: Mucous membranes are moist.   Eyes:      Conjunctiva/sclera: Conjunctivae normal.      Pupils: Pupils are equal, round, and reactive to light.   Cardiovascular:      Rate and Rhythm: Normal rate and regular rhythm.      Pulses: Normal pulses.      Heart sounds: Normal heart sounds.   Pulmonary:      Effort: Pulmonary effort is normal.      Breath sounds: Normal breath sounds.   Abdominal:      General: Bowel sounds are normal.      Palpations: Abdomen is soft.   Musculoskeletal:         General: No swelling. Normal range of motion.      Cervical back: Neck supple.   Skin:     General: Skin is warm and dry.      Coloration: Skin is not jaundiced.   Neurological:      General: No focal deficit present.      Mental Status: He is alert and oriented to person, place, and time. Mental status is at baseline.   Psychiatric:         Mood and Affect: Mood normal.         Behavior: Behavior normal.         Thought Content: Thought content normal.         Judgment: Judgment normal.        Result Review :   No results found for: \"PROBNP\", \"BNP\"  CMP          6/3/2024    07:48 5/15/2025    08:52   CMP   Glucose 117  141    BUN 24  17  "   Creatinine 1.06  0.97    EGFR 75.0  82.9    Sodium 140  139    Potassium 4.1  4.7    Chloride 107  103    Calcium 9.1  9.7    Total Protein 6.1  6.5    Albumin 4.0  4.2    Globulin 2.1  2.3    Total Bilirubin 1.1  1.2    Alkaline Phosphatase 53  70    AST (SGOT) 19  29    ALT (SGPT) 27  36    Albumin/Globulin Ratio 1.9  1.8    BUN/Creatinine Ratio 22.6  17.5    Anion Gap 9.6  9.1      CBC w/diff          9/3/2024    14:04 11/14/2024    08:14 5/15/2025    08:52   CBC w/Diff   WBC 12.10     13.17  15.84    RBC 4.74     4.94  4.90    Hemoglobin 14.6     14.6  15.1    Hematocrit 42.8     44.8  44.8    MCV 90.3     90.7  91.4    MCH 30.8     29.6  30.8    MCHC 34.1     32.6  33.7    RDW 12.6     12.6  12.8    Platelets 232     241  262    Neutrophil Rel %   49.0    Immature Granulocyte Rel %   0.4    Lymphocyte Rel %   41.4    Monocyte Rel %   5.6    Eosinophil Rel %   2.8    Basophil Rel %   0.8       Details          This result is from an external source.              Lipid Panel          11/14/2024    08:14 5/15/2025    08:52   Lipid Panel   Total Cholesterol 186  213    Triglycerides 183  234    HDL Cholesterol 55  52    VLDL Cholesterol 31  41    LDL Cholesterol  100  120    LDL/HDL Ratio 1.72  2.20       Lab Results   Component Value Date    TSH 2.210 05/13/2024    TSH 3.450 05/09/2023    TSH 3.080 05/05/2022      Lab Results   Component Value Date    FREET4 1.29 05/13/2024    FREET4 1.10 05/09/2023      A1C Last 3 Results          11/14/2024    08:14 5/15/2025    08:52   HGBA1C Last 3 Results   Hemoglobin A1C 6.20  6.80       PSA          11/14/2024    08:14   PSA   PSA <0.014                     Visit Diagnoses:    ICD-10-CM ICD-9-CM   1. Medicare annual wellness visit, subsequent  Z00.00 V70.0   2. Need for hepatitis C screening test  Z11.59 V73.89   3. Prostate cancer  C61 185   4. Class 1 obesity due to excess calories without serious comorbidity with body mass index (BMI) of 34.0 to 34.9 in adult  E66.811  278.00    E66.09 V85.34    Z68.34    5. Hypertension, essential  I10 401.9   6. Mixed hyperlipidemia  E78.2 272.2   7. Screening for colon cancer  Z12.11 V76.51   8. 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 Type 2 diabetes mellitus with hyperglycemia, unspecified whether long term insulin use  E11.65 250.00   10. Vitamin D deficiency  E55.9 268.9   11. Hypertensive heart disease with heart failure  I11.0 402.91     428.9   12. Tobacco abuse counseling  Z71.6 V65.42     305.1   13. Personal history of nicotine dependence  Z87.891 V15.82       Assessment and Plan   Diagnoses and all orders for this visit:    1. Medicare annual wellness visit, subsequent (Primary)  Assessment & Plan:    Orders:    ORDER: Hemoglobin A1c; Future    Microalbumin / Creatinine Urine Ratio - Urine, Clean Catch; Future    Hepatitis C Antibody; Future    [place] aaa screen limited; Future    montelukast (Singulair) 10 MG tablet; Take 1 tablet by mouth Every Night.    levocetirizine (XYZAL) 5 MG tablet; Take 1 tablet by mouth Every Evening.    Comprehensive Metabolic Panel; Future    CBC & Differential; Future    proBNP; Future    Magnesium; Future    Hemoglobin A1c; Future    TSH+Free T4; Future    Microalbumin / Creatinine Urine Ratio - Urine, Clean Catch; Future    Vitamin B12 anemia; Future    Folate anemia; Future    Vitamin D,25-Hydroxy; Future    Adult Transthoracic Echo Complete W/ Cont if Necessary Per Protocol; Future    Ambulatory Referral to Cardiology      Orders:  -     ORDER: Hemoglobin A1c; Future  -     Microalbumin / Creatinine Urine Ratio - Urine, Clean Catch; Future  -     Hepatitis C Antibody; Future  -     [place] aaa screen limited; Future  -     montelukast (Singulair) 10 MG tablet; Take 1 tablet by mouth Every Night.  Dispense: 30 tablet; Refill: 5  -     levocetirizine (XYZAL) 5 MG tablet; Take 1 tablet by mouth Every Evening.  Dispense: 30 tablet; Refill: 5  -     Comprehensive Metabolic  Panel; Future  -     CBC & Differential; Future  -     proBNP; Future  -     Magnesium; Future  -     Hemoglobin A1c; Future  -     TSH+Free T4; Future  -     Microalbumin / Creatinine Urine Ratio - Urine, Clean Catch; Future  -     Vitamin B12 anemia; Future  -     Folate anemia; Future  -     Vitamin D,25-Hydroxy; Future  -     Adult Transthoracic Echo Complete W/ Cont if Necessary Per Protocol; Future  -     Ambulatory Referral to Cardiology    2. Need for hepatitis C screening test  Assessment & Plan:    Orders:    ORDER: Hemoglobin A1c; Future    Microalbumin / Creatinine Urine Ratio - Urine, Clean Catch; Future    Hepatitis C Antibody; Future    [place] aaa screen limited; Future    montelukast (Singulair) 10 MG tablet; Take 1 tablet by mouth Every Night.    levocetirizine (XYZAL) 5 MG tablet; Take 1 tablet by mouth Every Evening.    Comprehensive Metabolic Panel; Future    CBC & Differential; Future    proBNP; Future    Magnesium; Future    Hemoglobin A1c; Future    TSH+Free T4; Future    Microalbumin / Creatinine Urine Ratio - Urine, Clean Catch; Future    Vitamin B12 anemia; Future    Folate anemia; Future    Vitamin D,25-Hydroxy; Future    Adult Transthoracic Echo Complete W/ Cont if Necessary Per Protocol; Future    Ambulatory Referral to Cardiology      Orders:  -     ORDER: Hemoglobin A1c; Future  -     Microalbumin / Creatinine Urine Ratio - Urine, Clean Catch; Future  -     Hepatitis C Antibody; Future  -     [place] aaa screen limited; Future  -     montelukast (Singulair) 10 MG tablet; Take 1 tablet by mouth Every Night.  Dispense: 30 tablet; Refill: 5  -     levocetirizine (XYZAL) 5 MG tablet; Take 1 tablet by mouth Every Evening.  Dispense: 30 tablet; Refill: 5  -     Comprehensive Metabolic Panel; Future  -     CBC & Differential; Future  -     proBNP; Future  -     Magnesium; Future  -     Hemoglobin A1c; Future  -     TSH+Free T4; Future  -     Microalbumin / Creatinine Urine Ratio - Urine, Clean Catch;  Future  -     Vitamin B12 anemia; Future  -     Folate anemia; Future  -     Vitamin D,25-Hydroxy; Future  -     Adult Transthoracic Echo Complete W/ Cont if Necessary Per Protocol; Future  -     Ambulatory Referral to Cardiology    3. Prostate cancer  Assessment & Plan:    Orders:    ORDER: Hemoglobin A1c; Future    Microalbumin / Creatinine Urine Ratio - Urine, Clean Catch; Future    Hepatitis C Antibody; Future    [place] aaa screen limited; Future    montelukast (Singulair) 10 MG tablet; Take 1 tablet by mouth Every Night.    levocetirizine (XYZAL) 5 MG tablet; Take 1 tablet by mouth Every Evening.    Comprehensive Metabolic Panel; Future    CBC & Differential; Future    proBNP; Future    Magnesium; Future    Hemoglobin A1c; Future    TSH+Free T4; Future    Microalbumin / Creatinine Urine Ratio - Urine, Clean Catch; Future    Vitamin B12 anemia; Future    Folate anemia; Future    Vitamin D,25-Hydroxy; Future    Adult Transthoracic Echo Complete W/ Cont if Necessary Per Protocol; Future    Ambulatory Referral to Cardiology      Orders:  -     ORDER: Hemoglobin A1c; Future  -     Microalbumin / Creatinine Urine Ratio - Urine, Clean Catch; Future  -     Hepatitis C Antibody; Future  -     [place] aaa screen limited; Future  -     montelukast (Singulair) 10 MG tablet; Take 1 tablet by mouth Every Night.  Dispense: 30 tablet; Refill: 5  -     levocetirizine (XYZAL) 5 MG tablet; Take 1 tablet by mouth Every Evening.  Dispense: 30 tablet; Refill: 5  -     Comprehensive Metabolic Panel; Future  -     CBC & Differential; Future  -     proBNP; Future  -     Magnesium; Future  -     Hemoglobin A1c; Future  -     TSH+Free T4; Future  -     Microalbumin / Creatinine Urine Ratio - Urine, Clean Catch; Future  -     Vitamin B12 anemia; Future  -     Folate anemia; Future  -     Vitamin D,25-Hydroxy; Future  -     Adult Transthoracic Echo Complete W/ Cont if Necessary Per Protocol; Future  -     Ambulatory Referral to Cardiology    4.  Class 1 obesity due to excess calories without serious comorbidity with body mass index (BMI) of 34.0 to 34.9 in adult  Assessment & Plan:  Patient's (Body mass index is 38.63 kg/m².) indicates that they are obese (BMI >30) with health conditions that include hypertension and diabetes mellitus . Weight is worsening. BMI  is above average; BMI management plan is completed. We discussed portion control and increasing exercise.     Orders:    ORDER: Hemoglobin A1c; Future    Microalbumin / Creatinine Urine Ratio - Urine, Clean Catch; Future    Hepatitis C Antibody; Future    [place] aaa screen limited; Future    montelukast (Singulair) 10 MG tablet; Take 1 tablet by mouth Every Night.    levocetirizine (XYZAL) 5 MG tablet; Take 1 tablet by mouth Every Evening.    Comprehensive Metabolic Panel; Future    CBC & Differential; Future    proBNP; Future    Magnesium; Future    Hemoglobin A1c; Future    TSH+Free T4; Future    Microalbumin / Creatinine Urine Ratio - Urine, Clean Catch; Future    Vitamin B12 anemia; Future    Folate anemia; Future    Vitamin D,25-Hydroxy; Future    Adult Transthoracic Echo Complete W/ Cont if Necessary Per Protocol; Future    Ambulatory Referral to Cardiology      Orders:  -     ORDER: Hemoglobin A1c; Future  -     Microalbumin / Creatinine Urine Ratio - Urine, Clean Catch; Future  -     Hepatitis C Antibody; Future  -     [place] aaa screen limited; Future  -     montelukast (Singulair) 10 MG tablet; Take 1 tablet by mouth Every Night.  Dispense: 30 tablet; Refill: 5  -     levocetirizine (XYZAL) 5 MG tablet; Take 1 tablet by mouth Every Evening.  Dispense: 30 tablet; Refill: 5  -     Comprehensive Metabolic Panel; Future  -     CBC & Differential; Future  -     proBNP; Future  -     Magnesium; Future  -     Hemoglobin A1c; Future  -     TSH+Free T4; Future  -     Microalbumin / Creatinine Urine Ratio - Urine, Clean Catch; Future  -     Vitamin B12 anemia; Future  -     Folate anemia; Future  -      Vitamin D,25-Hydroxy; Future  -     Adult Transthoracic Echo Complete W/ Cont if Necessary Per Protocol; Future  -     Ambulatory Referral to Cardiology    5. Hypertension, essential  Assessment & Plan:  Hypertension is stable and controlled  Continue current treatment regimen.  Blood pressure will be reassessed in 6 months.    Orders:    ORDER: Hemoglobin A1c; Future    Microalbumin / Creatinine Urine Ratio - Urine, Clean Catch; Future    Hepatitis C Antibody; Future    [place] aaa screen limited; Future    montelukast (Singulair) 10 MG tablet; Take 1 tablet by mouth Every Night.    levocetirizine (XYZAL) 5 MG tablet; Take 1 tablet by mouth Every Evening.    Comprehensive Metabolic Panel; Future    CBC & Differential; Future    proBNP; Future    Magnesium; Future    Hemoglobin A1c; Future    TSH+Free T4; Future    Microalbumin / Creatinine Urine Ratio - Urine, Clean Catch; Future    Vitamin B12 anemia; Future    Folate anemia; Future    Vitamin D,25-Hydroxy; Future    Adult Transthoracic Echo Complete W/ Cont if Necessary Per Protocol; Future    Ambulatory Referral to Cardiology      Orders:  -     ORDER: Hemoglobin A1c; Future  -     Microalbumin / Creatinine Urine Ratio - Urine, Clean Catch; Future  -     Hepatitis C Antibody; Future  -     [place] aaa screen limited; Future  -     montelukast (Singulair) 10 MG tablet; Take 1 tablet by mouth Every Night.  Dispense: 30 tablet; Refill: 5  -     levocetirizine (XYZAL) 5 MG tablet; Take 1 tablet by mouth Every Evening.  Dispense: 30 tablet; Refill: 5  -     Comprehensive Metabolic Panel; Future  -     CBC & Differential; Future  -     proBNP; Future  -     Magnesium; Future  -     Hemoglobin A1c; Future  -     TSH+Free T4; Future  -     Microalbumin / Creatinine Urine Ratio - Urine, Clean Catch; Future  -     Vitamin B12 anemia; Future  -     Folate anemia; Future  -     Vitamin D,25-Hydroxy; Future  -     Adult Transthoracic Echo Complete W/ Cont if Necessary Per Protocol;  Future  -     Ambulatory Referral to Cardiology    6. Mixed hyperlipidemia  Assessment & Plan:   Lipid abnormalities are stable    Plan:  Continue same medication/s without change.      Discussed medication dosage, use, side effects, and goals of treatment in detail.    Counseled patient on lifestyle modifications to help control hyperlipidemia.     Patient Treatment Goals:   LDL goal is less than 70    Followup in 3 months.    Orders:    ORDER: Hemoglobin A1c; Future    Microalbumin / Creatinine Urine Ratio - Urine, Clean Catch; Future    Hepatitis C Antibody; Future    [place] aaa screen limited; Future    montelukast (Singulair) 10 MG tablet; Take 1 tablet by mouth Every Night.    levocetirizine (XYZAL) 5 MG tablet; Take 1 tablet by mouth Every Evening.    Comprehensive Metabolic Panel; Future    CBC & Differential; Future    proBNP; Future    Magnesium; Future    Hemoglobin A1c; Future    TSH+Free T4; Future    Microalbumin / Creatinine Urine Ratio - Urine, Clean Catch; Future    Vitamin B12 anemia; Future    Folate anemia; Future    Vitamin D,25-Hydroxy; Future    Adult Transthoracic Echo Complete W/ Cont if Necessary Per Protocol; Future    Ambulatory Referral to Cardiology      Orders:  -     ORDER: Hemoglobin A1c; Future  -     Microalbumin / Creatinine Urine Ratio - Urine, Clean Catch; Future  -     Hepatitis C Antibody; Future  -     [place] aaa screen limited; Future  -     montelukast (Singulair) 10 MG tablet; Take 1 tablet by mouth Every Night.  Dispense: 30 tablet; Refill: 5  -     levocetirizine (XYZAL) 5 MG tablet; Take 1 tablet by mouth Every Evening.  Dispense: 30 tablet; Refill: 5  -     Comprehensive Metabolic Panel; Future  -     CBC & Differential; Future  -     proBNP; Future  -     Magnesium; Future  -     Hemoglobin A1c; Future  -     TSH+Free T4; Future  -     Microalbumin / Creatinine Urine Ratio - Urine, Clean Catch; Future  -     Vitamin B12 anemia; Future  -     Folate anemia; Future  -      Vitamin D,25-Hydroxy; Future  -     Adult Transthoracic Echo Complete W/ Cont if Necessary Per Protocol; Future  -     Ambulatory Referral to Cardiology    7. Screening for colon cancer  Assessment & Plan:    Orders:    ORDER: Hemoglobin A1c; Future    Microalbumin / Creatinine Urine Ratio - Urine, Clean Catch; Future    Hepatitis C Antibody; Future    [place] aaa screen limited; Future    montelukast (Singulair) 10 MG tablet; Take 1 tablet by mouth Every Night.    levocetirizine (XYZAL) 5 MG tablet; Take 1 tablet by mouth Every Evening.    Comprehensive Metabolic Panel; Future    CBC & Differential; Future    proBNP; Future    Magnesium; Future    Hemoglobin A1c; Future    TSH+Free T4; Future    Microalbumin / Creatinine Urine Ratio - Urine, Clean Catch; Future    Vitamin B12 anemia; Future    Folate anemia; Future    Vitamin D,25-Hydroxy; Future    Adult Transthoracic Echo Complete W/ Cont if Necessary Per Protocol; Future    Ambulatory Referral to Cardiology      Orders:  -     ORDER: Hemoglobin A1c; Future  -     Microalbumin / Creatinine Urine Ratio - Urine, Clean Catch; Future  -     Hepatitis C Antibody; Future  -     [place] aaa screen limited; Future  -     montelukast (Singulair) 10 MG tablet; Take 1 tablet by mouth Every Night.  Dispense: 30 tablet; Refill: 5  -     levocetirizine (XYZAL) 5 MG tablet; Take 1 tablet by mouth Every Evening.  Dispense: 30 tablet; Refill: 5  -     Comprehensive Metabolic Panel; Future  -     CBC & Differential; Future  -     proBNP; Future  -     Magnesium; Future  -     Hemoglobin A1c; Future  -     TSH+Free T4; Future  -     Microalbumin / Creatinine Urine Ratio - Urine, Clean Catch; Future  -     Vitamin B12 anemia; Future  -     Folate anemia; Future  -     Vitamin D,25-Hydroxy; Future  -     Adult Transthoracic Echo Complete W/ Cont if Necessary Per Protocol; Future  -     Ambulatory Referral to Cardiology    8. Lymphoma of lymph nodes in pelvis, unspecified lymphoma  type  Assessment & Plan:    Orders:    ORDER: Hemoglobin A1c; Future    Microalbumin / Creatinine Urine Ratio - Urine, Clean Catch; Future    Hepatitis C Antibody; Future    [place] aaa screen limited; Future    montelukast (Singulair) 10 MG tablet; Take 1 tablet by mouth Every Night.    levocetirizine (XYZAL) 5 MG tablet; Take 1 tablet by mouth Every Evening.    Comprehensive Metabolic Panel; Future    CBC & Differential; Future    proBNP; Future    Magnesium; Future    Hemoglobin A1c; Future    TSH+Free T4; Future    Microalbumin / Creatinine Urine Ratio - Urine, Clean Catch; Future    Vitamin B12 anemia; Future    Folate anemia; Future    Vitamin D,25-Hydroxy; Future    Adult Transthoracic Echo Complete W/ Cont if Necessary Per Protocol; Future    Ambulatory Referral to Cardiology      Orders:  -     ORDER: Hemoglobin A1c; Future  -     Microalbumin / Creatinine Urine Ratio - Urine, Clean Catch; Future  -     Hepatitis C Antibody; Future  -     [place] aaa screen limited; Future  -     montelukast (Singulair) 10 MG tablet; Take 1 tablet by mouth Every Night.  Dispense: 30 tablet; Refill: 5  -     levocetirizine (XYZAL) 5 MG tablet; Take 1 tablet by mouth Every Evening.  Dispense: 30 tablet; Refill: 5  -     Comprehensive Metabolic Panel; Future  -     CBC & Differential; Future  -     proBNP; Future  -     Magnesium; Future  -     Hemoglobin A1c; Future  -     TSH+Free T4; Future  -     Microalbumin / Creatinine Urine Ratio - Urine, Clean Catch; Future  -     Vitamin B12 anemia; Future  -     Folate anemia; Future  -     Vitamin D,25-Hydroxy; Future  -     Adult Transthoracic Echo Complete W/ Cont if Necessary Per Protocol; Future  -     Ambulatory Referral to Cardiology    9. Type 2 diabetes mellitus with hyperglycemia, unspecified whether long term insulin use  Assessment & Plan:  Diabetes is worsening.   Medication changes per orders.  Reminded to bring in blood sugar diary at next visit.  Recommended a Mediterranean  style of eating  Diabetes will be reassessed in 3 months    Orders:    ORDER: Hemoglobin A1c; Future    Microalbumin / Creatinine Urine Ratio - Urine, Clean Catch; Future    Hepatitis C Antibody; Future    [place] aaa screen limited; Future    montelukast (Singulair) 10 MG tablet; Take 1 tablet by mouth Every Night.    levocetirizine (XYZAL) 5 MG tablet; Take 1 tablet by mouth Every Evening.    Comprehensive Metabolic Panel; Future    CBC & Differential; Future    proBNP; Future    Magnesium; Future    Hemoglobin A1c; Future    TSH+Free T4; Future    Microalbumin / Creatinine Urine Ratio - Urine, Clean Catch; Future    Vitamin B12 anemia; Future    Folate anemia; Future    Vitamin D,25-Hydroxy; Future    Adult Transthoracic Echo Complete W/ Cont if Necessary Per Protocol; Future    Ambulatory Referral to Cardiology      Orders:  -     ORDER: Hemoglobin A1c; Future  -     Microalbumin / Creatinine Urine Ratio - Urine, Clean Catch; Future  -     Hepatitis C Antibody; Future  -     US aaa screen limited; Future  -     montelukast (Singulair) 10 MG tablet; Take 1 tablet by mouth Every Night.  Dispense: 30 tablet; Refill: 5  -     levocetirizine (XYZAL) 5 MG tablet; Take 1 tablet by mouth Every Evening.  Dispense: 30 tablet; Refill: 5  -     Comprehensive Metabolic Panel; Future  -     CBC & Differential; Future  -     proBNP; Future  -     Magnesium; Future  -     Hemoglobin A1c; Future  -     TSH+Free T4; Future  -     Microalbumin / Creatinine Urine Ratio - Urine, Clean Catch; Future  -     Vitamin B12 anemia; Future  -     Folate anemia; Future  -     Vitamin D,25-Hydroxy; Future  -     Adult Transthoracic Echo Complete W/ Cont if Necessary Per Protocol; Future  -     Ambulatory Referral to Cardiology    10. Vitamin D deficiency  Assessment & Plan:    Orders:    ORDER: Hemoglobin A1c; Future    Microalbumin / Creatinine Urine Ratio - Urine, Clean Catch; Future    Hepatitis C Antibody; Future    US aaa screen limited;  Future    montelukast (Singulair) 10 MG tablet; Take 1 tablet by mouth Every Night.    levocetirizine (XYZAL) 5 MG tablet; Take 1 tablet by mouth Every Evening.    Comprehensive Metabolic Panel; Future    CBC & Differential; Future    proBNP; Future    Magnesium; Future    Hemoglobin A1c; Future    TSH+Free T4; Future    Microalbumin / Creatinine Urine Ratio - Urine, Clean Catch; Future    Vitamin B12 anemia; Future    Folate anemia; Future    Vitamin D,25-Hydroxy; Future    Adult Transthoracic Echo Complete W/ Cont if Necessary Per Protocol; Future    Ambulatory Referral to Cardiology    metFORMIN (GLUCOPHAGE) 500 MG tablet; Take 1 tablet by mouth 2 (Two) Times a Day With Meals.      Orders:  -     ORDER: Hemoglobin A1c; Future  -     Microalbumin / Creatinine Urine Ratio - Urine, Clean Catch; Future  -     Hepatitis C Antibody; Future  -     US aaa screen limited; Future  -     montelukast (Singulair) 10 MG tablet; Take 1 tablet by mouth Every Night.  Dispense: 30 tablet; Refill: 5  -     levocetirizine (XYZAL) 5 MG tablet; Take 1 tablet by mouth Every Evening.  Dispense: 30 tablet; Refill: 5  -     Comprehensive Metabolic Panel; Future  -     CBC & Differential; Future  -     proBNP; Future  -     Magnesium; Future  -     Hemoglobin A1c; Future  -     TSH+Free T4; Future  -     Microalbumin / Creatinine Urine Ratio - Urine, Clean Catch; Future  -     Vitamin B12 anemia; Future  -     Folate anemia; Future  -     Vitamin D,25-Hydroxy; Future  -     Adult Transthoracic Echo Complete W/ Cont if Necessary Per Protocol; Future  -     Ambulatory Referral to Cardiology  -     metFORMIN (GLUCOPHAGE) 500 MG tablet; Take 1 tablet by mouth 2 (Two) Times a Day With Meals.  Dispense: 60 tablet; Refill: 5    11. Hypertensive heart disease with heart failure  Assessment & Plan:    Orders:    proBNP; Future    Orders:  -     proBNP; Future    12. Tobacco abuse counseling  -      CT Chest Low Dose Cancer Screening WO;  Future    13. Personal history of nicotine dependence  -      CT Chest Low Dose Cancer Screening WO; Future    Medicare wellness completed May 19, 2025,    Soa, exertional dyspnea, ===? Chest pain, will refer to cardio,--- or stress testing ,echo.  Echo, repeat labs, will be set up    Allergies,     Class 2 Severe Obesity (BMI >=35 and <=39.9). Obesity-related health conditions include the following: hypertension. Obesity is unchanged. BMI is is above average; BMI management plan is completed. We discussed low calorie, low carb based diet program, portion control, and increasing exercise.  Discussion about use of GLP-1 agonist with patient, risks side effects benefits briefly discussed, he is going to consider Wegovy and/or zepbound after discussion, will let me know     shingrix completed nov 2022,  Pneumovax/ flu shot -- done sept 2022    Low-grade B-cell lymphoma, CT of the chest September 2022 shows no evidence of recurrence of lymphoma in the chest area, CT abdomen and pelvis shows no convincing evidence of recurrent lymphoma in the abdomen and pelvis, cholelithiasis, noted, discussed with patient November 16, 2022 continues follow-up     TOTAL PROSTATECTOMY JAN 2021, WITH POSITIVE LYMPH NODES FOR LOW GRADE B CELL LYMPHOMA -----patient had a follow-up TURP for scar tissue removal, is now having to intermittently straight cath to avoid recurrence of scarring, as of May 2022 every 4 to 5 days,, continues November 2022, patient does have follow-up with Dr. Agus Christianson, current white count is normal May 2023,    ELEVATED WBC ,-- OCT 2019, --agus christianson does f/u -- white count fluctuates from 9-14,000 -- diagnosis of low-grade B-cell lymphoma,, found on prostatectomy with lymph node dissection,, diagnosed January 2021,, currently no treatment following clinically,     Right neck pain, with radiculopathy in the C6-C7 possibly C5-C6 distribution, --- MRI, neurosurgical referral, physical therapy for possible  traction,. --BETTER, DID SEE DR VELIZ currently not an issue November 2022    Dm 2 ,HGA1C = hemoglobin A1c  6.8,  urine microalbumin treatment options with GLP-1's, metformin, Jardiance discussed with patient we will start with metformin 500 mg twice a day May 19, 2025, will follow-up again in 3 months patient is can lose weight stop salt intake, continue drinking plenty of water , walking,    elevated bilirubin most likely [place]-- isolated, previously elevated at 1.3 in the past several years, normal, November 13, 2023--now up to 1.8, may 2024, will recheck, June 2024     htn --continue valsartan 320/25 mg  daily    elevated chol--continues Crestor 20 mg, and l co-Q10     psa per urology, ==6.2--BX POSITIVE CA,----UP TO 8.5 --BX --SEPT, 2020,------DR RIVERA==JAN 2021, ==========TOTAL PROSTATECTOMY-----still staight cath q 3-4 days,   Had cysto April 2023,  Getting artifical sphincter placement  July 2023,   ---psa undetectable may 2023-- undetectable again nov 2024     Colonoscopy screening, normal --Dr. Starks, April 2024        Follow Up   Return in about 3 months (around 8/19/2025).  Patient was given instructions and counseling regarding his condition or for health maintenance advice. Please see specific information pulled into the AVS if appropriate.           Answers submitted by the patient for this visit:  High Blood Pressure Questionnaire (Submitted on 5/19/2025)  Chief Complaint: Hypertension  Chronicity: chronic  Onset: more than 1 year ago  Progression since onset: unchanged  Condition status: controlled  anxiety: No  headaches: No  malaise/fatigue: No  orthopnea: No  peripheral edema: No  Agents associated with hypertension: decongestants  CAD risks: dyslipidemia, family history, obesity  Compliance problems: diet, exercise

## 2025-05-19 NOTE — ASSESSMENT & PLAN NOTE
Orders:    ORDER: Hemoglobin A1c; Future    Microalbumin / Creatinine Urine Ratio - Urine, Clean Catch; Future    Hepatitis C Antibody; Future    [place] aaa screen limited; Future    montelukast (Singulair) 10 MG tablet; Take 1 tablet by mouth Every Night.    levocetirizine (XYZAL) 5 MG tablet; Take 1 tablet by mouth Every Evening.    Comprehensive Metabolic Panel; Future    CBC & Differential; Future    proBNP; Future    Magnesium; Future    Hemoglobin A1c; Future    TSH+Free T4; Future    Microalbumin / Creatinine Urine Ratio - Urine, Clean Catch; Future    Vitamin B12 anemia; Future    Folate anemia; Future    Vitamin D,25-Hydroxy; Future    Adult Transthoracic Echo Complete W/ Cont if Necessary Per Protocol; Future    Ambulatory Referral to Cardiology

## 2025-05-19 NOTE — ASSESSMENT & PLAN NOTE
Diabetes is worsening.   Medication changes per orders.  Reminded to bring in blood sugar diary at next visit.  Recommended a Mediterranean style of eating  Diabetes will be reassessed in 3 months    Orders:    ORDER: Hemoglobin A1c; Future    Microalbumin / Creatinine Urine Ratio - Urine, Clean Catch; Future    Hepatitis C Antibody; Future    US aaa screen limited; Future    montelukast (Singulair) 10 MG tablet; Take 1 tablet by mouth Every Night.    levocetirizine (XYZAL) 5 MG tablet; Take 1 tablet by mouth Every Evening.    Comprehensive Metabolic Panel; Future    CBC & Differential; Future    proBNP; Future    Magnesium; Future    Hemoglobin A1c; Future    TSH+Free T4; Future    Microalbumin / Creatinine Urine Ratio - Urine, Clean Catch; Future    Vitamin B12 anemia; Future    Folate anemia; Future    Vitamin D,25-Hydroxy; Future    Adult Transthoracic Echo Complete W/ Cont if Necessary Per Protocol; Future    Ambulatory Referral to Cardiology

## 2025-05-19 NOTE — ASSESSMENT & PLAN NOTE
Orders:    ORDER: Hemoglobin A1c; Future    Microalbumin / Creatinine Urine Ratio - Urine, Clean Catch; Future    Hepatitis C Antibody; Future    US aaa screen limited; Future    montelukast (Singulair) 10 MG tablet; Take 1 tablet by mouth Every Night.    levocetirizine (XYZAL) 5 MG tablet; Take 1 tablet by mouth Every Evening.    Comprehensive Metabolic Panel; Future    CBC & Differential; Future    proBNP; Future    Magnesium; Future    Hemoglobin A1c; Future    TSH+Free T4; Future    Microalbumin / Creatinine Urine Ratio - Urine, Clean Catch; Future    Vitamin B12 anemia; Future    Folate anemia; Future    Vitamin D,25-Hydroxy; Future    Adult Transthoracic Echo Complete W/ Cont if Necessary Per Protocol; Future    Ambulatory Referral to Cardiology    metFORMIN (GLUCOPHAGE) 500 MG tablet; Take 1 tablet by mouth 2 (Two) Times a Day With Meals.

## 2025-05-23 DIAGNOSIS — J20.8 VIRAL BRONCHITIS: ICD-10-CM

## 2025-05-23 RX ORDER — ALBUTEROL SULFATE 90 UG/1
2 INHALANT RESPIRATORY (INHALATION) EVERY 4 HOURS PRN
Qty: 8 G | Refills: 0 | Status: CANCELLED | OUTPATIENT
Start: 2025-05-23

## 2025-05-23 RX ORDER — ALBUTEROL SULFATE 90 UG/1
2 INHALANT RESPIRATORY (INHALATION) EVERY 4 HOURS PRN
Qty: 18 G | Refills: 3 | Status: SHIPPED | OUTPATIENT
Start: 2025-05-23

## 2025-05-23 NOTE — TELEPHONE ENCOUNTER
"    Caller: Uriah Perez \"DIPTI\"    Relationship: Self    Best call back number: [phone]     Requested Prescriptions:   Requested Prescriptions     Pending Prescriptions Disp Refills    albuterol sulfate  (90 Base) MCG/ACT inhaler 8 g 0     Sig: Inhale 2 puffs Every 4 (Four) Hours As Needed for Wheezing or Shortness of Air.        Pharmacy where request should be sent: [facility] [id number]  [place] [id number] [facility] [address] [facility] [phone] [facility] [phone] FX     Last office visit with prescribing clinician: 5/19/2025   Last telemedicine visit with prescribing clinician: Visit date not found   Next office visit with prescribing clinician: 8/22/2025     Additional details provided by patient:     Does the patient have less than a 3 day supply:  [x] Yes  [] No        Terri Che, PCT   05/23/25 09:06 EDT         "

## 2025-05-23 NOTE — TELEPHONE ENCOUNTER
Last prescribed by  May 2022 for viral bronchitis, wheezing.  Ok to send this in?    Pended w/ 3 refills if agreeable.

## 2025-06-16 ENCOUNTER — HOSPITAL ENCOUNTER (OUTPATIENT)
Dept: CT IMAGING | Facility: HOSPITAL | Age: 75
Discharge: HOME | End: 2025-06-16
Payer: MEDICARE

## 2025-06-16 ENCOUNTER — HOSPITAL ENCOUNTER (OUTPATIENT)
Facility: HOSPITAL | Age: 75
Discharge: HOME | End: 2025-06-16
Payer: MEDICARE

## 2025-06-16 ENCOUNTER — OFFICE VISIT (OUTPATIENT)
Dept: CARDIOLOGY | Facility: CLINIC | Age: 75
End: 2025-06-16
Payer: MEDICARE

## 2025-06-16 VITALS
DIASTOLIC BLOOD PRESSURE: 67 MMHG | BODY MASS INDEX: 38.34 KG/M2 | WEIGHT: 253 LBS | HEART RATE: 62 BPM | HEIGHT: 68 IN | SYSTOLIC BLOOD PRESSURE: 139 MMHG | OXYGEN SATURATION: 95 %

## 2025-06-16 DIAGNOSIS — E78.2 HYPERLIPEMIA, MIXED: ICD-10-CM

## 2025-06-16 DIAGNOSIS — Z11.59 NEED FOR HEPATITIS C SCREENING TEST: ICD-10-CM

## 2025-06-16 DIAGNOSIS — R06.09 DYSPNEA ON EXERTION: Primary | ICD-10-CM

## 2025-06-16 DIAGNOSIS — E66.09 CLASS 1 OBESITY DUE TO EXCESS CALORIES WITHOUT SERIOUS COMORBIDITY WITH BODY MASS INDEX (BMI) OF 34.0 TO 34.9 IN ADULT: ICD-10-CM

## 2025-06-16 DIAGNOSIS — Z71.6 TOBACCO ABUSE COUNSELING: ICD-10-CM

## 2025-06-16 DIAGNOSIS — Z12.11 SCREENING FOR COLON CANCER: ICD-10-CM

## 2025-06-16 DIAGNOSIS — Z00.00 MEDICARE ANNUAL WELLNESS VISIT, SUBSEQUENT: ICD-10-CM

## 2025-06-16 DIAGNOSIS — I10 HYPERTENSION, ESSENTIAL: ICD-10-CM

## 2025-06-16 DIAGNOSIS — E78.2 MIXED HYPERLIPIDEMIA: ICD-10-CM

## 2025-06-16 DIAGNOSIS — E11.65 TYPE 2 DIABETES MELLITUS WITH HYPERGLYCEMIA, UNSPECIFIED WHETHER LONG TERM INSULIN USE: ICD-10-CM

## 2025-06-16 DIAGNOSIS — C61 PROSTATE CANCER: ICD-10-CM

## 2025-06-16 DIAGNOSIS — C85.96 LYMPHOMA OF LYMPH NODES IN PELVIS, UNSPECIFIED LYMPHOMA TYPE: ICD-10-CM

## 2025-06-16 DIAGNOSIS — E66.811 CLASS 1 OBESITY DUE TO EXCESS CALORIES WITHOUT SERIOUS COMORBIDITY WITH BODY MASS INDEX (BMI) OF 34.0 TO 34.9 IN ADULT: ICD-10-CM

## 2025-06-16 DIAGNOSIS — E55.9 VITAMIN D DEFICIENCY: ICD-10-CM

## 2025-06-16 DIAGNOSIS — Z87.891 PERSONAL HISTORY OF NICOTINE DEPENDENCE: ICD-10-CM

## 2025-06-16 PROCEDURE — 1159F MED LIST DOCD IN RCRD: CPT | Performed by: INTERNAL MEDICINE

## 2025-06-16 PROCEDURE — 1160F RVW MEDS BY RX/DR IN RCRD: CPT | Performed by: INTERNAL MEDICINE

## 2025-06-16 PROCEDURE — 99204 OFFICE O/P NEW MOD 45 MIN: CPT | Performed by: INTERNAL MEDICINE

## 2025-06-16 PROCEDURE — 3078F DIAST BP <80 MM HG: CPT | Performed by: INTERNAL MEDICINE

## 2025-06-16 PROCEDURE — 71271 CT THORAX LUNG CANCER SCR C-: CPT

## 2025-06-16 PROCEDURE — 25010000002 SULFUR HEXAFLUORIDE MICROSPH 60.7-25 MG RECONSTITUTED SUSPENSION: Performed by: INTERNAL MEDICINE

## 2025-06-16 PROCEDURE — 93000 ELECTROCARDIOGRAM COMPLETE: CPT | Performed by: INTERNAL MEDICINE

## 2025-06-16 PROCEDURE — 93306 TTE W/DOPPLER COMPLETE: CPT

## 2025-06-16 PROCEDURE — 3075F SYST BP GE 130 - 139MM HG: CPT | Performed by: INTERNAL MEDICINE

## 2025-06-16 RX ORDER — KETOCONAZOLE 20 MG/ML
SHAMPOO, SUSPENSION TOPICAL
COMMUNITY
Start: 2025-01-12

## 2025-06-16 RX ORDER — ROSUVASTATIN CALCIUM 20 MG/1
20 TABLET, COATED ORAL
Qty: 90 TABLET | Refills: 3 | Status: SHIPPED | OUTPATIENT
Start: 2025-06-16

## 2025-06-16 RX ADMIN — SULFUR HEXAFLUORIDE 5 ML: KIT at 10:15

## 2025-06-16 NOTE — PROGRESS NOTES
Chief Complaint  Fatigue (Pt is getting tired while doing things he was able to do a few months ago), Chest Pain (Light chest pain), and Shortness of Breath (Very 'huffy' as patient states)    Subjective            Uriah Perez presents to [facility] CARDIOLOGY  Fatigue  Symptoms include fatigue.    Pertinent negative symptoms include no chest pain.   Chest Pain   Associated symptoms include malaise/fatigue and shortness of breath.   Shortness of Breath  Associated symptoms: no chest pain        Father Cameron Perez is a 74-year-old male.  He has no significant cardiac history.  He is referred due to shortness of breath.  Over the past year he has noted that it has been more common to get short of breath with exertion.  He has at times tried to walk 10 miles daily as a routine.  With doing yard work and with intentional walking he has noticed dyspnea.  He has some minor chest tightness but nothing in the order of pain.  He denies palpitations.  He is compliant with his medical therapy.  He is recently diagnosed diabetic, he has hypertension and mixed hyperlipidemia.  He is overweight.  He reports being a former smoker.    [facility]  Past Medical History:   Diagnosis Date    Arthritis     GENERALIZED    At risk for sleep apnea     Disorder of bilirubin metabolism, unspecified     Disorder of white blood cells, unspecified     Eczema     SCALP AREA    Elevated cholesterol     Elevated PSA     History of 2019 novel coronavirus disease (COVID-19)     NOV 1, 2020 ([facility])    Hypercholesteremia     Hyperlipidemia     Hypertension     IFG (impaired fasting glucose)     Lymphoma     Pre-diabetes     Prostate cancer     Pure hypercholesterolemia     Urgency of urination     Vitamin D deficiency          SURGICALHX  Past Surgical History:   Procedure Laterality Date    BICEPS TENDON REPAIR Left 2018    COLONOSCOPY  2010    COLONOSCOPY N/A 4/11/2024    Procedure: COLONOSCOPY;  Surgeon: Tereza  Silviano Charlton MD;  Location: [facility] ENDOSCOPY;  Service: General;  Laterality: N/A;  NORMAL COLON    CYSTOSCOPY URETHROTOMY VISUAL INTERNAL N/A 2021    Procedure: CYSTOSCOPY WITH INTERNAL URETHROTOMY;  Surgeon: Alberto Duran Jr., MD;  Location: [facility] OR;  Service: Urology;  Laterality: N/A;    INGUINAL HERNIA REPAIR Left     OTHER SURGICAL HISTORY  2018    ligament repair    PROSTATECTOMY N/A 2021    Procedure: DAVINCI PROSTATECTOMY LAPAROSCOPIC WITH DILATERAL PELVIC LYMPH NODE DISSECTION;  Surgeon: Alberto Duran Jr., MD;  Location: [facility] OR;  Service: DaVinci;  Laterality: N/A;    TONSILLECTOMY      UMBILICAL HERNIA REPAIR      WRIST SURGERY Left         SOC  Social History     Socioeconomic History    Marital status: Single   Tobacco Use    Smoking status: Light Smoker     Current packs/day: 0.00     Types: Cigarettes     Last attempt to quit: 2000     Years since quittin.4    Smokeless tobacco: Former     Quit date:     Tobacco comments:     I'll smoke 2 or 3 cigarettes per month   Vaping Use    Vaping status: Never Used   Substance and Sexual Activity    Alcohol use: Yes     Alcohol/week: 4.0 standard drinks of alcohol     Types: 2 Cans of beer, 2 Shots of liquor per week     Comment: WEEKLY    Drug use: Not Currently     Types: Marijuana     Comment: in past    Sexual activity: Never         FAMHX  Family History   Problem Relation Age of Onset    Cancer Mother         CLL    Arthritis Mother     Diabetes Father     Cancer Father         AML    Arthritis Father     Diabetes Sister         Type 2    Diabetes Brother         Type 2    Malig Hyperthermia Neg Hx           ALLERGY  No Known Allergies     MEDSCURRENT    Current Outpatient Medications:     albuterol sulfate  (90 Base) MCG/ACT inhaler, Inhale 2 puffs Every 4 (Four) Hours As Needed for Wheezing or Shortness of Air. (Patient taking differently: Inhale 2 puffs Every 4 (Four) Hours As  Needed for Wheezing or Shortness of Air. PRN), Disp: 8 g, Rfl: 0    albuterol sulfate  (90 Base) MCG/ACT inhaler, Inhale 2 puffs Every 4 (Four) Hours As Needed for Wheezing or Shortness of Air., Disp: 18 g, Rfl: 3    aspirin 81 MG EC tablet, Take 1 tablet by mouth Daily., Disp: , Rfl:     COENZYME Q10 PO, Take  by mouth., Disp: , Rfl:     colchicine 0.6 MG tablet, Take 1 tablet by mouth 2 (Two) Times a Day., Disp: 60 tablet, Rfl: 5    ketoconazole (NIZORAL) 2 % shampoo, Apply  topically to the appropriate area as directed., Disp: , Rfl:     levocetirizine (XYZAL) 5 MG tablet, Take 1 tablet by mouth Every Evening., Disp: 30 tablet, Rfl: 5    metFORMIN (GLUCOPHAGE) 500 MG tablet, Take 1 tablet by mouth 2 (Two) Times a Day With Meals., Disp: 60 tablet, Rfl: 5    montelukast (Singulair) 10 MG tablet, Take 1 tablet by mouth Every Night., Disp: 30 tablet, Rfl: 5    multivitamin with minerals tablet tablet, Take 1 tablet by mouth Daily., Disp: , Rfl:     Omega-3 Fatty Acids (fish oil) 1000 MG capsule capsule, Take  by mouth Daily With Breakfast., Disp: , Rfl:     rosuvastatin (CRESTOR) 20 MG tablet, Take 1 tablet by mouth every night at bedtime., Disp: 90 tablet, Rfl: 3    valsartan-hydrochlorothiazide (DIOVAN-HCT) 320-12.5 MG per tablet, TAKE 1 TABLET BY MOUTH DAILY, Disp: 90 tablet, Rfl: 3  No current facility-administered medications for this visit.    Facility-Administered Medications Ordered in Other Visits:     Sulfur Hexafluoride Microsph (LUMASON) 60.7-25 MG IV reconstituted suspension reconstituted suspension 5 mL, 5 mL, Intravenous, Once in imaging, Lorenzo Brand MD      Review of Systems   Constitutional: Positive for fatigue and malaise/fatigue.   HENT: Negative.     Eyes: Negative.    Cardiovascular:  Positive for dyspnea on exertion. Negative for chest pain.   Respiratory:  Positive for shortness of breath.    Endocrine: Negative.    Hematologic/Lymphatic: Negative.    Skin: Negative.   "  Musculoskeletal: Negative.    Gastrointestinal: Negative.    Genitourinary: Negative.    Neurological: Negative.    Psychiatric/Behavioral: Negative.          Objective     /67   Pulse 62   Ht 172.7 cm (67.99\")   Wt 115 kg (253 lb)   SpO2 95%   BMI 38.48 kg/m²       General Appearance:   well developed  well nourished  HENT:   oropharynx moist  lips not cyanotic  Neck:  thyroid not enlarged  supple  Respiratory:  no respiratory distress  normal breath sounds  no rales  Cardiovascular:  no jugular venous distention  regular rhythm  apical impulse normal  S1 normal, S2 normal  no S3, no S4   no murmur  no rub, no thrill  carotid pulses normal; no bruit  pedal pulses normal  lower extremity edema: none    Musculoskeletal:  no clubbing of fingers.   normocephalic, head atraumatic  Skin:   warm, dry  Psychiatric:  judgement and insight appropriate  normal mood and affect      Result Review :     The following data was reviewed by: Uriah Nieves MD on 06/16/2025:    CMP          5/15/2025    08:52   CMP   Glucose 141    BUN 17    Creatinine 0.97    EGFR 82.9    Sodium 139    Potassium 4.7    Chloride 103    Calcium 9.7    Total Protein 6.5    Albumin 4.2    Globulin 2.3    Total Bilirubin 1.2    Alkaline Phosphatase 70    AST (SGOT) 29    ALT (SGPT) 36    Albumin/Globulin Ratio 1.8    BUN/Creatinine Ratio 17.5    Anion Gap 9.1      CBC          9/3/2024    14:04 11/14/2024    08:14 5/15/2025    08:52   CBC   WBC 12.10     13.17  15.84    RBC 4.74     4.94  4.90    Hemoglobin 14.6     14.6  15.1    Hematocrit 42.8     44.8  44.8    MCV 90.3     90.7  91.4    MCH 30.8     29.6  30.8    MCHC 34.1     32.6  33.7    RDW 12.6     12.6  12.8    Platelets 232     241  262       Details          This result is from an external source.             Lipid Panel          11/14/2024    08:14 5/15/2025    08:52   Lipid Panel   Total Cholesterol 186  213    Triglycerides 183  234    HDL Cholesterol 55  52  "   VLDL Cholesterol 31  41    LDL Cholesterol  100  120    LDL/HDL Ratio 1.72  2.20          Data reviewed : Primary care records reviewed, laboratory studies reviewed       ECG 12 Lead    Date/Time: 6/16/2025 10:42 AM  Performed by: GERMAIN Nieves MD    Authorized by: GERMAIN Nieves MD  Comparison: compared with previous ECG   Similar to previous ECG  Comparison to previous ECG: Compared to previous EKG no significant change from 2022  Rhythm: sinus rhythm  Conduction: incomplete left bundle branch block  ST Segments: ST segments normal  T Waves: T waves normal  QRS axis: normal  Other: no other findings    Clinical impression: non-specific ECG               Assessment and Plan        ASSESSMENT:  Encounter Diagnoses   Name Primary?    Dyspnea on exertion Yes    Hypertension, essential     Hyperlipemia, mixed          PLAN:    1.  Dyspnea on exertion-somewhat progressive over the past year.  His echocardiogram was reviewed by me this morning and it showed normal biventricular function with no significant valvular abnormalities.  His baseline EKG is unremarkable.  Low-dose CT scan is pending scheduling.  A stress imaging test will be scheduled to evaluate for ischemic heart disease.  Will discuss diagnostic results when available.  If he is found to have even early coronary disease on low-dose CT aspirin prophylaxis would be recommended which currently he is not taking  2.  Essential hypertension-controlled, continue current medical regimen  3.  Mixed hyperlipidemia-not controlled for diabetic, will increase rosuvastatin to 20 mg daily.  Levels can be followed through primary care    The patient is agreeable with this plan, follow-up to be determined          Patient was given instructions and counseling regarding his condition or for health maintenance advice. Please see specific information pulled into the AVS if appropriate.             GERMAIN Nieves MD  6/16/2025    10:40 EDT

## 2025-06-17 LAB
AORTIC DIMENSIONLESS INDEX: 0.72 (DI)
AV MEAN PRESS GRAD SYS DOP V1V2: 4.4 MMHG
AV VMAX SYS DOP: 149.9 CM/SEC
BH CV ECHO MEAS - ACS: 2.21 CM
BH CV ECHO MEAS - AO MAX PG: 9 MMHG
BH CV ECHO MEAS - AO ROOT DIAM: 3.4 CM
BH CV ECHO MEAS - AO V2 VTI: 33.3 CM
BH CV ECHO MEAS - AVA(I,D): 2.49 CM2
BH CV ECHO MEAS - EDV(CUBED): 102.6 ML
BH CV ECHO MEAS - EDV(MOD-SP2): 145 ML
BH CV ECHO MEAS - EDV(MOD-SP4): 109 ML
BH CV ECHO MEAS - EF(MOD-SP2): 63.7 %
BH CV ECHO MEAS - EF(MOD-SP4): 54.8 %
BH CV ECHO MEAS - ESV(CUBED): 22 ML
BH CV ECHO MEAS - ESV(MOD-SP2): 52.7 ML
BH CV ECHO MEAS - ESV(MOD-SP4): 49.3 ML
BH CV ECHO MEAS - FS: 40.2 %
BH CV ECHO MEAS - IVS/LVPW: 0.8 CM
BH CV ECHO MEAS - IVSD: 0.89 CM
BH CV ECHO MEAS - LA DIMENSION: 3.1 CM
BH CV ECHO MEAS - LAT PEAK E' VEL: 10.7 CM/SEC
BH CV ECHO MEAS - LV DIASTOLIC VOL/BSA (35-75): 48.5 CM2
BH CV ECHO MEAS - LV MASS(C)D: 164.4 GRAMS
BH CV ECHO MEAS - LV MAX PG: 4.9 MMHG
BH CV ECHO MEAS - LV MEAN PG: 2.21 MMHG
BH CV ECHO MEAS - LV SYSTOLIC VOL/BSA (12-30): 21.9 CM2
BH CV ECHO MEAS - LV V1 MAX: 110.5 CM/SEC
BH CV ECHO MEAS - LV V1 VTI: 24 CM
BH CV ECHO MEAS - LVIDD: 4.7 CM
BH CV ECHO MEAS - LVIDS: 2.8 CM
BH CV ECHO MEAS - LVOT AREA: 3.4 CM2
BH CV ECHO MEAS - LVOT DIAM: 2.1 CM
BH CV ECHO MEAS - LVPWD: 1.12 CM
BH CV ECHO MEAS - MED PEAK E' VEL: 8.7 CM/SEC
BH CV ECHO MEAS - MV A MAX VEL: 93.4 CM/SEC
BH CV ECHO MEAS - MV DEC SLOPE: 281.8 CM/SEC2
BH CV ECHO MEAS - MV DEC TIME: 0.25 SEC
BH CV ECHO MEAS - MV E MAX VEL: 70.7 CM/SEC
BH CV ECHO MEAS - MV E/A: 0.76
BH CV ECHO MEAS - PA V2 MAX: 109.9 CM/SEC
BH CV ECHO MEAS - SV(LVOT): 82.9 ML
BH CV ECHO MEAS - SV(MOD-SP2): 92.3 ML
BH CV ECHO MEAS - SV(MOD-SP4): 59.7 ML
BH CV ECHO MEAS - SVI(LVOT): 36.9 ML/M2
BH CV ECHO MEAS - SVI(MOD-SP2): 41.1 ML/M2
BH CV ECHO MEAS - SVI(MOD-SP4): 26.6 ML/M2
BH CV ECHO MEAS - TAPSE (>1.6): 3 CM
BH CV ECHO MEASUREMENTS AVERAGE E/E' RATIO: 7.29
BH CV XLRA - TDI S': 16.8 CM/SEC
IVRT: 63 MS
LEFT ATRIUM VOLUME INDEX: 25.1 ML/M2
LV EF BIPLANE MOD: 60.2 %

## 2025-07-08 ENCOUNTER — HOSPITAL ENCOUNTER (OUTPATIENT)
Facility: HOSPITAL | Age: 75
Discharge: HOME | End: 2025-07-08
Admitting: INTERNAL MEDICINE
Payer: MEDICARE

## 2025-07-08 DIAGNOSIS — R06.09 DYSPNEA ON EXERTION: ICD-10-CM

## 2025-07-08 PROCEDURE — 93017 CV STRESS TEST TRACING ONLY: CPT

## 2025-07-08 PROCEDURE — 34310000005 TECHNETIUM TETROFOSMIN KIT: Performed by: INTERNAL MEDICINE

## 2025-07-08 PROCEDURE — A9502 TC99M TETROFOSMIN: HCPCS | Performed by: INTERNAL MEDICINE

## 2025-07-08 PROCEDURE — 78452 HT MUSCLE IMAGE SPECT MULT: CPT

## 2025-07-08 PROCEDURE — 25010000002 REGADENOSON 0.4 MG/5ML SOLUTION: Performed by: INTERNAL MEDICINE

## 2025-07-08 RX ORDER — REGADENOSON 0.08 MG/ML
0.4 INJECTION, SOLUTION INTRAVENOUS
Status: DISCONTINUED | OUTPATIENT
Start: 2025-07-08 | End: 2025-07-09 | Stop reason: HOSPADM

## 2025-07-08 RX ADMIN — TETROFOSMIN 1 DOSE: 1.38 INJECTION, POWDER, LYOPHILIZED, FOR SOLUTION INTRAVENOUS at 08:40

## 2025-07-08 RX ADMIN — TETROFOSMIN 1 DOSE: 1.38 INJECTION, POWDER, LYOPHILIZED, FOR SOLUTION INTRAVENOUS at 07:25

## 2025-07-09 ENCOUNTER — RESULTS FOLLOW-UP (OUTPATIENT)
Dept: CARDIOLOGY | Facility: CLINIC | Age: 75
End: 2025-07-09
Payer: COMMERCIAL

## 2025-07-09 LAB
BH CV IMMEDIATE POST RECOVERY TECH DATA SYMPTOMS: NORMAL
BH CV IMMEDIATE POST TECH DATA BLOOD PRESSURE: NORMAL MMHG
BH CV IMMEDIATE POST TECH DATA HEART RATE: 114 BPM
BH CV IMMEDIATE POST TECH DATA OXYGEN SATS: 90 %
BH CV NINE MINUTE RECOVERY TECH DATA BLOOD PRESSURE: NORMAL MMHG
BH CV NINE MINUTE RECOVERY TECH DATA HEART RATE: 66 BPM
BH CV NINE MINUTE RECOVERY TECH DATA OXYGEN SATURATION: 95 %
BH CV NINE MINUTE RECOVERY TECH DATA SYMPTOMS: NORMAL
BH CV REST NUCLEAR ISOTOPE DOSE: 9.9 MCI
BH CV SIX MINUTE RECOVERY TECH DATA BLOOD PRESSURE: NORMAL
BH CV SIX MINUTE RECOVERY TECH DATA HEART RATE: 68 BPM
BH CV SIX MINUTE RECOVERY TECH DATA OXYGEN SATURATION: 95 %
BH CV SIX MINUTE RECOVERY TECH DATA SYMPTOMS: NORMAL
BH CV STRESS BP STAGE 1: NORMAL
BH CV STRESS BP STAGE 2: NORMAL
BH CV STRESS DURATION MIN STAGE 1: 3
BH CV STRESS DURATION MIN STAGE 2: 2
BH CV STRESS DURATION SEC STAGE 1: 0
BH CV STRESS DURATION SEC STAGE 2: 54
BH CV STRESS GRADE STAGE 1: 10
BH CV STRESS GRADE STAGE 2: 12
BH CV STRESS HR STAGE 1: 102
BH CV STRESS HR STAGE 2: 118
BH CV STRESS METS STAGE 1: 5
BH CV STRESS METS STAGE 2: 7.5
BH CV STRESS NUCLEAR ISOTOPE DOSE: 38.2 MCI
BH CV STRESS O2 STAGE 1: 90
BH CV STRESS O2 STAGE 2: 86
BH CV STRESS PROTOCOL 1: NORMAL
BH CV STRESS RECOVERY BP: NORMAL MMHG
BH CV STRESS RECOVERY HR: 66 BPM
BH CV STRESS RECOVERY O2: 93 %
BH CV STRESS SPEED STAGE 1: 1.7
BH CV STRESS SPEED STAGE 2: 2.5
BH CV STRESS STAGE 1: 1
BH CV STRESS STAGE 2: 2
BH CV THREE MINUTE POST TECH DATA BLOOD PRESSURE: NORMAL MMHG
BH CV THREE MINUTE POST TECH DATA HEART RATE: 71 BPM
BH CV THREE MINUTE POST TECH DATA OXYGEN SATURATION: 96 %
BH CV TWELVE MINUTE RECOVERY TECH DATA BLOOD PRESSURE: NORMAL MMHG
BH CV TWELVE MINUTE RECOVERY TECH DATA HEART RATE: 66 BPM
BH CV TWELVE MINUTE RECOVERY TECH DATA OXYGEN SATURATION: 93 %
BH CV TWELVE MINUTE RECOVERY TECH DATA SYMPTOMS: NORMAL
MAXIMAL PREDICTED HEART RATE: 148 BPM
PERCENT MAX PREDICTED HR: 79.73 %
SPECT HRT GATED+EF W RNC IV: 62 %
STRESS BASELINE BP: NORMAL MMHG
STRESS BASELINE HR: 58 BPM
STRESS O2 SAT REST: 94 %
STRESS PERCENT HR: 94 %
STRESS POST ESTIMATED WORKLOAD: 7.1 METS
STRESS POST EXERCISE DUR MIN: 5 MIN
STRESS POST EXERCISE DUR SEC: 54 SEC
STRESS POST O2 SAT PEAK: 90 %
STRESS POST PEAK BP: NORMAL MMHG
STRESS POST PEAK HR: 118 BPM
STRESS TARGET HR: 126 BPM

## 2025-08-13 ENCOUNTER — LAB (OUTPATIENT)
Facility: HOSPITAL | Age: 75
End: 2025-08-13
Payer: MEDICARE

## 2025-08-13 DIAGNOSIS — E66.811 CLASS 1 OBESITY DUE TO EXCESS CALORIES WITHOUT SERIOUS COMORBIDITY WITH BODY MASS INDEX (BMI) OF 34.0 TO 34.9 IN ADULT: ICD-10-CM

## 2025-08-13 DIAGNOSIS — I11.0 HYPERTENSIVE HEART DISEASE WITH HEART FAILURE: ICD-10-CM

## 2025-08-13 DIAGNOSIS — I10 HYPERTENSION, ESSENTIAL: ICD-10-CM

## 2025-08-13 DIAGNOSIS — Z11.59 NEED FOR HEPATITIS C SCREENING TEST: ICD-10-CM

## 2025-08-13 DIAGNOSIS — Z12.11 SCREENING FOR COLON CANCER: ICD-10-CM

## 2025-08-13 DIAGNOSIS — E55.9 VITAMIN D DEFICIENCY: ICD-10-CM

## 2025-08-13 DIAGNOSIS — E11.65 TYPE 2 DIABETES MELLITUS WITH HYPERGLYCEMIA, UNSPECIFIED WHETHER LONG TERM INSULIN USE: ICD-10-CM

## 2025-08-13 DIAGNOSIS — E66.09 CLASS 1 OBESITY DUE TO EXCESS CALORIES WITHOUT SERIOUS COMORBIDITY WITH BODY MASS INDEX (BMI) OF 34.0 TO 34.9 IN ADULT: ICD-10-CM

## 2025-08-13 DIAGNOSIS — C85.96 LYMPHOMA OF LYMPH NODES IN PELVIS, UNSPECIFIED LYMPHOMA TYPE: ICD-10-CM

## 2025-08-13 DIAGNOSIS — E78.2 MIXED HYPERLIPIDEMIA: ICD-10-CM

## 2025-08-13 DIAGNOSIS — Z00.00 MEDICARE ANNUAL WELLNESS VISIT, SUBSEQUENT: ICD-10-CM

## 2025-08-13 DIAGNOSIS — C61 PROSTATE CANCER: ICD-10-CM

## 2025-08-13 LAB
25(OH)D3 SERPL-MCNC: 42.7 NG/ML (ref 30–100)
ALBUMIN SERPL-MCNC: 4.4 G/DL (ref 3.5–5.2)
ALBUMIN UR-MCNC: <1.2 MG/DL
ALBUMIN/GLOB SERPL: 1.9 G/DL
ALP SERPL-CCNC: 57 U/L (ref 39–117)
ALT SERPL W P-5'-P-CCNC: 21 U/L (ref 1–41)
ANION GAP SERPL CALCULATED.3IONS-SCNC: 12.3 MMOL/L (ref 5–15)
AST SERPL-CCNC: 19 U/L (ref 1–40)
BASOPHILS # BLD AUTO: 0.08 10*3/MM3 (ref 0–0.2)
BASOPHILS NFR BLD AUTO: 0.7 % (ref 0–1.5)
BILIRUB SERPL-MCNC: 1.5 MG/DL (ref 0–1.2)
BUN SERPL-MCNC: 20 MG/DL (ref 8–23)
BUN/CREAT SERPL: 22 (ref 7–25)
CALCIUM SPEC-SCNC: 9.8 MG/DL (ref 8.6–10.5)
CHLORIDE SERPL-SCNC: 104 MMOL/L (ref 98–107)
CO2 SERPL-SCNC: 23.7 MMOL/L (ref 22–29)
CREAT SERPL-MCNC: 0.91 MG/DL (ref 0.76–1.27)
CREAT UR-MCNC: 107.6 MG/DL
DEPRECATED RDW RBC AUTO: 42.9 FL (ref 37–54)
EGFRCR SERPLBLD CKD-EPI 2021: 89.5 ML/MIN/1.73
EOSINOPHIL # BLD AUTO: 0.29 10*3/MM3 (ref 0–0.4)
EOSINOPHIL NFR BLD AUTO: 2.5 % (ref 0.3–6.2)
ERYTHROCYTE [DISTWIDTH] IN BLOOD BY AUTOMATED COUNT: 12.8 % (ref 12.3–15.4)
FOLATE SERPL-MCNC: 16.7 NG/ML (ref 4.78–24.2)
GLOBULIN UR ELPH-MCNC: 2.3 GM/DL
GLUCOSE SERPL-MCNC: 130 MG/DL (ref 65–99)
HBA1C MFR BLD: 6.3 % (ref 4.8–5.6)
HCT VFR BLD AUTO: 43.3 % (ref 37.5–51)
HCV AB SER QL: NORMAL
HGB BLD-MCNC: 14.4 G/DL (ref 13–17.7)
IMM GRANULOCYTES # BLD AUTO: 0.03 10*3/MM3 (ref 0–0.05)
IMM GRANULOCYTES NFR BLD AUTO: 0.3 % (ref 0–0.5)
LYMPHOCYTES # BLD AUTO: 4.79 10*3/MM3 (ref 0.7–3.1)
LYMPHOCYTES NFR BLD AUTO: 42.1 % (ref 19.6–45.3)
MAGNESIUM SERPL-MCNC: 2.1 MG/DL (ref 1.6–2.4)
MCH RBC QN AUTO: 30.6 PG (ref 26.6–33)
MCHC RBC AUTO-ENTMCNC: 33.3 G/DL (ref 31.5–35.7)
MCV RBC AUTO: 91.9 FL (ref 79–97)
MICROALBUMIN/CREAT UR: NORMAL MG/G{CREAT}
MONOCYTES # BLD AUTO: 0.77 10*3/MM3 (ref 0.1–0.9)
MONOCYTES NFR BLD AUTO: 6.8 % (ref 5–12)
NEUTROPHILS NFR BLD AUTO: 47.6 % (ref 42.7–76)
NEUTROPHILS NFR BLD AUTO: 5.43 10*3/MM3 (ref 1.7–7)
NRBC BLD AUTO-RTO: 0 /100 WBC (ref 0–0.2)
NT-PROBNP SERPL-MCNC: 66.9 PG/ML (ref 0–900)
PLATELET # BLD AUTO: 236 10*3/MM3 (ref 140–450)
PMV BLD AUTO: 11.9 FL (ref 6–12)
POTASSIUM SERPL-SCNC: 4.4 MMOL/L (ref 3.5–5.2)
PROT SERPL-MCNC: 6.7 G/DL (ref 6–8.5)
RBC # BLD AUTO: 4.71 10*6/MM3 (ref 4.14–5.8)
SODIUM SERPL-SCNC: 140 MMOL/L (ref 136–145)
T4 FREE SERPL-MCNC: 1.14 NG/DL (ref 0.92–1.68)
TSH SERPL DL<=0.05 MIU/L-ACNC: 3.19 UIU/ML (ref 0.27–4.2)
VIT B12 BLD-MCNC: 642 PG/ML (ref 211–946)
WBC NRBC COR # BLD AUTO: 11.39 10*3/MM3 (ref 3.4–10.8)

## 2025-08-13 PROCEDURE — 80053 COMPREHEN METABOLIC PANEL: CPT

## 2025-08-13 PROCEDURE — 83735 ASSAY OF MAGNESIUM: CPT

## 2025-08-13 PROCEDURE — 82570 ASSAY OF URINE CREATININE: CPT

## 2025-08-13 PROCEDURE — 83036 HEMOGLOBIN GLYCOSYLATED A1C: CPT

## 2025-08-13 PROCEDURE — 83880 ASSAY OF NATRIURETIC PEPTIDE: CPT

## 2025-08-13 PROCEDURE — 84439 ASSAY OF FREE THYROXINE: CPT

## 2025-08-13 PROCEDURE — 86803 HEPATITIS C AB TEST: CPT

## 2025-08-13 PROCEDURE — 82306 VITAMIN D 25 HYDROXY: CPT

## 2025-08-13 PROCEDURE — 85025 COMPLETE CBC W/AUTO DIFF WBC: CPT

## 2025-08-13 PROCEDURE — 36415 COLL VENOUS BLD VENIPUNCTURE: CPT

## 2025-08-13 PROCEDURE — 84443 ASSAY THYROID STIM HORMONE: CPT

## 2025-08-13 PROCEDURE — 82746 ASSAY OF FOLIC ACID SERUM: CPT

## 2025-08-13 PROCEDURE — 82607 VITAMIN B-12: CPT

## 2025-08-13 PROCEDURE — 82043 UR ALBUMIN QUANTITATIVE: CPT

## 2025-08-14 ENCOUNTER — OFFICE VISIT (OUTPATIENT)
Dept: INTERNAL MEDICINE | Age: 75
End: 2025-08-14
Payer: MEDICARE

## 2025-08-14 VITALS
TEMPERATURE: 97.7 F | SYSTOLIC BLOOD PRESSURE: 138 MMHG | WEIGHT: 250.6 LBS | BODY MASS INDEX: 37.98 KG/M2 | HEIGHT: 68 IN | HEART RATE: 67 BPM | OXYGEN SATURATION: 93 % | DIASTOLIC BLOOD PRESSURE: 83 MMHG

## 2025-08-14 DIAGNOSIS — E78.2 MIXED HYPERLIPIDEMIA: ICD-10-CM

## 2025-08-14 DIAGNOSIS — C61 PROSTATE CANCER: ICD-10-CM

## 2025-08-14 DIAGNOSIS — R06.02 SHORTNESS OF BREATH: Primary | ICD-10-CM

## 2025-08-14 DIAGNOSIS — I10 HYPERTENSION, ESSENTIAL: ICD-10-CM

## 2025-08-14 DIAGNOSIS — E11.65 TYPE 2 DIABETES MELLITUS WITH HYPERGLYCEMIA, WITHOUT LONG-TERM CURRENT USE OF INSULIN: ICD-10-CM

## 2025-08-14 DIAGNOSIS — E66.09 CLASS 1 OBESITY DUE TO EXCESS CALORIES WITHOUT SERIOUS COMORBIDITY WITH BODY MASS INDEX (BMI) OF 34.0 TO 34.9 IN ADULT: ICD-10-CM

## 2025-08-14 DIAGNOSIS — E66.811 CLASS 1 OBESITY DUE TO EXCESS CALORIES WITHOUT SERIOUS COMORBIDITY WITH BODY MASS INDEX (BMI) OF 34.0 TO 34.9 IN ADULT: ICD-10-CM

## 2025-08-14 DIAGNOSIS — C85.96 LYMPHOMA OF LYMPH NODES IN PELVIS, UNSPECIFIED LYMPHOMA TYPE: ICD-10-CM

## 2025-08-14 DIAGNOSIS — Z71.6 TOBACCO ABUSE COUNSELING: ICD-10-CM

## 2025-08-19 ENCOUNTER — HOSPITAL ENCOUNTER (OUTPATIENT)
Dept: ULTRASOUND IMAGING | Facility: HOSPITAL | Age: 75
Discharge: HOME | End: 2025-08-19
Admitting: INTERNAL MEDICINE
Payer: MEDICARE

## 2025-08-19 DIAGNOSIS — Z00.00 MEDICARE ANNUAL WELLNESS VISIT, SUBSEQUENT: ICD-10-CM

## 2025-08-19 DIAGNOSIS — E11.65 TYPE 2 DIABETES MELLITUS WITH HYPERGLYCEMIA, UNSPECIFIED WHETHER LONG TERM INSULIN USE: ICD-10-CM

## 2025-08-19 DIAGNOSIS — E66.09 CLASS 1 OBESITY DUE TO EXCESS CALORIES WITHOUT SERIOUS COMORBIDITY WITH BODY MASS INDEX (BMI) OF 34.0 TO 34.9 IN ADULT: ICD-10-CM

## 2025-08-19 DIAGNOSIS — C85.96 LYMPHOMA OF LYMPH NODES IN PELVIS, UNSPECIFIED LYMPHOMA TYPE: ICD-10-CM

## 2025-08-19 DIAGNOSIS — E55.9 VITAMIN D DEFICIENCY: ICD-10-CM

## 2025-08-19 DIAGNOSIS — E66.811 CLASS 1 OBESITY DUE TO EXCESS CALORIES WITHOUT SERIOUS COMORBIDITY WITH BODY MASS INDEX (BMI) OF 34.0 TO 34.9 IN ADULT: ICD-10-CM

## 2025-08-19 DIAGNOSIS — I10 HYPERTENSION, ESSENTIAL: ICD-10-CM

## 2025-08-19 DIAGNOSIS — E78.2 MIXED HYPERLIPIDEMIA: ICD-10-CM

## 2025-08-19 DIAGNOSIS — C61 PROSTATE CANCER: ICD-10-CM

## 2025-08-19 DIAGNOSIS — Z11.59 NEED FOR HEPATITIS C SCREENING TEST: ICD-10-CM

## 2025-08-19 DIAGNOSIS — Z12.11 SCREENING FOR COLON CANCER: ICD-10-CM

## 2025-08-19 PROCEDURE — 76706 US ABDL AORTA SCREEN AAA: CPT

## (undated) DEVICE — SUT MNCRYL PLS ANTIB UD 4/0 PS2 18IN

## (undated) DEVICE — SOL ANTISTICK CAUTRY ELECTROLUBE LF

## (undated) DEVICE — ENDOPATH XCEL BLADELESS TROCARS WITH STABILITY SLEEVES: Brand: ENDOPATH XCEL

## (undated) DEVICE — CANNULA SEAL

## (undated) DEVICE — LOU CYSTO: Brand: MEDLINE INDUSTRIES, INC.

## (undated) DEVICE — BLD CUT STR

## (undated) DEVICE — GLV SURG BIOGEL LTX PF 7

## (undated) DEVICE — JELLY,LUBE,STERILE,FLIP TOP,TUBE,4-OZ: Brand: MEDLINE

## (undated) DEVICE — CONN JET HYDRA H20 AUXILIARY DISP

## (undated) DEVICE — TIP COVER ACCESSORY

## (undated) DEVICE — DRAPE,REIN 53X77,STERILE: Brand: MEDLINE

## (undated) DEVICE — Device: Brand: DEFENDO AIR/WATER/SUCTION AND BIOPSY VALVE

## (undated) DEVICE — JACKSON-PRATT 100CC BULB RESERVOIR: Brand: CARDINAL HEALTH

## (undated) DEVICE — ARM DRAPE

## (undated) DEVICE — 30977 SEE SHARP - ENHANCED INTRAOPERATIVE LAPAROSCOPE CLEANING & DEFOGGING: Brand: 30977 SEE SHARP - ENHANCED INTRAOPERATIVE LAPAROSCOPE CLEANING & DEFOGGING

## (undated) DEVICE — SOL IRRG H2O PL/BG 1000ML STRL

## (undated) DEVICE — UNDYED BRAIDED (POLYGLACTIN 910), SYNTHETIC ABSORBABLE SUTURE: Brand: COATED VICRYL

## (undated) DEVICE — TUBING, SUCTION, 1/4" X 10', STRAIGHT: Brand: MEDLINE

## (undated) DEVICE — SOL IRR H2O BTL 1000ML STRL

## (undated) DEVICE — SOLIDIFIER LIQLOC PLS 1500CC BT

## (undated) DEVICE — SUT SILK 2/0 FS BLK 18IN 685G

## (undated) DEVICE — COLUMN DRAPE

## (undated) DEVICE — SYRINGE,TOOMEY,IRRIGATION,70CC,STERILE: Brand: MEDLINE

## (undated) DEVICE — LINER SURG CANSTR SXN S/RIGD 1500CC

## (undated) DEVICE — SKIN PREP TRAY W/CHG: Brand: MEDLINE INDUSTRIES, INC.

## (undated) DEVICE — CATH FOL COUNCL 2WY 18F 5CC

## (undated) DEVICE — STERILE POLYISOPRENE POWDER-FREE SURGICAL GLOVES: Brand: PROTEXIS

## (undated) DEVICE — SOL NACL 0.9PCT 1000ML

## (undated) DEVICE — CATH FOL SIMPLYLATEX SILELAST 18F 17IN

## (undated) DEVICE — SUT VIC 0 CT1 36IN J946H

## (undated) DEVICE — ENDOPOUCH RETRIEVER SPECIMEN RETRIEVAL BAGS: Brand: ENDOPOUCH RETRIEVER

## (undated) DEVICE — BLADELESS OBTURATOR: Brand: WECK VISTA

## (undated) DEVICE — LOU GENERAL ROBOT: Brand: MEDLINE INDUSTRIES, INC.

## (undated) DEVICE — Device

## (undated) DEVICE — VESSEL SEALER EXTEND: Brand: ENDOWRIST

## (undated) DEVICE — DRN WND JP RND W TROC SIL 15F 3/16IN

## (undated) DEVICE — VIOLET POLYDIOXANONE POLYMER, SYNTHETIC ABSORBABLE SUTURE CLIPS: Brand: LAPRA-TY

## (undated) DEVICE — STERILE POLYISOPRENE POWDER-FREE SURGICAL GLOVES WITH EMOLLIENT COATING: Brand: PROTEXIS

## (undated) DEVICE — TIDISHIELD UROLOGY DRAIN BAGS FROSTY VINYL STERILE FITS SIEMENS UROSKOP ACCESS 20 PER CASE: Brand: TIDISHIELD

## (undated) DEVICE — SUT VIC 2/0 SH 27IN